# Patient Record
Sex: FEMALE | Race: WHITE | NOT HISPANIC OR LATINO | Employment: FULL TIME | URBAN - METROPOLITAN AREA
[De-identification: names, ages, dates, MRNs, and addresses within clinical notes are randomized per-mention and may not be internally consistent; named-entity substitution may affect disease eponyms.]

---

## 2017-05-03 PROBLEM — L97.909 STASIS ULCER (HCC): Status: ACTIVE | Noted: 2017-05-03

## 2017-05-03 PROBLEM — I83.009 STASIS ULCER (HCC): Status: ACTIVE | Noted: 2017-05-03

## 2017-05-03 PROBLEM — L03.90 CELLULITIS: Status: ACTIVE | Noted: 2017-05-03

## 2018-06-27 PROBLEM — I10 ESSENTIAL HYPERTENSION: Status: ACTIVE | Noted: 2018-06-27

## 2018-06-27 PROBLEM — I83.009 STASIS ULCER (HCC): Status: RESOLVED | Noted: 2017-05-03 | Resolved: 2018-06-27

## 2018-06-27 PROBLEM — L97.909 STASIS ULCER (HCC): Status: RESOLVED | Noted: 2017-05-03 | Resolved: 2018-06-27

## 2018-06-27 PROBLEM — L03.90 CELLULITIS: Status: RESOLVED | Noted: 2017-05-03 | Resolved: 2018-06-27

## 2018-08-07 ENCOUNTER — HOSPITAL ENCOUNTER (INPATIENT)
Facility: MEDICAL CENTER | Age: 58
LOS: 8 days | DRG: 853 | End: 2018-08-16
Attending: EMERGENCY MEDICINE | Admitting: INTERNAL MEDICINE

## 2018-08-07 ENCOUNTER — APPOINTMENT (OUTPATIENT)
Dept: RADIOLOGY | Facility: MEDICAL CENTER | Age: 58
DRG: 853 | End: 2018-08-07
Attending: EMERGENCY MEDICINE

## 2018-08-07 DIAGNOSIS — R41.82 ALTERED MENTAL STATUS, UNSPECIFIED ALTERED MENTAL STATUS TYPE: ICD-10-CM

## 2018-08-07 DIAGNOSIS — N39.0 ACUTE UTI: ICD-10-CM

## 2018-08-07 DIAGNOSIS — T17.908A ASPIRATION INTO AIRWAY, INITIAL ENCOUNTER: ICD-10-CM

## 2018-08-07 LAB
ACTION RANGE TRIGGERED IACRT: YES
ALBUMIN SERPL BCP-MCNC: 2.8 G/DL (ref 3.2–4.9)
ALBUMIN/GLOB SERPL: 1.2 G/DL
ALP SERPL-CCNC: 68 U/L (ref 30–99)
ALT SERPL-CCNC: 33 U/L (ref 2–50)
ANION GAP SERPL CALC-SCNC: 8 MMOL/L (ref 0–11.9)
APAP SERPL-MCNC: <10 UG/ML (ref 10–30)
APPEARANCE UR: ABNORMAL
AST SERPL-CCNC: 61 U/L (ref 12–45)
BACTERIA #/AREA URNS HPF: NEGATIVE /HPF
BASE EXCESS BLDA CALC-SCNC: -6 MMOL/L (ref -4–3)
BASOPHILS # BLD AUTO: 0.3 % (ref 0–1.8)
BASOPHILS # BLD: 0.05 K/UL (ref 0–0.12)
BILIRUB SERPL-MCNC: 0.9 MG/DL (ref 0.1–1.5)
BILIRUB UR QL STRIP.AUTO: ABNORMAL
BODY TEMPERATURE: ABNORMAL DEGREES
BUN SERPL-MCNC: 28 MG/DL (ref 8–22)
CALCIUM SERPL-MCNC: 5.5 MG/DL (ref 8.5–10.5)
CHLORIDE SERPL-SCNC: 117 MMOL/L (ref 96–112)
CK SERPL-CCNC: 2390 U/L (ref 0–154)
CO2 BLDA-SCNC: 24 MMOL/L (ref 20–33)
CO2 SERPL-SCNC: 19 MMOL/L (ref 20–33)
COLOR UR: ABNORMAL
CREAT SERPL-MCNC: 1.29 MG/DL (ref 0.5–1.4)
EKG IMPRESSION: NORMAL
EOSINOPHIL # BLD AUTO: 0 K/UL (ref 0–0.51)
EOSINOPHIL NFR BLD: 0 % (ref 0–6.9)
EPI CELLS #/AREA URNS HPF: ABNORMAL /HPF
ERYTHROCYTE [DISTWIDTH] IN BLOOD BY AUTOMATED COUNT: 52 FL (ref 35.9–50)
ETHANOL BLD-MCNC: 0 G/DL
GLOBULIN SER CALC-MCNC: 2.3 G/DL (ref 1.9–3.5)
GLUCOSE SERPL-MCNC: 121 MG/DL (ref 65–99)
GLUCOSE UR STRIP.AUTO-MCNC: NEGATIVE MG/DL
HCG SERPL QL: NEGATIVE
HCO3 BLDA-SCNC: 22.1 MMOL/L (ref 17–25)
HCT VFR BLD AUTO: 53.3 % (ref 37–47)
HGB BLD-MCNC: 17.2 G/DL (ref 12–16)
IMM GRANULOCYTES # BLD AUTO: 0.1 K/UL (ref 0–0.11)
IMM GRANULOCYTES NFR BLD AUTO: 0.6 % (ref 0–0.9)
INST. QUALIFIED PATIENT IIQPT: YES
KETONES UR STRIP.AUTO-MCNC: ABNORMAL MG/DL
LACTATE BLD-SCNC: 1.8 MMOL/L (ref 0.5–2)
LACTATE BLD-SCNC: 2 MMOL/L (ref 0.5–2)
LEUKOCYTE ESTERASE UR QL STRIP.AUTO: ABNORMAL
LYMPHOCYTES # BLD AUTO: 1.97 K/UL (ref 1–4.8)
LYMPHOCYTES NFR BLD: 12.1 % (ref 22–41)
MAGNESIUM SERPL-MCNC: 1.5 MG/DL (ref 1.5–2.5)
MCH RBC QN AUTO: 29.6 PG (ref 27–33)
MCHC RBC AUTO-ENTMCNC: 32.3 G/DL (ref 33.6–35)
MCV RBC AUTO: 91.6 FL (ref 81.4–97.8)
MICRO URNS: ABNORMAL
MONOCYTES # BLD AUTO: 2.18 K/UL (ref 0–0.85)
MONOCYTES NFR BLD AUTO: 13.4 % (ref 0–13.4)
NEUTROPHILS # BLD AUTO: 12.02 K/UL (ref 2–7.15)
NEUTROPHILS NFR BLD: 73.6 % (ref 44–72)
NITRITE UR QL STRIP.AUTO: POSITIVE
NRBC # BLD AUTO: 0 K/UL
NRBC BLD-RTO: 0 /100 WBC
O2/TOTAL GAS SETTING VFR VENT: 60 %
PCO2 BLDA: 50.4 MMHG (ref 26–37)
PCO2 TEMP ADJ BLDA: 55.7 MMHG (ref 26–37)
PH BLDA: 7.25 [PH] (ref 7.4–7.5)
PH TEMP ADJ BLDA: 7.22 [PH] (ref 7.4–7.5)
PH UR STRIP.AUTO: 5 [PH]
PHOSPHATE SERPL-MCNC: 3.4 MG/DL (ref 2.5–4.5)
PLATELET # BLD AUTO: 254 K/UL (ref 164–446)
PMV BLD AUTO: 10.2 FL (ref 9–12.9)
PO2 BLDA: 82 MMHG (ref 64–87)
PO2 TEMP ADJ BLDA: 95 MMHG (ref 64–87)
POTASSIUM SERPL-SCNC: 3.1 MMOL/L (ref 3.6–5.5)
PROT SERPL-MCNC: 5.1 G/DL (ref 6–8.2)
PROT UR QL STRIP: 300 MG/DL
RBC # BLD AUTO: 5.82 M/UL (ref 4.2–5.4)
RBC # URNS HPF: ABNORMAL /HPF
RBC UR QL AUTO: ABNORMAL
SALICYLATES SERPL-MCNC: 0 MG/DL (ref 15–25)
SAO2 % BLDA: 94 % (ref 93–99)
SODIUM SERPL-SCNC: 144 MMOL/L (ref 135–145)
SP GR UR STRIP.AUTO: 1.03
SPECIMEN DRAWN FROM PATIENT: ABNORMAL
TRIGL SERPL-MCNC: 90 MG/DL (ref 0–149)
TROPONIN I SERPL-MCNC: 0.12 NG/ML (ref 0–0.04)
UROBILINOGEN UR STRIP.AUTO-MCNC: 1 MG/DL
WBC # BLD AUTO: 16.3 K/UL (ref 4.8–10.8)
WBC #/AREA URNS HPF: ABNORMAL /HPF

## 2018-08-07 PROCEDURE — A9270 NON-COVERED ITEM OR SERVICE: HCPCS | Performed by: EMERGENCY MEDICINE

## 2018-08-07 PROCEDURE — 303105 HCHG CATHETER EXTRA

## 2018-08-07 PROCEDURE — 700111 HCHG RX REV CODE 636 W/ 250 OVERRIDE (IP): Performed by: EMERGENCY MEDICINE

## 2018-08-07 PROCEDURE — 94002 VENT MGMT INPAT INIT DAY: CPT

## 2018-08-07 PROCEDURE — 96365 THER/PROPH/DIAG IV INF INIT: CPT

## 2018-08-07 PROCEDURE — 71045 X-RAY EXAM CHEST 1 VIEW: CPT

## 2018-08-07 PROCEDURE — 96368 THER/DIAG CONCURRENT INF: CPT

## 2018-08-07 PROCEDURE — 84100 ASSAY OF PHOSPHORUS: CPT

## 2018-08-07 PROCEDURE — 80053 COMPREHEN METABOLIC PANEL: CPT

## 2018-08-07 PROCEDURE — 84478 ASSAY OF TRIGLYCERIDES: CPT

## 2018-08-07 PROCEDURE — 700111 HCHG RX REV CODE 636 W/ 250 OVERRIDE (IP)

## 2018-08-07 PROCEDURE — 82803 BLOOD GASES ANY COMBINATION: CPT

## 2018-08-07 PROCEDURE — 80307 DRUG TEST PRSMV CHEM ANLYZR: CPT

## 2018-08-07 PROCEDURE — 99291 CRITICAL CARE FIRST HOUR: CPT | Mod: 25 | Performed by: INTERNAL MEDICINE

## 2018-08-07 PROCEDURE — 99291 CRITICAL CARE FIRST HOUR: CPT

## 2018-08-07 PROCEDURE — 94760 N-INVAS EAR/PLS OXIMETRY 1: CPT

## 2018-08-07 PROCEDURE — 700105 HCHG RX REV CODE 258: Performed by: EMERGENCY MEDICINE

## 2018-08-07 PROCEDURE — 84484 ASSAY OF TROPONIN QUANT: CPT

## 2018-08-07 PROCEDURE — 93005 ELECTROCARDIOGRAM TRACING: CPT | Performed by: EMERGENCY MEDICINE

## 2018-08-07 PROCEDURE — 93005 ELECTROCARDIOGRAM TRACING: CPT

## 2018-08-07 PROCEDURE — 700101 HCHG RX REV CODE 250: Performed by: EMERGENCY MEDICINE

## 2018-08-07 PROCEDURE — 700102 HCHG RX REV CODE 250 W/ 637 OVERRIDE(OP): Performed by: EMERGENCY MEDICINE

## 2018-08-07 PROCEDURE — 83735 ASSAY OF MAGNESIUM: CPT

## 2018-08-07 PROCEDURE — 51702 INSERT TEMP BLADDER CATH: CPT

## 2018-08-07 PROCEDURE — 304538 HCHG NG TUBE

## 2018-08-07 PROCEDURE — 5A1955Z RESPIRATORY VENTILATION, GREATER THAN 96 CONSECUTIVE HOURS: ICD-10-PCS | Performed by: EMERGENCY MEDICINE

## 2018-08-07 PROCEDURE — 96367 TX/PROPH/DG ADDL SEQ IV INF: CPT

## 2018-08-07 PROCEDURE — 84703 CHORIONIC GONADOTROPIN ASSAY: CPT

## 2018-08-07 PROCEDURE — 84145 PROCALCITONIN (PCT): CPT

## 2018-08-07 PROCEDURE — 96366 THER/PROPH/DIAG IV INF ADDON: CPT

## 2018-08-07 PROCEDURE — 99292 CRITICAL CARE ADDL 30 MIN: CPT | Mod: 25 | Performed by: INTERNAL MEDICINE

## 2018-08-07 PROCEDURE — 82550 ASSAY OF CK (CPK): CPT

## 2018-08-07 PROCEDURE — 36600 WITHDRAWAL OF ARTERIAL BLOOD: CPT

## 2018-08-07 PROCEDURE — 85025 COMPLETE CBC W/AUTO DIFF WBC: CPT

## 2018-08-07 PROCEDURE — 87040 BLOOD CULTURE FOR BACTERIA: CPT

## 2018-08-07 PROCEDURE — 81001 URINALYSIS AUTO W/SCOPE: CPT

## 2018-08-07 PROCEDURE — 96375 TX/PRO/DX INJ NEW DRUG ADDON: CPT

## 2018-08-07 PROCEDURE — 83605 ASSAY OF LACTIC ACID: CPT

## 2018-08-07 RX ORDER — ONDANSETRON 2 MG/ML
4 INJECTION INTRAMUSCULAR; INTRAVENOUS ONCE
Status: COMPLETED | OUTPATIENT
Start: 2018-08-07 | End: 2018-08-07

## 2018-08-07 RX ORDER — BISACODYL 10 MG
10 SUPPOSITORY, RECTAL RECTAL
Status: DISCONTINUED | OUTPATIENT
Start: 2018-08-07 | End: 2018-08-10

## 2018-08-07 RX ORDER — AMOXICILLIN 250 MG
2 CAPSULE ORAL 2 TIMES DAILY
Status: DISCONTINUED | OUTPATIENT
Start: 2018-08-08 | End: 2018-08-10

## 2018-08-07 RX ORDER — CEFTRIAXONE SODIUM 2 G/50ML
2 INJECTION, SOLUTION INTRAVENOUS ONCE
Status: COMPLETED | OUTPATIENT
Start: 2018-08-07 | End: 2018-08-07

## 2018-08-07 RX ORDER — ONDANSETRON 2 MG/ML
INJECTION INTRAMUSCULAR; INTRAVENOUS
Status: COMPLETED
Start: 2018-08-07 | End: 2018-08-07

## 2018-08-07 RX ORDER — IPRATROPIUM BROMIDE AND ALBUTEROL SULFATE 2.5; .5 MG/3ML; MG/3ML
3 SOLUTION RESPIRATORY (INHALATION)
Status: DISCONTINUED | OUTPATIENT
Start: 2018-08-08 | End: 2018-08-13 | Stop reason: ALTCHOICE

## 2018-08-07 RX ORDER — DIPHENHYDRAMINE HYDROCHLORIDE 50 MG/ML
50 INJECTION INTRAMUSCULAR; INTRAVENOUS ONCE
Status: COMPLETED | OUTPATIENT
Start: 2018-08-07 | End: 2018-08-07

## 2018-08-07 RX ORDER — FAMOTIDINE 20 MG/1
20 TABLET, FILM COATED ORAL EVERY 12 HOURS
Status: DISCONTINUED | OUTPATIENT
Start: 2018-08-08 | End: 2018-08-09

## 2018-08-07 RX ORDER — ACETAMINOPHEN 650 MG/1
650 SUPPOSITORY RECTAL ONCE
Status: COMPLETED | OUTPATIENT
Start: 2018-08-07 | End: 2018-08-07

## 2018-08-07 RX ORDER — CALCIUM GLUCONATE 94 MG/ML
1 INJECTION, SOLUTION INTRAVENOUS ONCE
Status: DISCONTINUED | OUTPATIENT
Start: 2018-08-07 | End: 2018-08-07

## 2018-08-07 RX ORDER — SODIUM CHLORIDE 9 MG/ML
INJECTION, SOLUTION INTRAVENOUS CONTINUOUS
Status: DISCONTINUED | OUTPATIENT
Start: 2018-08-08 | End: 2018-08-08

## 2018-08-07 RX ORDER — POLYETHYLENE GLYCOL 3350 17 G/17G
1 POWDER, FOR SOLUTION ORAL
Status: DISCONTINUED | OUTPATIENT
Start: 2018-08-07 | End: 2018-08-10

## 2018-08-07 RX ORDER — SODIUM CHLORIDE 9 MG/ML
1500 INJECTION, SOLUTION INTRAVENOUS
Status: COMPLETED | OUTPATIENT
Start: 2018-08-07 | End: 2018-08-07

## 2018-08-07 RX ORDER — DEXTROSE MONOHYDRATE 25 G/50ML
25 INJECTION, SOLUTION INTRAVENOUS
Status: DISCONTINUED | OUTPATIENT
Start: 2018-08-07 | End: 2018-08-13

## 2018-08-07 RX ORDER — IPRATROPIUM BROMIDE AND ALBUTEROL SULFATE 2.5; .5 MG/3ML; MG/3ML
3 SOLUTION RESPIRATORY (INHALATION)
Status: DISCONTINUED | OUTPATIENT
Start: 2018-08-07 | End: 2018-08-16 | Stop reason: HOSPADM

## 2018-08-07 RX ADMIN — ACETAMINOPHEN 650 MG: 650 SUPPOSITORY RECTAL at 21:04

## 2018-08-07 RX ADMIN — METRONIDAZOLE 500 MG: 500 INJECTION, SOLUTION INTRAVENOUS at 22:06

## 2018-08-07 RX ADMIN — CALCIUM GLUCONATE 1 G: 94 INJECTION, SOLUTION INTRAVENOUS at 23:11

## 2018-08-07 RX ADMIN — SODIUM CHLORIDE 1500 ML: 9 INJECTION, SOLUTION INTRAVENOUS at 21:04

## 2018-08-07 RX ADMIN — DIPHENHYDRAMINE HYDROCHLORIDE 50 MG: 50 INJECTION, SOLUTION INTRAMUSCULAR; INTRAVENOUS at 21:21

## 2018-08-07 RX ADMIN — CEFTRIAXONE SODIUM 2 G: 2 INJECTION, SOLUTION INTRAVENOUS at 21:49

## 2018-08-07 RX ADMIN — FENTANYL CITRATE 100 MCG: 50 INJECTION, SOLUTION INTRAMUSCULAR; INTRAVENOUS at 21:45

## 2018-08-07 RX ADMIN — ONDANSETRON 4 MG: 2 INJECTION, SOLUTION INTRAMUSCULAR; INTRAVENOUS at 21:45

## 2018-08-07 RX ADMIN — PROPOFOL 5 MCG/KG/MIN: 10 INJECTION, EMULSION INTRAVENOUS at 21:01

## 2018-08-07 RX ADMIN — ONDANSETRON 4 MG: 2 INJECTION INTRAMUSCULAR; INTRAVENOUS at 21:45

## 2018-08-08 ENCOUNTER — APPOINTMENT (OUTPATIENT)
Dept: RADIOLOGY | Facility: MEDICAL CENTER | Age: 58
DRG: 853 | End: 2018-08-08
Attending: INTERNAL MEDICINE

## 2018-08-08 PROBLEM — R73.9 HYPERGLYCEMIA: Status: ACTIVE | Noted: 2018-08-08

## 2018-08-08 PROBLEM — J18.9 PNEUMONIA DUE TO INFECTIOUS ORGANISM: Status: ACTIVE | Noted: 2018-08-08

## 2018-08-08 PROBLEM — I48.91 ATRIAL FIBRILLATION WITH RVR (HCC): Status: ACTIVE | Noted: 2018-08-08

## 2018-08-08 PROBLEM — J69.0 ASPIRATION PNEUMONIA (HCC): Status: ACTIVE | Noted: 2018-08-08

## 2018-08-08 PROBLEM — R82.90 ABNORMAL URINALYSIS: Status: ACTIVE | Noted: 2018-08-08

## 2018-08-08 PROBLEM — J96.01 ACUTE HYPOXEMIC RESPIRATORY FAILURE (HCC): Status: ACTIVE | Noted: 2018-08-08

## 2018-08-08 PROBLEM — G93.40 ENCEPHALOPATHY ACUTE: Status: ACTIVE | Noted: 2018-08-08

## 2018-08-08 PROBLEM — E87.6 HYPOKALEMIA: Status: ACTIVE | Noted: 2018-08-08

## 2018-08-08 PROBLEM — R65.20 SEPSIS WITH MULTIPLE ORGAN DYSFUNCTION (MOD) (HCC): Status: ACTIVE | Noted: 2018-08-08

## 2018-08-08 PROBLEM — G03.9 MENINGITIS: Status: ACTIVE | Noted: 2018-08-08

## 2018-08-08 PROBLEM — I27.20 PULMONARY HYPERTENSION (HCC): Status: ACTIVE | Noted: 2018-08-08

## 2018-08-08 PROBLEM — M62.82 RHABDOMYOLYSIS: Status: ACTIVE | Noted: 2018-08-08

## 2018-08-08 PROBLEM — R79.89 ELEVATED TROPONIN: Status: ACTIVE | Noted: 2018-08-08

## 2018-08-08 PROBLEM — E83.42 HYPOMAGNESEMIA: Status: ACTIVE | Noted: 2018-08-08

## 2018-08-08 PROBLEM — J98.11 ATELECTASIS: Status: ACTIVE | Noted: 2018-08-08

## 2018-08-08 PROBLEM — E83.51 HYPOCALCEMIA: Status: ACTIVE | Noted: 2018-08-08

## 2018-08-08 PROBLEM — A41.9 SEPSIS WITH MULTIPLE ORGAN DYSFUNCTION (MOD) (HCC): Status: ACTIVE | Noted: 2018-08-08

## 2018-08-08 LAB
ACTION RANGE TRIGGERED IACRT: NO
ANION GAP SERPL CALC-SCNC: 6 MMOL/L (ref 0–11.9)
BASE EXCESS BLDA CALC-SCNC: -3 MMOL/L (ref -4–3)
BASOPHILS # BLD AUTO: 0.5 % (ref 0–1.8)
BASOPHILS # BLD: 0.07 K/UL (ref 0–0.12)
BODY TEMPERATURE: ABNORMAL DEGREES
BUN SERPL-MCNC: 34 MG/DL (ref 8–22)
BURR CELLS/RBC NFR CSF MANUAL: 0 %
CA-I SERPL-SCNC: 1.1 MMOL/L (ref 1.1–1.3)
CALCIUM SERPL-MCNC: 8.2 MG/DL (ref 8.5–10.5)
CHLORIDE SERPL-SCNC: 109 MMOL/L (ref 96–112)
CLARITY CSF: CLEAR
CO2 BLDA-SCNC: 22 MMOL/L (ref 20–33)
CO2 SERPL-SCNC: 24 MMOL/L (ref 20–33)
COLOR CSF: COLORLESS
CREAT SERPL-MCNC: 1.47 MG/DL (ref 0.5–1.4)
EKG IMPRESSION: NORMAL
EOSINOPHIL # BLD AUTO: 0 K/UL (ref 0–0.51)
EOSINOPHIL NFR BLD: 0 % (ref 0–6.9)
ERYTHROCYTE [DISTWIDTH] IN BLOOD BY AUTOMATED COUNT: 52.9 FL (ref 35.9–50)
GLUCOSE BLD-MCNC: 134 MG/DL (ref 65–99)
GLUCOSE BLD-MCNC: 89 MG/DL (ref 65–99)
GLUCOSE CSF-MCNC: 79 MG/DL (ref 40–80)
GLUCOSE SERPL-MCNC: 116 MG/DL (ref 65–99)
GRAM STN SPEC: ABNORMAL
GRAM STN SPEC: NORMAL
HCO3 BLDA-SCNC: 21.1 MMOL/L (ref 17–25)
HCT VFR BLD AUTO: 43.6 % (ref 37–47)
HGB BLD-MCNC: 13.8 G/DL (ref 12–16)
IMM GRANULOCYTES # BLD AUTO: 0.06 K/UL (ref 0–0.11)
IMM GRANULOCYTES NFR BLD AUTO: 0.4 % (ref 0–0.9)
INST. QUALIFIED PATIENT IIQPT: YES
LV EJECT FRACT  99904: 60
LV EJECT FRACT MOD 2C 99903: 57.32
LV EJECT FRACT MOD 4C 99902: 63.29
LV EJECT FRACT MOD BP 99901: 61.59
LYMPHOCYTES # BLD AUTO: 2.88 K/UL (ref 1–4.8)
LYMPHOCYTES NFR BLD: 19 % (ref 22–41)
LYMPHOCYTES NFR CSF: 33 %
MAGNESIUM SERPL-MCNC: 2.5 MG/DL (ref 1.5–2.5)
MCH RBC QN AUTO: 29.1 PG (ref 27–33)
MCHC RBC AUTO-ENTMCNC: 31.7 G/DL (ref 33.6–35)
MCV RBC AUTO: 91.8 FL (ref 81.4–97.8)
MONOCYTES # BLD AUTO: 2.46 K/UL (ref 0–0.85)
MONOCYTES NFR BLD AUTO: 16.2 % (ref 0–13.4)
MONONUC CELLS NFR CSF: 4 %
NEUTROPHILS # BLD AUTO: 9.71 K/UL (ref 2–7.15)
NEUTROPHILS NFR BLD: 63.9 % (ref 44–72)
NEUTROPHILS NFR CSF: 60 %
NRBC # BLD AUTO: 0 K/UL
NRBC # CSF: 1 10*3/UL
NRBC BLD-RTO: 0 /100 WBC
O2/TOTAL GAS SETTING VFR VENT: 80 %
OTHER CELLS CSF: 3 %
PCO2 BLDA: 36.1 MMHG (ref 26–37)
PCO2 TEMP ADJ BLDA: 39.6 MMHG (ref 26–37)
PH BLDA: 7.38 [PH] (ref 7.4–7.5)
PH TEMP ADJ BLDA: 7.34 [PH] (ref 7.4–7.5)
PHOSPHATE SERPL-MCNC: 3.7 MG/DL (ref 2.5–4.5)
PLATELET # BLD AUTO: 183 K/UL (ref 164–446)
PMV BLD AUTO: 10.4 FL (ref 9–12.9)
PO2 BLDA: 60 MMHG (ref 64–87)
PO2 TEMP ADJ BLDA: 70 MMHG (ref 64–87)
POTASSIUM SERPL-SCNC: 3.8 MMOL/L (ref 3.6–5.5)
PROCALCITONIN SERPL-MCNC: 0.36 NG/ML
PROT CSF-MCNC: 88 MG/DL (ref 15–45)
RBC # BLD AUTO: 4.75 M/UL (ref 4.2–5.4)
RBC # CSF: 187 CELLS/UL
RHODAMINE-AURAMINE STN SPEC: NORMAL
SAO2 % BLDA: 90 % (ref 93–99)
SIGNIFICANT IND 70042: ABNORMAL
SIGNIFICANT IND 70042: NORMAL
SIGNIFICANT IND 70042: NORMAL
SITE SITE: ABNORMAL
SITE SITE: NORMAL
SITE SITE: NORMAL
SODIUM SERPL-SCNC: 139 MMOL/L (ref 135–145)
SOURCE SOURCE: ABNORMAL
SOURCE SOURCE: NORMAL
SOURCE SOURCE: NORMAL
SPECIMEN DRAWN FROM PATIENT: ABNORMAL
SPECIMEN VOL CSF: 3 ML
TSH SERPL DL<=0.005 MIU/L-ACNC: 0.81 UIU/ML (ref 0.38–5.33)
TUBE # CSF: 3
TUBE # CSF: 4
WBC # BLD AUTO: 15.2 K/UL (ref 4.8–10.8)
WBC # CSF: 36 CELLS/UL (ref 0–10)

## 2018-08-08 PROCEDURE — 302977 HCHG BRONCHOSCOPY PROC-THERAPEUTIC

## 2018-08-08 PROCEDURE — 700101 HCHG RX REV CODE 250: Performed by: INTERNAL MEDICINE

## 2018-08-08 PROCEDURE — 87015 SPECIMEN INFECT AGNT CONCNTJ: CPT

## 2018-08-08 PROCEDURE — 36556 INSERT NON-TUNNEL CV CATH: CPT | Mod: RT | Performed by: INTERNAL MEDICINE

## 2018-08-08 PROCEDURE — 93005 ELECTROCARDIOGRAM TRACING: CPT | Performed by: INTERNAL MEDICINE

## 2018-08-08 PROCEDURE — 72125 CT NECK SPINE W/O DYE: CPT

## 2018-08-08 PROCEDURE — 700111 HCHG RX REV CODE 636 W/ 250 OVERRIDE (IP)

## 2018-08-08 PROCEDURE — 80048 BASIC METABOLIC PNL TOTAL CA: CPT

## 2018-08-08 PROCEDURE — 700105 HCHG RX REV CODE 258

## 2018-08-08 PROCEDURE — 36556 INSERT NON-TUNNEL CV CATH: CPT

## 2018-08-08 PROCEDURE — 93010 ELECTROCARDIOGRAM REPORT: CPT | Performed by: INTERNAL MEDICINE

## 2018-08-08 PROCEDURE — 700105 HCHG RX REV CODE 258: Performed by: INTERNAL MEDICINE

## 2018-08-08 PROCEDURE — 87077 CULTURE AEROBIC IDENTIFY: CPT

## 2018-08-08 PROCEDURE — 96366 THER/PROPH/DIAG IV INF ADDON: CPT

## 2018-08-08 PROCEDURE — 87116 MYCOBACTERIA CULTURE: CPT

## 2018-08-08 PROCEDURE — 700111 HCHG RX REV CODE 636 W/ 250 OVERRIDE (IP): Performed by: STUDENT IN AN ORGANIZED HEALTH CARE EDUCATION/TRAINING PROGRAM

## 2018-08-08 PROCEDURE — 700117 HCHG RX CONTRAST REV CODE 255: Performed by: EMERGENCY MEDICINE

## 2018-08-08 PROCEDURE — 84100 ASSAY OF PHOSPHORUS: CPT

## 2018-08-08 PROCEDURE — 88305 TISSUE EXAM BY PATHOLOGIST: CPT

## 2018-08-08 PROCEDURE — 02HV33Z INSERTION OF INFUSION DEVICE INTO SUPERIOR VENA CAVA, PERCUTANEOUS APPROACH: ICD-10-PCS | Performed by: INTERNAL MEDICINE

## 2018-08-08 PROCEDURE — C1751 CATH, INF, PER/CENT/MIDLINE: HCPCS

## 2018-08-08 PROCEDURE — 94640 AIRWAY INHALATION TREATMENT: CPT

## 2018-08-08 PROCEDURE — 009U3ZX DRAINAGE OF SPINAL CANAL, PERCUTANEOUS APPROACH, DIAGNOSTIC: ICD-10-PCS | Performed by: EMERGENCY MEDICINE

## 2018-08-08 PROCEDURE — 84157 ASSAY OF PROTEIN OTHER: CPT

## 2018-08-08 PROCEDURE — 93306 TTE W/DOPPLER COMPLETE: CPT

## 2018-08-08 PROCEDURE — 87102 FUNGUS ISOLATION CULTURE: CPT

## 2018-08-08 PROCEDURE — 88112 CYTOPATH CELL ENHANCE TECH: CPT

## 2018-08-08 PROCEDURE — 87206 SMEAR FLUORESCENT/ACID STAI: CPT

## 2018-08-08 PROCEDURE — 87070 CULTURE OTHR SPECIMN AEROBIC: CPT | Mod: 91

## 2018-08-08 PROCEDURE — 82962 GLUCOSE BLOOD TEST: CPT

## 2018-08-08 PROCEDURE — 93306 TTE W/DOPPLER COMPLETE: CPT | Mod: 26 | Performed by: INTERNAL MEDICINE

## 2018-08-08 PROCEDURE — 85025 COMPLETE CBC W/AUTO DIFF WBC: CPT

## 2018-08-08 PROCEDURE — 700111 HCHG RX REV CODE 636 W/ 250 OVERRIDE (IP): Performed by: INTERNAL MEDICINE

## 2018-08-08 PROCEDURE — 87205 SMEAR GRAM STAIN: CPT | Mod: 91

## 2018-08-08 PROCEDURE — 70450 CT HEAD/BRAIN W/O DYE: CPT

## 2018-08-08 PROCEDURE — 99291 CRITICAL CARE FIRST HOUR: CPT | Performed by: INTERNAL MEDICINE

## 2018-08-08 PROCEDURE — 71045 X-RAY EXAM CHEST 1 VIEW: CPT

## 2018-08-08 PROCEDURE — 71275 CT ANGIOGRAPHY CHEST: CPT

## 2018-08-08 PROCEDURE — 87529 HSV DNA AMP PROBE: CPT

## 2018-08-08 PROCEDURE — 86790 VIRUS ANTIBODY NOS: CPT

## 2018-08-08 PROCEDURE — 31645 BRNCHSC W/THER ASPIR 1ST: CPT | Mod: 59 | Performed by: INTERNAL MEDICINE

## 2018-08-08 PROCEDURE — 31624 DX BRONCHOSCOPE/LAVAGE: CPT | Performed by: INTERNAL MEDICINE

## 2018-08-08 PROCEDURE — 82945 GLUCOSE OTHER FLUID: CPT

## 2018-08-08 PROCEDURE — 306802 CATHETER,INSTAFLOW BOWEL: Performed by: INTERNAL MEDICINE

## 2018-08-08 PROCEDURE — 0B9J8ZX DRAINAGE OF LEFT LOWER LUNG LOBE, VIA NATURAL OR ARTIFICIAL OPENING ENDOSCOPIC, DIAGNOSTIC: ICD-10-PCS | Performed by: INTERNAL MEDICINE

## 2018-08-08 PROCEDURE — 82803 BLOOD GASES ANY COMBINATION: CPT

## 2018-08-08 PROCEDURE — 62270 DX LMBR SPI PNXR: CPT

## 2018-08-08 PROCEDURE — 94003 VENT MGMT INPAT SUBQ DAY: CPT

## 2018-08-08 PROCEDURE — 84443 ASSAY THYROID STIM HORMONE: CPT

## 2018-08-08 PROCEDURE — B548ZZA ULTRASONOGRAPHY OF SUPERIOR VENA CAVA, GUIDANCE: ICD-10-PCS | Performed by: INTERNAL MEDICINE

## 2018-08-08 PROCEDURE — 770022 HCHG ROOM/CARE - ICU (200)

## 2018-08-08 PROCEDURE — 87184 SC STD DISK METHOD PER PLATE: CPT

## 2018-08-08 PROCEDURE — 87186 SC STD MICRODIL/AGAR DIL: CPT

## 2018-08-08 PROCEDURE — 82330 ASSAY OF CALCIUM: CPT

## 2018-08-08 PROCEDURE — 89051 BODY FLUID CELL COUNT: CPT

## 2018-08-08 PROCEDURE — 83735 ASSAY OF MAGNESIUM: CPT

## 2018-08-08 RX ORDER — MAGNESIUM SULFATE HEPTAHYDRATE 40 MG/ML
4 INJECTION, SOLUTION INTRAVENOUS ONCE
Status: COMPLETED | OUTPATIENT
Start: 2018-08-08 | End: 2018-08-08

## 2018-08-08 RX ORDER — SODIUM CHLORIDE 9 MG/ML
30 INJECTION, SOLUTION INTRAVENOUS
Status: DISCONTINUED | OUTPATIENT
Start: 2018-08-08 | End: 2018-08-08

## 2018-08-08 RX ORDER — SODIUM CHLORIDE, SODIUM LACTATE, POTASSIUM CHLORIDE, CALCIUM CHLORIDE 600; 310; 30; 20 MG/100ML; MG/100ML; MG/100ML; MG/100ML
30 INJECTION, SOLUTION INTRAVENOUS ONCE
Status: COMPLETED | OUTPATIENT
Start: 2018-08-08 | End: 2018-08-08

## 2018-08-08 RX ORDER — POTASSIUM CHLORIDE 29.8 MG/ML
40 INJECTION INTRAVENOUS ONCE
Status: DISCONTINUED | OUTPATIENT
Start: 2018-08-08 | End: 2018-08-08

## 2018-08-08 RX ORDER — POTASSIUM CHLORIDE 29.8 MG/ML
40 INJECTION INTRAVENOUS EVERY 4 HOURS
Status: DISCONTINUED | OUTPATIENT
Start: 2018-08-08 | End: 2018-08-08

## 2018-08-08 RX ORDER — SODIUM CHLORIDE 9 MG/ML
500 INJECTION, SOLUTION INTRAVENOUS
Status: DISCONTINUED | OUTPATIENT
Start: 2018-08-08 | End: 2018-08-08

## 2018-08-08 RX ORDER — DEXAMETHASONE SODIUM PHOSPHATE 4 MG/ML
4 INJECTION, SOLUTION INTRA-ARTICULAR; INTRALESIONAL; INTRAMUSCULAR; INTRAVENOUS; SOFT TISSUE ONCE
Status: DISCONTINUED | OUTPATIENT
Start: 2018-08-08 | End: 2018-08-08

## 2018-08-08 RX ORDER — LIDOCAINE HYDROCHLORIDE 10 MG/ML
INJECTION, SOLUTION EPIDURAL; INFILTRATION; INTRACAUDAL; PERINEURAL
Status: COMPLETED
Start: 2018-08-08 | End: 2018-08-08

## 2018-08-08 RX ORDER — POTASSIUM CHLORIDE 14.9 MG/ML
20 INJECTION INTRAVENOUS ONCE
Status: COMPLETED | OUTPATIENT
Start: 2018-08-08 | End: 2018-08-08

## 2018-08-08 RX ORDER — SODIUM CHLORIDE 9 MG/ML
INJECTION, SOLUTION INTRAVENOUS
Status: COMPLETED
Start: 2018-08-08 | End: 2018-08-08

## 2018-08-08 RX ORDER — AMPICILLIN 2 G/1
2 INJECTION, POWDER, FOR SOLUTION INTRAVENOUS EVERY 4 HOURS
Status: DISCONTINUED | OUTPATIENT
Start: 2018-08-08 | End: 2018-08-08

## 2018-08-08 RX ORDER — CALCIUM CHLORIDE 100 MG/ML
1 INJECTION INTRAVENOUS; INTRAVENTRICULAR ONCE
Status: COMPLETED | OUTPATIENT
Start: 2018-08-08 | End: 2018-08-08

## 2018-08-08 RX ORDER — SODIUM CHLORIDE, SODIUM LACTATE, POTASSIUM CHLORIDE, CALCIUM CHLORIDE 600; 310; 30; 20 MG/100ML; MG/100ML; MG/100ML; MG/100ML
INJECTION, SOLUTION INTRAVENOUS CONTINUOUS
Status: DISCONTINUED | OUTPATIENT
Start: 2018-08-08 | End: 2018-08-15

## 2018-08-08 RX ORDER — DEXAMETHASONE SODIUM PHOSPHATE 4 MG/ML
10 INJECTION, SOLUTION INTRA-ARTICULAR; INTRALESIONAL; INTRAMUSCULAR; INTRAVENOUS; SOFT TISSUE ONCE
Status: COMPLETED | OUTPATIENT
Start: 2018-08-08 | End: 2018-08-08

## 2018-08-08 RX ORDER — SODIUM CHLORIDE, SODIUM LACTATE, POTASSIUM CHLORIDE, CALCIUM CHLORIDE 600; 310; 30; 20 MG/100ML; MG/100ML; MG/100ML; MG/100ML
INJECTION, SOLUTION INTRAVENOUS
Status: COMPLETED
Start: 2018-08-08 | End: 2018-08-08

## 2018-08-08 RX ORDER — LIDOCAINE HYDROCHLORIDE 20 MG/ML
INJECTION, SOLUTION INFILTRATION; PERINEURAL
Status: COMPLETED
Start: 2018-08-08 | End: 2018-08-08

## 2018-08-08 RX ADMIN — IOHEXOL 65 ML: 350 INJECTION, SOLUTION INTRAVENOUS at 00:43

## 2018-08-08 RX ADMIN — ENOXAPARIN SODIUM 40 MG: 100 INJECTION SUBCUTANEOUS at 05:32

## 2018-08-08 RX ADMIN — ACYCLOVIR SODIUM 700 MG: 500 INJECTION, SOLUTION INTRAVENOUS at 17:45

## 2018-08-08 RX ADMIN — AMIODARONE HYDROCHLORIDE 1 MG/MIN: 50 INJECTION, SOLUTION INTRAVENOUS at 15:46

## 2018-08-08 RX ADMIN — PROPOFOL 30 MCG/KG/MIN: 10 INJECTION, EMULSION INTRAVENOUS at 05:32

## 2018-08-08 RX ADMIN — IPRATROPIUM BROMIDE AND ALBUTEROL SULFATE 3 ML: .5; 3 SOLUTION RESPIRATORY (INHALATION) at 06:27

## 2018-08-08 RX ADMIN — DEXAMETHASONE SODIUM PHOSPHATE 10 MG: 4 INJECTION, SOLUTION INTRAMUSCULAR; INTRAVENOUS at 12:59

## 2018-08-08 RX ADMIN — PROPOFOL 20 MCG/KG/MIN: 10 INJECTION, EMULSION INTRAVENOUS at 20:34

## 2018-08-08 RX ADMIN — CEFTRIAXONE 2 G: 2 INJECTION, POWDER, FOR SOLUTION INTRAMUSCULAR; INTRAVENOUS at 05:37

## 2018-08-08 RX ADMIN — SODIUM CHLORIDE: 9 INJECTION, SOLUTION INTRAVENOUS at 01:41

## 2018-08-08 RX ADMIN — POTASSIUM CHLORIDE 40 MEQ: 400 INJECTION, SOLUTION INTRAVENOUS at 05:53

## 2018-08-08 RX ADMIN — PROPOFOL 20 MCG/KG/MIN: 10 INJECTION, EMULSION INTRAVENOUS at 02:16

## 2018-08-08 RX ADMIN — ACYCLOVIR SODIUM 700 MG: 500 INJECTION, SOLUTION INTRAVENOUS at 21:40

## 2018-08-08 RX ADMIN — IPRATROPIUM BROMIDE AND ALBUTEROL SULFATE 3 ML: .5; 3 SOLUTION RESPIRATORY (INHALATION) at 20:01

## 2018-08-08 RX ADMIN — AMIODARONE HYDROCHLORIDE 150 MG: 50 INJECTION, SOLUTION INTRAVENOUS at 05:46

## 2018-08-08 RX ADMIN — PROPOFOL 10 MCG/KG/MIN: 10 INJECTION, EMULSION INTRAVENOUS at 10:47

## 2018-08-08 RX ADMIN — VANCOMYCIN HYDROCHLORIDE 3000 MG: 100 INJECTION, POWDER, LYOPHILIZED, FOR SOLUTION INTRAVENOUS at 05:33

## 2018-08-08 RX ADMIN — SODIUM CHLORIDE, POTASSIUM CHLORIDE, SODIUM LACTATE AND CALCIUM CHLORIDE: 600; 310; 30; 20 INJECTION, SOLUTION INTRAVENOUS at 10:45

## 2018-08-08 RX ADMIN — IPRATROPIUM BROMIDE AND ALBUTEROL SULFATE 3 ML: .5; 3 SOLUTION RESPIRATORY (INHALATION) at 04:35

## 2018-08-08 RX ADMIN — FENTANYL CITRATE 100 MCG: 50 INJECTION, SOLUTION INTRAMUSCULAR; INTRAVENOUS at 04:26

## 2018-08-08 RX ADMIN — LIDOCAINE HYDROCHLORIDE: 10 INJECTION, SOLUTION EPIDURAL; INFILTRATION; INTRACAUDAL; PERINEURAL at 00:45

## 2018-08-08 RX ADMIN — AMIODARONE HYDROCHLORIDE 1 MG/MIN: 50 INJECTION, SOLUTION INTRAVENOUS at 05:57

## 2018-08-08 RX ADMIN — FAMOTIDINE 20 MG: 10 INJECTION, SOLUTION INTRAVENOUS at 05:32

## 2018-08-08 RX ADMIN — CALCIUM CHLORIDE 1 G: 100 INJECTION INTRAVENOUS; INTRAVENTRICULAR at 05:32

## 2018-08-08 RX ADMIN — FAMOTIDINE 20 MG: 10 INJECTION, SOLUTION INTRAVENOUS at 17:44

## 2018-08-08 RX ADMIN — MAGNESIUM SULFATE IN WATER 4 G: 40 INJECTION, SOLUTION INTRAVENOUS at 05:00

## 2018-08-08 RX ADMIN — SODIUM CHLORIDE 500 ML: 9 INJECTION, SOLUTION INTRAVENOUS at 15:47

## 2018-08-08 RX ADMIN — ACYCLOVIR SODIUM 700 MG: 500 INJECTION, SOLUTION INTRAVENOUS at 09:09

## 2018-08-08 RX ADMIN — IPRATROPIUM BROMIDE AND ALBUTEROL SULFATE 3 ML: .5; 3 SOLUTION RESPIRATORY (INHALATION) at 09:52

## 2018-08-08 RX ADMIN — SODIUM CHLORIDE, POTASSIUM CHLORIDE, SODIUM LACTATE AND CALCIUM CHLORIDE 2500 ML: 600; 310; 30; 20 INJECTION, SOLUTION INTRAVENOUS at 05:43

## 2018-08-08 RX ADMIN — IPRATROPIUM BROMIDE AND ALBUTEROL SULFATE 3 ML: .5; 3 SOLUTION RESPIRATORY (INHALATION) at 14:02

## 2018-08-08 RX ADMIN — AMPICILLIN SODIUM 2000 MG: 2 INJECTION, POWDER, FOR SOLUTION INTRAMUSCULAR; INTRAVENOUS at 05:58

## 2018-08-08 RX ADMIN — POTASSIUM CHLORIDE 20 MEQ: 14.9 INJECTION, SOLUTION INTRAVENOUS at 11:42

## 2018-08-08 RX ADMIN — SODIUM CHLORIDE, POTASSIUM CHLORIDE, SODIUM LACTATE AND CALCIUM CHLORIDE: 600; 310; 30; 20 INJECTION, SOLUTION INTRAVENOUS at 20:13

## 2018-08-08 RX ADMIN — SODIUM CHLORIDE, POTASSIUM CHLORIDE, SODIUM LACTATE AND CALCIUM CHLORIDE: 600; 310; 30; 20 INJECTION, SOLUTION INTRAVENOUS at 10:49

## 2018-08-08 NOTE — PROGRESS NOTES
2RN Skin assessment completed with SHAVON Friedman. Open abrasion noted on pt upper back/ shoulder, mepilex placed over wound. Rash noted on pt right mid side, left leg and heel and right leg/heel is dry calloused with small skin tears present. Small skin tear noted on left elbow. Sacrum and coccyx intact with mepilex placed. Skin beneath breasts, stomach and folds near groin pink and blanching and cleansed and interdry placed. Heel float boats and pillows in use for support and positioning. Waffle cushion placed under patient. Pictures taken of all wounds and uploaded in EPIC with wound consult and LDA's placed.

## 2018-08-08 NOTE — DISCHARGE PLANNING
Medical Social Work    MSW received transferred call from pt's son, Andrea ([phone]) who states that they are having an issue trying to get to [facility] from [place].  MSW provided pt's son with a brief update.  Pt's son was very tearful and was provided with emotional support.  Pt's son states that he has a list of pt's medications and will bring it to the hospital.  Pt's son was provided with directions to [facility].  Bedside RN updated.    Plan: SW will remain available.

## 2018-08-08 NOTE — CONSULTS
PULMONARY AND CRITICAL CARE MEDICINE CONSULTATION    Date of Consultation:  8/7/2018    Requesting Physician:  Ej Joyce MD    Consulting Physician:  Henrry Ying MD    Reason for Consultation:  Critical care management of lady with respiratory failure, encephalopathy and sepsis    Chief Complaint:  Respiratory failure    History of Present Illness:    I was kindly asked to see and evaluate Salome Quinones, a 58 y.o. female for evaluation and management of the above problem.    The entire history is obtained from healthcare providers and the medical record as this lady cannot give me any history.  This lady has a history of hypertension and sleep apnea.  Apparently she was sick on Sunday.  Monday she was hot and diaphoretic and did not feel good.  She declined to go to the hospital at that time.  Monday night she slept on the couch and apparently fell onto the floor during the night.  This morning she was on the floor, but her family felt that she was sleeping.  This evening, EMS was activated when the family realized that she had not moved from the floor all day.  There was evidence of emesis at the scene.  EMS found the patient have a temperature of 104°F and she was breathing 40 times per minute.  She was intubated and transported to [facility].    Medications Prior to Admission:    No current facility-administered medications on file prior to encounter.      Current Outpatient Prescriptions on File Prior to Encounter   Medication Sig Dispense Refill   • hydrALAZINE (APRESOLINE) 25 MG Tab Take 1 tablet 3 times daily 90 Tab 11   • carvedilol (COREG) 6.25 MG Tab Take 1 Tab by mouth 2 times a day, with meals. 60 Tab 11   • losartan (COZAAR) 50 MG Tab Take 1 Tab by mouth 2 Times a Day. 60 Tab 11   • gabapentin (NEURONTIN) 300 MG Cap Take 1 capsule twice daily 60 Cap 3   • Naproxen Sodium (ALEVE PO) Take  by mouth.         Current Medications:      Current Facility-Administered Medications:   •  propofol  (DIPRIVAN) injection, 0-80 mcg/kg/min, Intravenous, Continuous, Last Rate: 16.1 mL/hr at 08/08/18 0216, 20 mcg/kg/min at 08/08/18 0216 **AND** [START ON 8/9/2018] Triglycerides Starting now and then Every 3 Days, , , Every 3 Days (0300), Henrry Ying M.D.  •  enoxaparin (LOVENOX) inj 40 mg, 40 mg, Subcutaneous, DAILY, Liborio Johnson M.D.  •  Respiratory Care per Protocol, , Nebulization, Continuous RT, Henrry Ying M.D.  •  senna-docusate (PERICOLACE or SENOKOT S) 8.6-50 MG per tablet 2 Tab, 2 Tab, Oral, BID **AND** polyethylene glycol/lytes (MIRALAX) PACKET 1 Packet, 1 Packet, Oral, QDAY PRN **AND** magnesium hydroxide (MILK OF MAGNESIA) suspension 30 mL, 30 mL, Oral, QDAY PRN **AND** bisacodyl (DULCOLAX) suppository 10 mg, 10 mg, Rectal, QDAY PRN, Henrry Ying M.D.  •  MD ALERT...Adult ICU Electrolyte Replacement per Pharmacy Protocol, , Other, pharmacy to dose, Henrry Ying M.D.  •  NS infusion, , Intravenous, Continuous, Henrry Ying M.D., Last Rate: 125 mL/hr at 08/08/18 0141  •  fentaNYL (SUBLIMAZE) injection 25 mcg, 25 mcg, Intravenous, Q HOUR PRN **OR** fentaNYL (SUBLIMAZE) injection 50 mcg, 50 mcg, Intravenous, Q HOUR PRN **OR** fentaNYL (SUBLIMAZE) injection 100 mcg, 100 mcg, Intravenous, Q HOUR PRN, Henrry Ying M.D.  •  ipratropium-albuterol (DUONEB) nebulizer solution, 3 mL, Nebulization, Q2HRS PRN (RT), Henrry Ying M.D.  •  ipratropium-albuterol (DUONEB) nebulizer solution, 3 mL, Nebulization, Q4HRS (RT), Henrry Ying M.D.  •  famotidine (PEPCID) tablet 20 mg, 20 mg, Oral, Q12HRS **OR** famotidine (PEPCID) injection 20 mg, 20 mg, Intravenous, Q12HRS, Henrry Ying M.D.  •  insulin regular (HUMULIN R) injection 2-9 Units, 2-9 Units, Subcutaneous, Q6HRS **AND** Accu-Chek Q6 if NPO, , , Q6H **AND** NOTIFY MD and PharmD, , , Once **AND** glucose 4 g chewable tablet 16 g, 16 g, Oral, Q15 MIN PRN **AND** dextrose 50%  "(D50W) injection 25 mL, 25 mL, Intravenous, Q15 MIN PRN, Henrry Ying M.D.    Current Outpatient Prescriptions:   •  hydrALAZINE (APRESOLINE) 25 MG Tab, Take 1 tablet 3 times daily, Disp: 90 Tab, Rfl: 11  •  carvedilol (COREG) 6.25 MG Tab, Take 1 Tab by mouth 2 times a day, with meals., Disp: 60 Tab, Rfl: 11  •  losartan (COZAAR) 50 MG Tab, Take 1 Tab by mouth 2 Times a Day., Disp: 60 Tab, Rfl: 11  •  gabapentin (NEURONTIN) 300 MG Cap, Take 1 capsule twice daily, Disp: 60 Cap, Rfl: 3  •  Naproxen Sodium (ALEVE PO), Take  by mouth., Disp: , Rfl:     Allergies:    Patient has no known allergies.    Past Surgical History:    Past Surgical History:   Procedure Laterality Date   • HYSTERECTOMY RADICAL  4/2000   • HERNIA REPAIR  1963       Past Medical History:    Past Medical History:   Diagnosis Date   • Hernia    • History of chicken pox    • Hypertension    • Sleep apnea     non-compliant with CPAP   • Tobacco use disorder     28 py smoking history       Social History:    Social History     Social History   • Marital status:      Spouse name: N/A   • Number of children: N/A   • Years of education: N/A     Occupational History   • Not on file.     Social History Main Topics   • Smoking status: Current Some Day Smoker     Packs/day: 1.00     Years: 28.00     Types: Cigarettes     Last attempt to quit: 4/29/2017   • Smokeless tobacco: Never Used   • Alcohol use Yes      Comment: \"occasionally\"   • Drug use: No   • Sexual activity: Not on file     Other Topics Concern   • Not on file     Social History Narrative   • No narrative on file       Family History:    Family History   Problem Relation Age of Onset   • Other Mother         adopted       Review of System:    Review of Systems   Unable to perform ROS: Medical condition       Physical Examination:    Pulse 97   Temp (!) 38.8 °C (101.8 °F)   Resp 18   Ht 1.803 m (5' 11\")   Wt (!) 134 kg (295 lb 6.7 oz)   SpO2 95%   BMI 41.20 kg/m²   Physical " Exam   Constitutional:   Sedated on ventilator   HENT:   Head: Normocephalic and atraumatic.   Right Ear: External ear normal.   Left Ear: External ear normal.   Nose: Nose normal.   Dry mucous membranes   Eyes: Pupils are equal, round, and reactive to light. Conjunctivae and EOM are normal. Right eye exhibits no discharge. Left eye exhibits no discharge. No scleral icterus.   Neck: Normal range of motion. Neck supple. No JVD present. No tracheal deviation present.   Cardiovascular: Intact distal pulses.  Exam reveals no gallop and no friction rub.    No murmur heard.  Sinus rhythm   Pulmonary/Chest: No stridor. She has no wheezes. She has rales (Coarse crackles bilaterally).   Abdominal: Soft. Bowel sounds are normal. She exhibits no distension. There is no tenderness. There is no rebound.   Musculoskeletal: She exhibits no tenderness or deformity.   No clubbing or cyanosis   Neurological:   Sedated on propofol   Skin: She is not diaphoretic.   She has wounds on the back of both of her lower legs as well as on the back of the right shoulder.       Laboratory Data:    Recent Labs      08/07/18   2207   ISTATAPH  7.250*   ISTATAPCO2  50.4*   ISTATAPO2  82   ISTATATCO2  24   RTSIINB2EGQ  94   ISTATARTHCO3  22.1   ISTATARTBE  -6*   ISTATTEMP  39.3 C   ISTATFIO2  60   ISTATSPEC  Arterial   ISTATAPHTC  7.219*   PYXSUUPG2EA  95*     Recent Labs      08/07/18 2034   WBC  16.3*   RBC  5.82*   HEMOGLOBIN  17.2*   HEMATOCRIT  53.3*   MCV  91.6   MCH  29.6   MCHC  32.3*   RDW  52.0*   PLATELETCT  254   MPV  10.2     Recent Labs      08/07/18   2130   SODIUM  144   POTASSIUM  3.1*   CHLORIDE  117*   CO2  19*   GLUCOSE  121*   BUN  28*   CREATININE  1.29   CALCIUM  5.5*         Recent Labs      08/07/18 2034 08/07/18   2130   CPKTOTAL   --   2390*   TROPONINI  0.12*   --            Imaging:    I personally viewed the CXR and CT scan images as well as reviewed the radiology interpretation reports.    CT-CTA CHEST PULMONARY  ARTERY W/ RECONS   Final Result      1.  No filling defects within the pulmonary arteries to indicate emboli.   2.  Prominent central pulmonary vessels suggesting pulmonary hypertension.   3.  RIGHT mainstem intubation with LEFT lower lobe atelectasis.      These findings were discussed with EUSEBIO SILVERIO on 8/8/2018 12:52 AM.         CT-CSPINE WITHOUT PLUS RECONS   Final Result      1.  Moderate to severe multilevel degenerative change of cervical spine.   2.  No acute fracture or subluxation.      CT-HEAD W/O   Final Result      1.  No acute intracranial abnormality.   2.  LEFT parietal scalp swelling.   3.  Chronic paranasal sinus disease.      DX-CHEST-PORTABLE (1 VIEW)   Final Result      1.  Supportive tubing as described above.   2.  Hypoinflation with elevated RIGHT hemidiaphragm.   3.  LEFT lung base atelectasis.      DX-CHEST-PORTABLE (1 VIEW)    (Results Pending)   ECHOCARDIOGRAM COMP W/O CONT    (Results Pending)       Assessment and Plan:    Acute hypoxemic respiratory failure   -Intubated on 8/7   -Continue full vent support   -All appropriate ventilator bundles initiated    Acute unspecified encephalopathy   -CT scan of the head without acute pathology   -CSF with 36 white blood cells and 60% polys with elevated protein    Severe sepsis with associated acute respiratory failure and encephalopathy   -Pulmonary and possible CNS sources   -I will give the appropriate amount of IV fluids, 30 mL/kg   -Lactic acid is normal   -Culture blood, sputum, urine and CSF    Query meningitis   -I will start empiric vancomycin, ampicillin and Rocephin at the appropriate doses    Aspiration pneumonia   -I will start empiric Rocephin and ampicillin    Hypokalemia   -Replete potassium    Hypocalcemia   -Replete calcium    Hypomagnesemia   -Replete magnesium    Rhabdomyolysis   -Follow CPK   -IV fluid resuscitation    History of hypertension    History of JENNIFER    Obesity    Wounds on right shoulder and posterior lower  legs   -Wound care      I have assessed and reassessed her respiratory status and made ventilator adjustments based upon arterial blood gas analysis as well as analysis of ventilator waveforms and airway mechanics.  I have assessed and reassessed her blood pressure, hemodynamics, cardiovascular status and neurologic status with titration of propofol.  She is at increased risk for worsening respiratory, cardiovascular and CNS system dysfunction.    High risk of deterioration and worsening vital organ dysfunction and death without the above critical care interventions.    Thank you for allowing me to participate in the care of this lady.  I will continue to follow her with great interest.    Critical Care Time:  91 minutes  [id number]  No time overlap  Time excludes procedures  Discussed with RN, RT, Clinical pharmacist, ER physician    Henrry Ying MD  Pulmonary and Critical Care Medicine     no discrete location documentation necessary

## 2018-08-08 NOTE — PROGRESS NOTES
Internal Medicine Interval Note  Note Author: Yris Mabry M.D.     Name Salome Quinones 1960   Age/Sex 58 y.o. female   MRN [id number]   Code Status FULL      After 5PM or if no immediate response to page, please call for cross-coverage  Attending/Team: Dr. Martinez / HUMPHREY Sheehan See Patient List for primary contact information  Call [phone] to page    1st Call - Day Intern (R1):   N/A 2nd Call - Day Sr. Resident (R2/R3):   Dr. Mabry         Reason for interval visit  (Principal Problem)   Severe sepsis (HCC)    Interval Problem Daily Status Update  (24 hours)     ID : This 58 year-old lady with a PMHx of HTN was found unresponsive on the floor by her  on 2018 evening. EMS were contacted. She was in respiratory distress with RR of 40 when EMS arrived, with /110, Temp of 104 F and blood glucose of 110. She was intubated at the scene. In [facility], she was febrile with temp of 38.8 F, tachycardic with -120. She had leucocytosis of 16.3 on admission with lactate of 2, and scored 4/4 on SIRS and 2 on qSOFA. CT Head did not reveal any acute intracranial abnormality. CT C-spine showed some degenerative changes. CXR revealed bibasilar atelectasis. CTPA was negative for pulmonary embolus but did show some prominent pulmonary vessels in keeping with pulmonary hypertension. A lumbar puncture was performed. CSF showed clear fluid, WBC 36, , with mildly elevated protein of 88. Gram stain and culture negative so far. Blood cultures negative so far. Bronchoscopy revealed yellow secretions bilaterally, BAL was sent for culture. Urine analysis revealed nitrites, leucocyte esterase, WBC 5-10. She was started on ampicillin, ceftriaxone, vancomycin and acyclovir for severe sepsis with possible sources being meningitis vs aspiration pneumonia vs UTI. Procalcitonin elevated at 0.36. Electrolyte abnormalities repleted on admission. CPK elevated at 2390. She went into atrial fibrillation  with RVR and was given amiodarone.     Interval events 8/8/2018 :  - Temp 38.6 C  - was 140s-160s early am, AFib RVR  - SBP 90s - 120  - on vent support  - propofol  - not on vasopressors  - mild renal impairment today  - on  ml/hr  - home anti-HTN sives being held    Plan  - continue amoxicillin, vanc, ceftraixone, acyclovir  - follow-up on blood, urine, CSF and BAL cultures  - for one dose of steroid dex 10 mg   - continue amiodarone in view of AFib on & off  - continue IVF  - ID consult --> Dr. Kiran informed, he will kindly review patient today and provide guidance.    Review of Systems   Unable to perform ROS: Intubated       Consultants/Specialty  PMA - Dr. Martinez  ID - Dr. Kiran  PCP: @PCP    Disposition  ICU    Quality Measures  Quality-Core Measures   Reviewed items::  EKG reviewed, Medications reviewed, Labs reviewed and Radiology images reviewed  Raygoza catheter::  Critically Ill - Requiring Accurate Measurement of Urinary Output  Central line in place:  Sepsis  DVT prophylaxis pharmacological::  Enoxaparin (Lovenox)  DVT prophylaxis - mechanical:  SCDs  Ulcer Prophylaxis::  Yes  Antibiotics:  Treating active infection/contamination beyond 24 hours perioperative coverage          Physical Exam       Vitals:    08/08/18 0800 08/08/18 0900 08/08/18 0952 08/08/18 1000   Pulse: 80 79  80   Resp: (!) 28 (!) 28  (!) 28   Temp: (!) 38.6 °C (101.5 °F)      SpO2: 99%  94%    Weight:       Height:         Body mass index is 41.2 kg/m². Weight: (!) 134 kg (295 lb 6.7 oz)  Oxygen Therapy:  Pulse Oximetry: 94 %, FiO2%: 30 %, O2 Delivery: Ventilator    Physical Exam   Constitutional: She is well-developed, well-nourished, and in no distress. No distress.   Intubated and ventilated   HENT:   Head: Normocephalic and atraumatic.   Eyes: Conjunctivae are normal. No scleral icterus.   Neck: No JVD present.   Cardiovascular:   Tachycardic, irregularly irregular   Pulmonary/Chest: She has no wheezes.   Intubated and  ventilated   Abdominal: Soft. Bowel sounds are normal. She exhibits no distension. There is no tenderness. There is no rebound and no guarding.   Musculoskeletal:   Hyperkeratinization B/L LE   Lymphadenopathy:     She has no cervical adenopathy.   Neurological:   Intubated   Skin: She is not diaphoretic.         Lab Data Review:         8/8/2018  11:03 AM    Recent Labs      08/07/18 2130 08/08/18   0800   SODIUM  144  139   POTASSIUM  3.1*  3.8   CHLORIDE  117*  109   CO2  19*  24   BUN  28*  34*   CREATININE  1.29  1.47*   MAGNESIUM  1.5  2.5   PHOSPHORUS  3.4  3.7   CALCIUM  5.5*  8.2*       Recent Labs      08/07/18 2130 08/08/18   0800   ALTSGPT  33   --    ASTSGOT  61*   --    ALKPHOSPHAT  68   --    TBILIRUBIN  0.9   --    GLUCOSE  121*  116*       Recent Labs      08/07/18 2034 08/08/18   0800   RBC  5.82*  4.75   HEMOGLOBIN  17.2*  13.8   HEMATOCRIT  53.3*  43.6   PLATELETCT  254  183       Recent Labs      08/07/18 2034 08/07/18 2130 08/08/18   0800   WBC  16.3*   --   15.2*   NEUTSPOLYS  73.60*   --   63.90   LYMPHOCYTES  12.10*   --   19.00*   MONOCYTES  13.40   --   16.20*   EOSINOPHILS  0.00   --   0.00   BASOPHILS  0.30   --   0.50   ASTSGOT   --   61*   --    ALTSGPT   --   33   --    ALKPHOSPHAT   --   68   --    TBILIRUBIN   --   0.9   --            Assessment/Plan     * Severe sepsis (HCC)   Assessment & Plan    - Admitted following found unresponsive on the floor on 8/7/18 night with temp 104 F, /100 and blood glucose 110, RR 40 and trismus, intubated by EMS on the scene after fentanyl, rocuronium, versed, ketamine, 2 lts IVF  - In ER, , /117, temp 38.8 C with WBC 16.3, lactate was 2 on admission. WBC down to 15.3 on 8/8/18, lactate down to 1.8  - SIRS 4/4, qSOFA 2.   - CXR initial 8/7 : LLL atelectasis, intubated into right bronchus, ETT repositioned. CXR 8/8/18 - Bibasilar atelectasis, ETT in trachea  - UA : nitrites +ve, small LE, WBC 5-10, negative for  bacteria. HSV and gram stain awaited.   - CT Head : nil acute  - CTPA : no PE, prominent pulm vessels in keeping with pHTN  - CT spine : DJD, no fracture  - LP overnight 8/7 : clear CSF, WBC 36, , protein 88, glucose 40  - Bronchoscopy : bilateral yellow secretions  - procal 0.36  - not on pressors  Imp : Severe sepsis of unclear source - could be meningeal vs pulmonary vs UTI    Plan  - continue ampicillin, rocephine, vancomycin and Acyclovir  - await LP, blood and BAL cultures  -  ml/hr  - one dose of dexamethasone 10 mg  - For ID consult --> Dr. Kiran informed          Encephalopathy acute   Assessment & Plan    - found unresponsive on the floor on 8/7/18 night  - tylenol level < 10, BAL 0, salicylate 0  - CT Head - nil acute  - CT spine - no fracture  - could be due to severe sepsis, potential source - meningeal vs pulmonary vs UTI  - currently intubated and ventilated  - on antibiotics and acyclovir  - for a dose of dexamethasone and ID consult        Aspiration pneumonia (HCC)   Assessment & Plan    - could be possible source of sepsis given yellow secretions on bronchoscopy.  - on rocephine, IVF  - intubated, continue vent support per PMA and RT  - DUONEBS        Acute hypoxemic respiratory failure (HCC)   Assessment & Plan    - Intubated by EMS due to tachypnea, respiratory and metabolic acidosis on initial ABG, improved this am  - continue vent settings per PMA, RT  - Duonebs  - on rocephine for possible aspiration pneumonia        Atrial fibrillation with RVR (HCC)   Assessment & Plan    - new onset overnight in the context of sepsis  - had amiodarone IV  - was in NSR this early am., but now back in AFib RVR  - continue amiodarone        Hyperglycemia   Assessment & Plan    - mild 121 8/7, expected in the current septic phase  - No known history of diabetes.    - A1c April 2017 was 5.5  - BG at scene by EMS was 110  - insulin sliding scale        Atelectasis   Assessment & Plan    -  Documented on CXR  - CTM        Abnormal urinalysis   Assessment & Plan    - U/A on 8/7/2018 : nitrite positive, small amount leucocyte esterase, WBC 5-10, no bacteria  - await culture  - on rocephine        Elevated troponin   Assessment & Plan    - On admission trop 0.12.  EKG no st/t changes  - ctm        Rhabdomyolysis   Assessment & Plan    - Patient found down unresponsive.   - CPK on admission 2390   - BUN/Creat 28/1.29, eGFR 43, low K at 3.1  - LR @ 125  - CTM        Hypocalcemia   Assessment & Plan    - adjusted calcium was 6.46 on admission 8/7  - had 1 gm calcium gluconate and 1 gm calcium chloride since admission  - 8/8/18, ionized calcium was 1.1  - ctm and replete as needed          Hypomagnesemia   Assessment & Plan    - Mag 1.5  - had 4 gms of IV Mag  - Mag this am is 2.5        Hypokalemia   Assessment & Plan    - K was 3.1 on admission  - had K repletion  - K this am 8/8 is 3.8  - for 20 of IV K        Pulmonary hypertension (HCC)   Assessment & Plan    - CTPA showed prominent pulmonary vessels suggesting pulmonary hypertension   - a/w TTE        Essential hypertension- (present on admission)   Assessment & Plan    - Home meds :  hydralazine, carvedilol, losartan  - not on pressors at present  - Holding home antihypertensives at present

## 2018-08-08 NOTE — ED NOTES
Pt  Krish called    Reports Sunday he knew pt was sick when she didn't make dinner. Monday she was hot and sweaty and didn't feel good; he offered her to go to the hospital, she declined. Last night she seemed the same. Slept on the couch and fell on the floor during the night. He saw her there this am and thought she might be breathing shallow and heavy but unsure if any different from regular sleep apnea. He had been in and out of the house all day and this evening when she hadn'tm elva from the floor he called EMS. Reports her to be incoherent all day. Pt has CPAP but does not use it.

## 2018-08-08 NOTE — ASSESSMENT & PLAN NOTE
Patient was extubated 8/12/18. Tolerating well. Saturating at 96% on 2L, RR 17, not in visible respiratory distress.

## 2018-08-08 NOTE — ED TRIAGE NOTES
"Salome Quinones    Chief Complaint   Patient presents with   • ALOC     found down by   this evening; care flight junction around 1945; emesis with likely aspiration; intubated; last seen well on sunday;  thought she was sleeping today     Pt had 100 Asad and 300 ketamine around 1945 during intubation; later 100 fentanyl and 5 versed.     No response to pain, flaccid, pupils reactive; pt has only slightly been biting on ET propofol started; BP normalizing; see MAR.     Family on their way.     Pt takes losartan, hydralazine and carvedilol at home.     NIBP: (!) 206/102, NIBP MAP (Calculated): 138, Heart Rate (Monitored): (!) 110, Pulse: (!) 106, Respiration: (!) 11, Temperature: (!) 38.8 °C (101.8 °F), Height: 180.3 cm (5' 11\"), Weight: (!) 134 kg (295 lb 6.7 oz), Pulse Oximetry: 96 %      "

## 2018-08-08 NOTE — ASSESSMENT & PLAN NOTE
Assessment: Patient stable on home medications    Plan:  Discharge on home coreg, losartan, hydralazine

## 2018-08-08 NOTE — DISCHARGE PLANNING
Medical Social Work    Referral: Critical Patient    Intervention: MSW responded to BL20 for a critical pt.  Pt is a 58 year old female brought in by [facility] from home ([address] [place] [id number]).  Pt is Salome Valadezan (: 1960).  Per [facility] pt was found by her family unresponsive.  Pt was intubated in field.  Per report pt was last seen normal by family on  night.  Pt's family is on their way to the hospital.  ER Carlos notified to contact this worker when pt's family arrives.      Plan: SW will remain available.

## 2018-08-08 NOTE — PROGRESS NOTES
0415- MD Ying to bedside for bronchoscopy. Pt tolerated procedure well. VSS, close monitoring in progress.

## 2018-08-08 NOTE — ASSESSMENT & PLAN NOTE
CTPA showed prominent pulmonary vessels suggesting pulmonary hypertension. TTE on 8/8/2018 : normal LV systolic function, EF 60%

## 2018-08-08 NOTE — PROGRESS NOTES
0327- Bedside report received from SHAVON George. Pt transferred to [facility] via gurney with RN and RT and transferred to ICU bed. VSS, pt grimacing to pain, not responding to commands at this time. Assessment completed, CHG and full bed bath completed as well. Close monitoring in progress.

## 2018-08-08 NOTE — ED NOTES
Pt transferred to ICU. Intubated, no response to pain, sedated on propofol drip. On monitor and vent; 2 RNs and RT transporting.

## 2018-08-08 NOTE — ED NOTES
CT called; awaiting trauma on table and another one on its way then patient is next; explained pt status.

## 2018-08-08 NOTE — WOUND TEAM
Pt seen in T630. Pt intubated and restrained. Niece at bedside. BLE BARBIE. Legs cleansed with moist warm washcloth and no rinse soap. Legs are cool. Pt has bounding DP palpable pulses. Unable to palpate PT Bilaterally. Bilateral DP and PT pulses were dopplered and appeared to sound multiphasic. BLE have hemosiderin staining and old scars from previous wounds. BLE achilles areas have what appear to be calloused firm areas. Pts niece states pt is in a motorized wheelchair and walks minimally. Pt does not have any open wounds to care for. BLE placed in heel float boots. Pt is on a waffle overlay. Pt was turned to the left side to assess sacrum/coccyx. Sacral mepilex removed. Pt has an incontinent BM. Pt was cleansed with wipes and new dannie was applied. Pt was returned to the supine position. No advanced wound care needs identified. Wound team signing off. No further follow up unless consulted.

## 2018-08-08 NOTE — PROCEDURES
Date of Procedure:  8/8/2018    Title of Procedure:  Diagnostic and therapeutic flexible fiberoptic bronchoscopy with bronchoalveolar lavage    Indication for Procedure:   Atelectasis    Post-procedure Diagnoses:    1.  Normal endobronchial anatomy  2.  No endobronchial tumor identified  3.  Moderate yellow secretions seen bilaterally.  All secretions suctioned until clear.    Narrative:    The patient was sedated, intubated and ventilated at the time of this procedure.  The flexible fiberoptic bronchoscope was inserted through the lumen of the endotracheal tube and advanced into the distal trachea without difficulty.  The airways were examined to the subsegmental bronchus level bilaterally.    The endobronchial anatomy was normal.  No tumor was identified.    There was a moderate amount of yellow secretions seen bilaterally.  All the secretions were suctioned until clear.    Bronchoalveolar lavage was carried out in the basilar segments of the left lower lobe using the standard technique with good fluid return.    Bronchoalveolar lavage fluid from the left lower lobe is submitted to the laboratory for cytology, gram stain, culture and sensitivity, acid fast bacilli smear and culture and fungal culture.    The patient tolerated the procedure quite nicely.  No complications were apparent.  The heart rate and rhythm, blood pressure and oxygenation saturation were continuously monitored.      Henrry Ying MD  Pulmonary and Critical Care Medicine

## 2018-08-08 NOTE — CONSULTS
Full consult dictated  Assess: encephalopathy from resp failure due to pneumococcal pna versus viral encephalitis     REC: stop vanco/ampicillin             Continue ceftriaxone daily to cover pna              Cont acyclovir pending HSV pcr result from CSF fluid. If negative stop the acyclovir              Order serum WNV serology     Thanks for consult

## 2018-08-08 NOTE — ASSESSMENT & PLAN NOTE
Resolved. Patient has not received a dose of Insulin while in hospital.   Plan  - D/C ISS and accuchecks

## 2018-08-08 NOTE — ED NOTES
Emily from Lab called with critical result of Calcium 5.5 and CPK 2390 at 2241. Critical lab result read back to Emily.   Dr. Joyce notified of critical lab result at 2241.  Critical lab result read back by Dr. Joyce.

## 2018-08-08 NOTE — PROGRESS NOTES
0500-   MD Ying to bedside for Central Line insertion for lack of periphal access. Pt converted into AFIB -170 with subsequent hypotension. STAT EKG and Chest Xray ordered with verbal order from MD

## 2018-08-08 NOTE — ASSESSMENT & PLAN NOTE
U/A on 8/7/2018 : nitrite positive, small amount leucocyte esterase, WBC 5-10, no bacteria. Patient received 7 days of rocephin.

## 2018-08-08 NOTE — ASSESSMENT & PLAN NOTE
On admission trop 0.12.  EKG no st/t changes. No further trending, likely secondary to demand due to sepsis.

## 2018-08-08 NOTE — ED PROVIDER NOTES
"ED Provider Note    Scribed for No att. providers found by Waldo Nazario. 8/7/2018, 8:22 PM.    Primary care provider: Pcp Pt States None  Means of arrival: Ambulance  History obtained from: EMS  History limited by: Patient's critical condition.    CHIEF COMPLAINT  Unresponsive    HPI  Salome Quinones is a 58 y.o. female who presents to the Emergency Department for evaluation of unresponsiveness onset just prior to arrival. Per EMS, the patient was last seen acting normally 2 nights ago. Her  checked on her today and found her down on the ground. He tried to arouse the patient but she was unresponsive. When EMS arrived to the patient, she was breathing about 40 times per minute, resulting in patient intubation. They also noted that she had trismus with initial contact. EMS reported that the patient had a temperature of 104 °F, blood pressures around 150/100, BGL of 110, and pupils at about 3 mm. EMS gave the patient 2 liters of fluid, Ketamine and Rocuronium for intubation, Fentanyl, and Versed en route. The patient has a history of hypertension.     HPI is limited secondary to patient's critical condition. The patient is intubated.       REVIEW OF SYSTEMS  ROS is limited secondary to patient's critical condition. See HPI for details.  C.     PAST MEDICAL HISTORY   has a past medical history of Hernia; History of chicken pox; Hypertension; and Tobacco use disorder.    SURGICAL HISTORY   has a past surgical history that includes hysterectomy radical (4/2000) and hernia repair (1963).    SOCIAL HISTORY  Social History   Substance Use Topics   • Smoking status: Current Some Day Smoker     Packs/day: 1.00     Years: 28.00     Types: Cigarettes     Last attempt to quit: 4/29/2017   • Smokeless tobacco: Never Used   • Alcohol use Yes      Comment: \"occasionally\"      History   Drug Use No       FAMILY HISTORY  Family History   Problem Relation Age of Onset   • Other Mother         adopted       CURRENT " MEDICATIONS  Home Medications     Reviewed by Keren Patricio R.N. (Registered Nurse) on 08/07/18 at 2159  Med List Status: Partial   Medication Last Dose Status   carvedilol (COREG) 6.25 MG Tab  Active   gabapentin (NEURONTIN) 300 MG Cap not taking Active   hydrALAZINE (APRESOLINE) 25 MG Tab  Active   losartan (COZAAR) 50 MG Tab  Active   Naproxen Sodium (ALEVE PO) 4/29/2017 Active                ALLERGIES  No Known Allergies    PHYSICAL EXAM  VITAL SIGNS: Wt (!) 134 kg (295 lb 6.7 oz)   SpO2 95%   BMI 41.20 kg/m²     Constitutional: Well developed, Well nourished, No acute distress, Non-toxic appearance.   HENT: Normocephalic, Atraumatic, TMs normal, mucous membranes moist, no erythema, exudates, swelling, or masses, nares patent  Eyes: nonicteric. PERRL 3 mm.   Neck: Supple, no meningismus  Lymphatic: No lymphadenopathy noted.   Cardiovascular: Tachycardic and regular rhythm, no gallops rubs or murmurs  Lungs: Intubated. Lungs have occasional crackles and wheezes on the left.   Abdomen: Bowel sounds normal, Soft, nondistended.  Skin: Warm, Dry, no rash. No visualized trauma.  Back: No step off or crepitus  Genitalia: Deferred  Rectal: Deferred  Extremities: Wraps to both legs with a superficial ulcer to the right achilles area with no erythema.   Neurologic: Paralyzed, no response to painful stimuli.   Psychiatric: Affect normal    DIAGNOSTIC STUDIES / PROCEDURES    LABS  Results for orders placed or performed during the hospital encounter of 08/07/18   HCG QUAL SERUM   Result Value Ref Range    Beta-Hcg Qualitative Serum Negative Negative   CBC WITH DIFFERENTIAL   Result Value Ref Range    WBC 16.3 (H) 4.8 - 10.8 K/uL    RBC 5.82 (H) 4.20 - 5.40 M/uL    Hemoglobin 17.2 (H) 12.0 - 16.0 g/dL    Hematocrit 53.3 (H) 37.0 - 47.0 %    MCV 91.6 81.4 - 97.8 fL    MCH 29.6 27.0 - 33.0 pg    MCHC 32.3 (L) 33.6 - 35.0 g/dL    RDW 52.0 (H) 35.9 - 50.0 fL    Platelet Count 254 164 - 446 K/uL    MPV 10.2 9.0 - 12.9 fL     Neutrophils-Polys 73.60 (H) 44.00 - 72.00 %    Lymphocytes 12.10 (L) 22.00 - 41.00 %    Monocytes 13.40 0.00 - 13.40 %    Eosinophils 0.00 0.00 - 6.90 %    Basophils 0.30 0.00 - 1.80 %    Immature Granulocytes 0.60 0.00 - 0.90 %    Nucleated RBC 0.00 /100 WBC    Neutrophils (Absolute) 12.02 (H) 2.00 - 7.15 K/uL    Lymphs (Absolute) 1.97 1.00 - 4.80 K/uL    Monos (Absolute) 2.18 (H) 0.00 - 0.85 K/uL    Eos (Absolute) 0.00 0.00 - 0.51 K/uL    Baso (Absolute) 0.05 0.00 - 0.12 K/uL    Immature Granulocytes (abs) 0.10 0.00 - 0.11 K/uL    NRBC (Absolute) 0.00 K/uL   LACTIC ACID   Result Value Ref Range    Lactic Acid 2.0 0.5 - 2.0 mmol/L   LACTIC ACID   Result Value Ref Range    Lactic Acid 1.8 0.5 - 2.0 mmol/L   URINALYSIS   Result Value Ref Range    Color DK Yellow     Character Turbid (A)     Specific Gravity 1.027 <1.035    Ph 5.0 5.0 - 8.0    Glucose Negative Negative mg/dL    Ketones Trace (A) Negative mg/dL    Protein 300 (A) Negative mg/dL    Bilirubin Moderate (A) Negative    Urobilinogen, Urine 1.0 Negative    Nitrite Positive (A) Negative    Leukocyte Esterase Small (A) Negative    Occult Blood Large (A) Negative    Micro Urine Req Microscopic    TROPONIN   Result Value Ref Range    Troponin I 0.12 (H) 0.00 - 0.04 ng/mL   COMP METABOLIC PANEL   Result Value Ref Range    Sodium 144 135 - 145 mmol/L    Potassium 3.1 (L) 3.6 - 5.5 mmol/L    Chloride 117 (H) 96 - 112 mmol/L    Co2 19 (L) 20 - 33 mmol/L    Anion Gap 8.0 0.0 - 11.9    Glucose 121 (H) 65 - 99 mg/dL    Bun 28 (H) 8 - 22 mg/dL    Creatinine 1.29 0.50 - 1.40 mg/dL    Calcium 5.5 (LL) 8.5 - 10.5 mg/dL    AST(SGOT) 61 (H) 12 - 45 U/L    ALT(SGPT) 33 2 - 50 U/L    Alkaline Phosphatase 68 30 - 99 U/L    Total Bilirubin 0.9 0.1 - 1.5 mg/dL    Albumin 2.8 (L) 3.2 - 4.9 g/dL    Total Protein 5.1 (L) 6.0 - 8.2 g/dL    Globulin 2.3 1.9 - 3.5 g/dL    A-G Ratio 1.2 g/dL   MAGNESIUM   Result Value Ref Range    Magnesium 1.5 1.5 - 2.5 mg/dL   PHOSPHORUS   Result  Value Ref Range    Phosphorus 3.4 2.5 - 4.5 mg/dL   CREATINE KINASE   Result Value Ref Range    CPK Total 2390 (HH) 0 - 154 U/L   TRIGLYCERIDE   Result Value Ref Range    Triglycerides 90 0 - 149 mg/dL   SALICYLATE   Result Value Ref Range    Salicylates, Quant. 0 (L) 15 - 25 mg/dL   DIAGNOSTIC ALCOHOL   Result Value Ref Range    Diagnostic Alcohol 0.00 0.00 g/dL   ACETAMINOPHEN   Result Value Ref Range    Acetaminophen -Tylenol <10 10 - 30 ug/mL   URINE MICROSCOPIC (W/UA)   Result Value Ref Range    WBC 5-10 (A) /hpf    RBC 5-10 (A) /hpf    Bacteria Negative None /hpf    Epithelial Cells Many (A) /hpf   ESTIMATED GFR   Result Value Ref Range    GFR If  52 (A) >60 mL/min/1.73 m 2    GFR If Non  43 (A) >60 mL/min/1.73 m 2   EKG (NOW)   Result Value Ref Range    Report       [facility] Emergency Dept.    Test Date:  2018  Pt Name:    LIBBY RIGGINS              Department: ER  MRN:        [id number]                      Room:       [facility]  Gender:     Female                       Technician: [id number]  :        1960                   Requested By:ER TRIAGE PROTOCOL  Order #:    [id number]                    Reading MD:    Measurements  Intervals                                Axis  Rate:       111                          P:          69  KY:         156                          QRS:        101  QRSD:       102                          T:          65  QT:         372  QTc:        506    Interpretive Statements  SINUS TACHYCARDIA  RIGHT AXIS DEVIATION  LATE PRECORDIAL R/S TRANSITION  CONSIDER LEFT VENTRICULAR HYPERTROPHY  BORDERLINE PROLONGED QT INTERVAL  No previous ECG available for comparison     ISTAT ARTERIAL BLOOD GAS   Result Value Ref Range    Ph 7.250 (LL) 7.400 - 7.500    Pco2 50.4 (H) 26.0 - 37.0 mmHg    Po2 82 64 - 87 mmHg    Tco2 24 20 - 33 mmol/L    S02 94 93 - 99 %    Hco3 22.1 17.0 - 25.0 mmol/L    BE -6 (L) -4 - 3 mmol/L    Body Temp 39.3 C  degrees    O2 Therapy 60 %    Ph Temp Sarthak 7.219 (LL) 7.400 - 7.500    Pco2 Temp Co 55.7 (HH) 26.0 - 37.0 mmHg    Po2 Temp Cor 95 (H) 64 - 87 mmHg    Specimen Arterial     Action Range Triggered YES     Inst. Qualified Patient YES        All labs reviewed by me.    EKG  EKG:   Obtained at 2028  Sinus Tachycardia  Rate 111  Rightward [place]  LVH   Intervals normal   No ST segment elevation or depression.   No T wave inversions    RADIOLOGY  DX-CHEST-PORTABLE (1 VIEW)   Final Result      1.  Supportive tubing as described above.   2.  Hypoinflation with elevated RIGHT hemidiaphragm.   3.  LEFT lung base atelectasis.      CT-HEAD W/O    (Results Pending)   CT-CTA CHEST PULMONARY ARTERY W/ RECONS    (Results Pending)   CT-CSPINE WITHOUT PLUS RECONS    (Results Pending)   DX-CHEST-PORTABLE (1 VIEW)    (Results Pending)     The radiologist's interpretation of all radiological studies have been reviewed by me.    Lumbar Puncture Procedure Note    Indication: to obtain spinal fluid for diagnostic testing    Consent: Unable to be obtained due to patient's condition.    Procedure: The patient was placed in the left lateral decubitus position and the appropriate landmarks were identified. The area was prepped and draped in the usual sterile fashion. Anesthesia was obtained using 3 cc of 1% Lidocaine without epinephrine. A spinal needle was inserted at the L4- L5 level with the stylet in place until spinal fluid was returned. Opening pressure was not measured. At this point 3.0 cc of clear cerebral spinal fluid was obtained and sent for appropriate testing. The stylet was then replaced and the needle was withdrawn. A sterile dressing was placed over the site and the patient was placed in the supine position.    The patient tolerated the procedure well.    Complications: None        COURSE & MEDICAL DECISION MAKING  Nursing notes, VS, PMSFHx reviewed in chart.     8:22 PM Patient seen and examined at bedside. The patient presents  with altered mental status and the differential diagnosis includes but is not limited to sepsis, drug overdose, intracranial bleed, meningitis. Ordered for Dx-chest, CT-Cspine, CT-head, CT-CTA Chest pulmonary artery,  Lactic Acid, Triglycerides, Creatine Kinase, Troponin, Magnesium, Phosphorus, HCG Qual Serum, CBC, CMP, Lactic Acid, blood culture, UA, and EKG  to evaluate. Patient was treated with Tylenol 650 mg, Rocephin 2 g, Flagyl 500 mg, NS infusion 1500 mL for CT evaluation.    9:21 PM Patient reevaluated at bedside. She has not experienced significant change secondary to IV fluid administration.     11:11 PM Patient reevaluated at bedside.     11:40 PM Paged [facility].     12:13 AM Consult with [facility] who agrees to admit the patient.     12:32 AM Patient reevaluated at bedside after returning from CT.    12:36 AM I obtained and reviewed the patient's CT diagnostic results.    12:39 AM I performed a lumbar puncture procedure at this time. See above note for details.          CRITICAL CARE  The very real possibilty of a deterioration of this patient's condition required the highest level of my preparedness for sudden, emergent intervention.  I provided critical care services, which included medication orders, frequent reevaluations of the patient's condition and response to treatment, ordering and reviewing test results, and discussing the case with various consultants.  The critical care time associated with the care of the patient was 35 minutes. Review chart for interventions. This time is exclusive of any other billable procedures.     Decision Making:  This is a 58 y.o. year old female who presents essentially found down and intubated in the field. The patient apparently has been mostly sleeping over the last couple of days but this was not felt to be significant by her . Yesterday evening she rolled off the couch onto the floor and that did not alert him that something may have been wrong. On  arrival the patient is febrile. She was tachypneic prior to and after intubation. Workup here includes no evidence of PE. Clinically she appears to have aspirated as there were food contents noted on intubation in the larynx. The patient has evidence of UTI. However this evidence is somewhat mild. It does not completely explain her altered mental status. Imaging of her head demonstrates no bleed. CT chest also demonstrates that her tube was somewhat low although was seen to be normal on x-ray. This will be pulled back a couple of centimeters. Otherwise patient underwent lumbar puncture here-the fluid is clear but will be sent for laboratory analysis. Patient has been dosed with antibiotics. She will be admitted to [facility] team. She has been seen by pulmonology-Dr. Robbins. CPK is mildly elevated.    DISPOSITION:  Patient will be admitted to [facility] in critical condition.      FINAL IMPRESSION  1. Altered mental status, unspecified altered mental status type    2. Acute UTI    3. Aspiration into airway, initial encounter         The critical care time associated with the care of the patient was 35 minutes. Review chart for interventions. This time is exclusive of any other billable procedures.      IWaldo (Danielibkwaku), am scribing for, and in the presence of, No att. providers found.    Electronically signed by: Waldo Nazario (Gabriel), 8/7/2018    I, No att. providers found personally performed the services described in this documentation, as scribed by Waldo Nazario in my presence, and it is both accurate and complete.    The note accurately reflects work and decisions made by me.  Ej Joyce  8/8/2018  1:17 AM

## 2018-08-08 NOTE — ED NOTES
Tech from Lab called with critical result of csf wbc 36 and csf rbc 187 at 0220. Critical lab result read back to tech.   Dr. torres notified of critical lab result at 0220.  Critical lab result read back by Dr. torres.

## 2018-08-08 NOTE — PROGRESS NOTES
"Pharmacy Kinetics 58 y.o. female on vancomycin day # 1 2018    Currently on Vancomycin New Start  Other antibiotics/antiviral: ampicillin 2 g IV q4h, ceftriaxone 2 g IV q12h, acyclovir 700 mg IV q8h    Indication for Treatment: Empiric - rule out bacterial meningitis     Pertinent history per medical record: Admitted on 2018 after being found down by .  Patient upon arrival to ED required intubation and found to have leukocytosis and febrile (104 degrees Farenheit).  Lumbar puncture obtained while in ED and given findings, intensivist started empiric antibiotics and antiviral for meningitis.      Allergies: Patient has no known allergies.     List concerns for renal function: obese (BMI 41), BUN/SCr > 20:1    Pertinent cultures to date:   18 - BAL: collected  18 - Peripheral BC x 2: in process  18 - CSF fluid: in process     Recent Labs      18   2034   WBC  16.3*   NEUTSPOLYS  73.60*     Recent Labs      18   2130   BUN  28*   CREATININE  1.29   ALBUMIN  2.8*     No results for input(s): VANCOTROUGH, VANCOPEAK, VANCORANDOM in the last 72 hours.  Intake/Output Summary (Last 24 hours) at 18 0503  Last data filed at 18 0132   Gross per 24 hour   Intake            66.41 ml   Output                0 ml   Net            66.41 ml      Pulse 96, temperature (!) 38.8 °C (101.8 °F), resp. rate (!) 24, height 1.803 m (5' 11\"), weight (!) 134 kg (295 lb 6.7 oz), SpO2 96 %. Temp (24hrs), Av.8 °C (101.8 °F), Min:38.8 °C (101.8 °F), Max:38.8 °C (101.8 °F)      A/P   1. Vancomycin dose change: 3000 mg IV x 1  2. Next vancomycin level:  at 1600, ~12 hour level (ordered)  3. Goal trough: 18-22 mcg/mL   4. Comments: Multiple concerns for renal function at this time as noted above.  Due to patient's large volume of distribution, might tolerate more frequent dosing despite HAROON in short term.  Give patient 3000 mg IV x 1 and follow up with random level as above.  Clinical " pharmacist to follow-up with level and re-dose/initiate scheduled dosing as indicated.      Nadya Choi, PharmD, BCPS, BCCCP

## 2018-08-08 NOTE — PROGRESS NOTES
Pulmonary Critical Care Progress Note        Chief Complaint: Respiratory failure    History of Present Illness: I was kindly asked to see and evaluate Salome Quinones, a 58 y.o. female for evaluation and management of the above problem.     The entire history is obtained from healthcare providers and the medical record as this lady cannot give me any history.  This lady has a history of hypertension and sleep apnea.  Apparently she was sick on Sunday.  Monday she was hot and diaphoretic and did not feel good.  She declined to go to the hospital at that time.  Monday night she slept on the couch and apparently fell onto the floor during the night.  This morning she was on the floor, but her family felt that she was sleeping.  This evening, EMS was activated when the family realized that she had not moved from the floor all day.  There was evidence of emesis at the scene.  EMS found the patient have a temperature of 104°F and she was breathing 40 times per minute.  She was intubated and transported to [facility].    Please note above history was obtained by my ICU colleague Dr. Ying.     ROS:  Respiratory: unable to perform due to the patient's inability to effectively communicate, Cardiac: unable to perform due to the patient's inability to effectively communicate, GI: unable to perform due to the patient's inability to effectively communicate.  All other systems negative.    Interval Events:  24 hour interval history reviewed    -Intubated overnight for MS change and pneumonia  -Afib and RVR and on amiodarone gtt  -No vasopressors  -Loose stools and nasal trumpet  -BMS system to be placed instead and nasal trumpet to be removed  -Raygoza with good UO  -Ice packs for fever  -Right IJ  -Head CT and CT thorax - for PE  -WBC on LP  -Vent day 2, with RR 28, 430, 30% PEEP 8  -On prophylaxis  -?Viral meningitis  -Rocephin, vanco, acyclovir, decadron  -CK 2000 +  -ID consult recommended for possible CNS  infection    PFSH:  No change.    Respiratory:  [place] Vent Mode: APVCMV, Rate (breaths/min): 28, Vt Target (mL): 430, PEEP/CPAP: 8, FiO2: 80, Static Compliance (ml / cm H2O): 183, Control VTE (exp VT): 429  Pulse Oximetry: 98 %  Chest Tube Drains:          Exam: rhonchi bibasilar  ImagingCXR  I have personally reviewed the chest x-ray my impression is   right IJ central catheter in good position, ET tube in good position, and elevation of the right hemidiaphragm.  There is hazy alveolar infiltrates.   Recent Labs      08/07/18   2207  08/08/18   0545   ISTATAPH  7.250*  7.376*   ISTATAPCO2  50.4*  36.1   ISTATAPO2  82  60*   ISTATATCO2  24  22   BEOXRYS4OHM  94  90*   ISTATARTHCO3  22.1  21.1   ISTATARTBE  -6*  -3   ISTATTEMP  39.3 C  39.1 C   ISTATFIO2  60  80   ISTATSPEC  Arterial  Arterial   ISTATAPHTC  7.219*  7.345*   [id number]  95*  70       HemoDynamics:  Pulse: 68, Heart Rate (Monitored): (!) 162  NIBP: (!) 92/66       Exam: Irregularly irregular rhythm on amiodarone infusion. Patient at LR at 1 25 mL per hour.  Imaging: echo Reviewed   8/8/2018  Transthoracic  Echo Report      Echocardiography Laboratory    CONCLUSIONS  No prior study is available for comparison.   Normal left ventricular systolic function.  Left ventricular ejection fraction is visually estimated to be 60%.  Grade I diastolic dysfunction.  The right ventricle was normal in size and function.  No significant valve disease or flow abnormalities.   Recent Labs      08/07/18 2034  08/07/18   2130   CPKTOTAL   --   2390*   TROPONINI  0.12*   --        Neuro:  GCS Total [place] Coma Score: 7       Exam: Heavily sedated. Patient remains on propofol infusion. She withdraws to painful stimulus. Reflexes are equal upper and lower extremities. She has downgoing plantar reflexes. I do not detect obvious nuchal rigidity on exam.     Lumbar puncture data from 8/8/2018 at 120 in the a.m.:    Results for LIBBY RIGGINS (MRN [id number]) as of  8/8/2018 18:53   Ref. Range 8/8/2018 01:20   Number Of Tubes Unknown 4   Volume Latest Units: mL 3.0   Color-Body Fluid Unknown Colorless   Character-Body Fluid Unknown Clear   Total WBC Count Latest Ref Range: 0 - 10 cells/uL 36 (H)   Total RBC Count Latest Units: cells/uL 187   Crenated RBC Latest Units: % 0   Polys Latest Units: % 60   Lymphs Latest Units: % 33   Mononuclear Cells - CSF Latest Units: % 4   Unidentified Cells - CSF Latest Units: % 3   CSF Nucleated Red Blood Cell Unknown 1   CSF Tube Number Unknown 3   Glucose CSF Latest Ref Range: 40 - 80 mg/dL 79   Total Protein, CSF Latest Ref Range: 15 - 45 mg/dL 88 (H)     Imaging: Available data reviewed.  Impression       1.  No acute intracranial abnormality.  2.  LEFT parietal scalp swelling.  3.  Chronic paranasal sinus disease.     Fluids:  Intake/Output       08/06/18 0700 - 08/07/18 0659 (Not Admitted) 08/07/18 0700 - 08/08/18 0659 08/08/18 0700 - 08/09/18 0659      0700-1859 8710-4808 Total 2008-8938 9530-9273 Total 9448-7923 8073-3280 Total       Intake    I.V.  --  -- --  --  66.4 66.4  --  -- --    Propofol Volume -- -- -- -- 66.4 66.4 -- -- --    Total Intake -- -- -- -- 66.4 66.4 -- -- --       Output    Urine  --  -- --  --  500 500  --  -- --    Output (mL) (Urinary Catheter Indwelling Catheter) -- -- -- -- 500 500 -- -- --    Total Output -- -- -- -- 500 500 -- -- --       Net I/O     -- -- -- -- -433.6 -433.6 -- -- --        Weight: (!) 134 kg (295 lb 6.7 oz)  Recent Labs      08/07/18   2130   SODIUM  144   POTASSIUM  3.1*   CHLORIDE  117*   CO2  19*   BUN  28*   CREATININE  1.29   MAGNESIUM  1.5   PHOSPHORUS  3.4   CALCIUM  5.5*       GI/Nutrition:  Exam: abdomen is soft and non-tender. Patient is morbidly obese. Scars are present on surgery noted.  Imaging: None - Reviewed  NPO  Liver Function  Recent Labs      08/07/18   2130   ALTSGPT  33   ASTSGOT  61*   ALKPHOSPHAT  68   TBILIRUBIN  0.9   GLUCOSE  121*       Heme:  Recent Labs       18   RBC  5.82*   HEMOGLOBIN  17.2*   HEMATOCRIT  53.3*   PLATELETCT  254       Infectious Disease:  Monitored Temp 2  Av.3 °C (102.8 °F)  Min: 37.6 °C (99.7 °F)  Max: 40 °C (104 °F)  Temp  Av.8 °C (101.8 °F)  Min: 38.8 °C (101.8 °F)  Max: 38.8 °C (101.8 °F)  Micro: antibiotics reviewed. Patient on meningeal doses of vancomycin, ampicillin, Rocephin, acyclovir. Steroids were also given since steroids were not given initially.   Possible aseptic meningitis is possible.   Cultures from BAL data are showing many gram-positive cocci at this time.   Peripheral blood cultures remain negative at this time.   CSF is not revealing organisms. Fever is noted as well as leukocytosis.    Recent Labs      18   2130   WBC  16.3*   --    NEUTSPOLYS  73.60*   --    LYMPHOCYTES  12.10*   --    MONOCYTES  13.40   --    EOSINOPHILS  0.00   --    BASOPHILS  0.30   --    ASTSGOT   --   61*   ALTSGPT   --   33   ALKPHOSPHAT   --   68   TBILIRUBIN   --   0.9     Current Facility-Administered Medications   Medication Dose Frequency Provider Last Rate Last Dose   • propofol (DIPRIVAN) injection  0-80 mcg/kg/min Continuous eHnrry Ying M.D. 32.2 mL/hr at 18 0600 40 mcg/kg/min at 18 0600   • enoxaparin (LOVENOX) inj 40 mg  40 mg DAILY Liborio Johnson M.D.   40 mg at 18 0532   • cefTRIAXone (ROCEPHIN) 2 g in  mL IVPB  2 g Q12HRS Henrry Ying M.D.   Stopped at 18 0607   • MD ALERT... vancomycin per pharmacy protocol   pharmacy to dose Henrry Ying M.D.       • ampicillin (OMNIPEN) 2,000 mg in  mL IVPB  2,000 mg Q4HRS Henrry Ying M.D.   Stopped at 18 0628   • lactated ringers infusion   Continuous Henrry Ying M.D.   Stopped at 18 0500   • vancomycin 3,000 mg in  mL IVPB  3,000 mg Once Henrry MARISA Ying M.D. 166.7 mL/hr at 18 0533 3,000 mg at 18 0533   • magnesium sulfate IVPB  premix 4 g  4 g Once Henrry Ying M.D. 25 mL/hr at 08/08/18 0500 4 g at 08/08/18 0500   • acyclovir (ZOVIRAX) 700 mg in  mL IVPB  700 mg Q8HRS Henrry Ying M.D.       • amiodarone (CORDARONE) 450 mg in D5W 250 mL Infusion  0.5-1 mg/min Continuous Henrry Ying M.D. 33 mL/hr at 08/08/18 0557 1 mg/min at 08/08/18 0557   • potassium chloride in water (KCL) ivpb **Administer in ICU only** 40 mEq  40 mEq Q4HR Henrry Ying M.D. 25 mL/hr at 08/08/18 0553 40 mEq at 08/08/18 0553   • Respiratory Care per Protocol   Continuous RT Henrry Ying M.D.       • senna-docusate (PERICOLACE or SENOKOT S) 8.6-50 MG per tablet 2 Tab  2 Tab BID Henrry Ying M.D.   Stopped at 08/08/18 0600    And   • polyethylene glycol/lytes (MIRALAX) PACKET 1 Packet  1 Packet QDAY PRN Henrry Ying M.D.        And   • magnesium hydroxide (MILK OF MAGNESIA) suspension 30 mL  30 mL QDAY PRN Henrry Ying M.D.        And   • bisacodyl (DULCOLAX) suppository 10 mg  10 mg QDAY PRN Henrry Ying M.D.       • MD ALERT...Adult ICU Electrolyte Replacement per Pharmacy Protocol   pharmacy to dose Henrry Ying M.D.       • fentaNYL (SUBLIMAZE) injection 25 mcg  25 mcg Q HOUR PRN Henrry Ying M.D.        Or   • fentaNYL (SUBLIMAZE) injection 50 mcg  50 mcg Q HOUR PRN Henrry Ying M.D.        Or   • fentaNYL (SUBLIMAZE) injection 100 mcg  100 mcg Q HOUR PRN Henrry Ying M.D.   100 mcg at 08/08/18 0426   • ipratropium-albuterol (DUONEB) nebulizer solution  3 mL Q2HRS PRN (RT) Henrry Ying M.D.       • ipratropium-albuterol (DUONEB) nebulizer solution  3 mL Q4HRS (RT) Henrry Ying M.D.   3 mL at 08/08/18 0627   • famotidine (PEPCID) tablet 20 mg  20 mg Q12HRS Henrry Ying M.D.        Or   • famotidine (PEPCID) injection 20 mg  20 mg Q12HRS Henrry Ying M.D.   20 mg at 08/08/18 0532   • insulin  regular (HUMULIN R) injection 2-9 Units  2-9 Units Q6HRS Henrry Ying M.D.        And   • glucose 4 g chewable tablet 16 g  16 g Q15 MIN PRN Henrry Ying M.D.        And   • dextrose 50% (D50W) injection 25 mL  25 mL Q15 MIN PRN Henrry Ying M.D.         Last reviewed on 8/7/2018  9:59 PM by Keren Patricio R.N.    Quality  Measures:  Labs reviewed, Medications reviewed and Radiology images reviewed  Raygoza catheter: No Raygoza  Central line in place: Sepsis    DVT Prophylaxis: Enoxaparin (Lovenox)  DVT prophylaxis - mechanical: SCDs  Ulcer prophylaxis: Yes  Antibiotics: Treating active infection/contamination beyond 24 hours perioperative coverage      Impression and plan:    1. Acute severe hypoxemic respiratory failure    -Patient has evidence of infiltrates and gram-positive cocci on BAL data so far  -Patient has recent history of pneumonia  -Mental status change possibly consistent with other infection including aseptic meningitis  -Continue her ventilator bundle  -Adjust the PEEP slightly higher to better aerate right lung though there may be a component of chronic right hemidiaphragm elevation  -Obesity with obstructive sleep apnea as noted. Patient noncompliant.    2. Encephalopathy.    -Patient's LP data is notable for WBC with predominance of polys, however patient did have normal glucose and slightly elevated protein.  -There is also element of increased lymphocytes  -No organisms to date  -Continue meningeal dose treatment and add steroids  -Assess for infectious disease to comment    3. A. fib with rapid ventricular rate    -Continue with amiodarone infusion for now  -Would not convert to oral therapy until patient receives appropriate loading dose  -She is at high risk for developing repeated atrial fibrillation with rapid rate    4. Probable aspiration event    -The patient's depressed mental status, she has been started on aspiration coverage    5. Hypertensive heart  disease    -Follow-up echocardiogram reveals grade 2 diastolic dysfunction  -Watch for significant fluid overload    6. Mild rhabdomyolysis    -Follow CPK and continue with fluid resuscitation    7. Wounds to the right shoulder and posterior legs possibly related to down time.    -Follow-up with wound care as needed.    Discussed patient condition and risk of morbidity and/or mortality with MD rounds  The patient remains critically ill.  Critical care time = 50 minutes in directly providing and coordinating critical care and extensive data review.  No time overlap and excludes procedures.    Remi Martinez D.O.  Critical Care Medicine

## 2018-08-08 NOTE — CARE PLAN
Problem: Ventilation Defect:  Goal: Ability to achieve and maintain unassisted ventilation or tolerate decreased levels of ventilator support    Intervention: Support and monitor invasive and noninvasive mechanical ventilation  Adult Ventilation Update    Total Vent Days: ***    Patient Lines/Drains/Airways Status    Active Airway     Name: Placement date: Placement time: Site: Days:    Airway Group ET Tube Oral 7.5 08/07/18      Oral   1                           Plateau Pressure (Q Shift): 15 (08/08/18 0400)  Static Compliance (ml / cm H2O): 56 (08/08/18 1406)    Patient failed trials because of    Barriers to SBT    Length of Weaning Trial             ASV Inspiratory Pressures  % Spontaneous ***    Sputum/Suction ***  Cough: Moist;Productive (08/08/18 1406)  Sputum Amount: Small (08/08/18 1406)  Sputum Color: Clear;White (08/08/18 1406)  Sputum Consistency: Thick;Thin (08/08/18 1406)    Mobility  Level of Mobility: Level I (08/08/18 1200)  Activity Performed: Unable to mobilize (08/08/18 1200)  Reason Not Mobilized: Unstable condition (08/08/18 1200)  Mobilization Comments: Recently intubated (08/08/18 0400)    Events/Summary/Plan: pt stable (08/08/18 1406)

## 2018-08-08 NOTE — H&P
Internal Medicine Admitting History and Physical    Note Author: Liborio Johnson M.D.       Name Salome Quinones       1960   Age/Sex 58 y.o. female   MRN [id number]   Code Status Full     After 5PM or if no immediate response to page, please call for cross-coverage  Attending/Team: Aguila Li See Patient List for primary contact information  Call [phone] to page    1st Call - Day Intern (R1):   RUBIA 2nd Call - Day Sr. Resident (R2/R3):   JOEL Cardoso       Chief Complaint:  Unresponsiveness    HPI:  Mrs. Quinones is a 57 yo female with a history of hypertension and lower extremity ulcers and cellulitis that presented for the above.  Per EMS, she was last seen acting normally 2 nights prior.  Her  checked on her the day of presentation and she was found down on the ground.  He tried to arouse her, but she was unresponsive.  When the EMS arrived on scene, T 104 deg F, /100, BGl 110.  The patient had trismus and a respiration rate of 40 bbm.  She was subsequently intubated and given 2 L of IVF, ketamine, rocuronium, fentanyl, versed.    On admission, T 38.8 C, , RR 24, /117, 134 kg, sat 95% on ventilator.  WBC 16.3 (73.6% neuts), Hgb 17.2.  K 3.1, bicarb 19, AG 8.0, BUN 28, Cr 1.29, Ca 5.5 (correcte to 6.46), CPK 2390, Trop 0.12.  ABG, ph 7.25, PCO2 50.4, Po2, 82, BC 22.1.  U/A nitrite positive, LE small, WBC 5-10.  LP with 36 WBC and 88 protein.  CXR showed lenora lung atelectasis.  CT head showed no intracranial abnormality.  CT spine showed no fratures or subluxations.  CT chest showed no indication of pulmonary embolism, prominent pulmonary vessels suggesting pulmonary hypertension.  EKG showed , , sinus tachycardia, left ventricular hypertrophy.  ED gave ceftriaxone, metronidazole, normal saline bolus, fentanyl, zofran, calcium gluconate.    Patient admitted to ICU for management.    Review of Systems   Unable to perform ROS: Patient unresponsive     Past  "Medical History:   Past Medical History:   Diagnosis Date   • Hernia    • History of chicken pox    • Hypertension    • Sleep apnea     non-compliant with CPAP   • Tobacco use disorder     28 py smoking history       Past Surgical History:  Past Surgical History:   Procedure Laterality Date   • HYSTERECTOMY RADICAL  4/2000   • HERNIA REPAIR  1963       Current Outpatient Medications:  Home Medications     Reviewed by Keren Patricio R.N. (Registered Nurse) on 08/07/18 at 2159  Med List Status: Partial   Medication Last Dose Status   carvedilol (COREG) 6.25 MG Tab  Active   gabapentin (NEURONTIN) 300 MG Cap not taking Active   hydrALAZINE (APRESOLINE) 25 MG Tab  Active   losartan (COZAAR) 50 MG Tab  Active   Naproxen Sodium (ALEVE PO) 4/29/2017 Active                Medication Allergy/Sensitivities:  No Known Allergies      Family History:  Family History   Problem Relation Age of Onset   • Other Mother         adopted       Social History:  Social History     Social History   • Marital status:      Spouse name: N/A   • Number of children: N/A   • Years of education: N/A     Occupational History   • Not on file.     Social History Main Topics   • Smoking status: Current Some Day Smoker     Packs/day: 1.00     Years: 28.00     Types: Cigarettes     Last attempt to quit: 4/29/2017   • Smokeless tobacco: Never Used   • Alcohol use Yes      Comment: \"occasionally\"   • Drug use: No   • Sexual activity: Not on file     Other Topics Concern   • Not on file     Social History Narrative   • No narrative on file     Living situation: [facility] with   PCP : KARYNA Shea      Physical Exam     Vitals:    08/08/18 0120 08/08/18 0135 08/08/18 0145 08/08/18 0155   Pulse:  100     Resp:  18     Temp:       SpO2: 98% 94% 96% 96%   Weight:       Height:         Body mass index is 41.2 kg/m².  Pulse 100   Temp (!) 38.8 °C (101.8 °F)   Resp 18   Ht 1.803 m (5' 11\")   Wt (!) 134 kg (295 lb 6.7 oz)   SpO2 96%   " BMI 41.20 kg/m²   O2 therapy: Pulse Oximetry: 96 %, O2 Delivery: Ventilator    Physical Exam   Constitutional:   Sedated   HENT:   Head: Normocephalic and atraumatic.   Eyes: Pupils are equal, round, and reactive to light. Right eye exhibits no discharge. Left eye exhibits no discharge.   Neck: No tracheal deviation present.   Cardiovascular: Normal rate, regular rhythm and normal heart sounds.    Pulmonary/Chest: Effort normal and breath sounds normal. She has no wheezes. She has no rales.   Abdominal: Soft. Bowel sounds are normal. She exhibits no distension.   Musculoskeletal:   Left shoulder:  Skin break down  Lower extremity:  hyper keratination     Lymphadenopathy:     She has no cervical adenopathy.   Neurological:   Sedated, unresponsive   Skin: Skin is warm and dry.         Data Review       Old Records Request:   Completed  Current Records review/summary: Completed    Lab Data Review:  Recent Results (from the past 24 hour(s))   EKG (NOW)    Collection Time: 18  8:28 PM   Result Value Ref Range    Report       [facility]    Test Date:  2018  Pt Name:    LIBBY RIGGINS              Department: ER  MRN:        [id number]                      Room:       [facility]  Gender:     Female                       Technician: [id number]  :        1960                   Requested By:ER TRIAGE PROTOCOL  Order #:    [id number]                    Reading MD:    Measurements  Intervals                                Axis  Rate:       111                          P:          69  DE:         156                          QRS:        101  QRSD:       102                          T:          65  QT:         372  QTc:        506    Interpretive Statements  SINUS TACHYCARDIA  RIGHT AXIS DEVIATION  LATE PRECORDIAL R/S TRANSITION  CONSIDER LEFT VENTRICULAR HYPERTROPHY  BORDERLINE PROLONGED QT INTERVAL  No previous ECG available for comparison     HCG QUAL SERUM    Collection Time:  08/07/18  8:34 PM   Result Value Ref Range    Beta-Hcg Qualitative Serum Negative Negative   CBC WITH DIFFERENTIAL    Collection Time: 08/07/18  8:34 PM   Result Value Ref Range    WBC 16.3 (H) 4.8 - 10.8 K/uL    RBC 5.82 (H) 4.20 - 5.40 M/uL    Hemoglobin 17.2 (H) 12.0 - 16.0 g/dL    Hematocrit 53.3 (H) 37.0 - 47.0 %    MCV 91.6 81.4 - 97.8 fL    MCH 29.6 27.0 - 33.0 pg    MCHC 32.3 (L) 33.6 - 35.0 g/dL    RDW 52.0 (H) 35.9 - 50.0 fL    Platelet Count 254 164 - 446 K/uL    MPV 10.2 9.0 - 12.9 fL    Neutrophils-Polys 73.60 (H) 44.00 - 72.00 %    Lymphocytes 12.10 (L) 22.00 - 41.00 %    Monocytes 13.40 0.00 - 13.40 %    Eosinophils 0.00 0.00 - 6.90 %    Basophils 0.30 0.00 - 1.80 %    Immature Granulocytes 0.60 0.00 - 0.90 %    Nucleated RBC 0.00 /100 WBC    Neutrophils (Absolute) 12.02 (H) 2.00 - 7.15 K/uL    Lymphs (Absolute) 1.97 1.00 - 4.80 K/uL    Monos (Absolute) 2.18 (H) 0.00 - 0.85 K/uL    Eos (Absolute) 0.00 0.00 - 0.51 K/uL    Baso (Absolute) 0.05 0.00 - 0.12 K/uL    Immature Granulocytes (abs) 0.10 0.00 - 0.11 K/uL    NRBC (Absolute) 0.00 K/uL   LACTIC ACID    Collection Time: 08/07/18  8:34 PM   Result Value Ref Range    Lactic Acid 2.0 0.5 - 2.0 mmol/L   TROPONIN    Collection Time: 08/07/18  8:34 PM   Result Value Ref Range    Troponin I 0.12 (H) 0.00 - 0.04 ng/mL   URINALYSIS    Collection Time: 08/07/18  9:17 PM   Result Value Ref Range    Color DK Yellow     Character Turbid (A)     Specific Gravity 1.027 <1.035    Ph 5.0 5.0 - 8.0    Glucose Negative Negative mg/dL    Ketones Trace (A) Negative mg/dL    Protein 300 (A) Negative mg/dL    Bilirubin Moderate (A) Negative    Urobilinogen, Urine 1.0 Negative    Nitrite Positive (A) Negative    Leukocyte Esterase Small (A) Negative    Occult Blood Large (A) Negative    Micro Urine Req Microscopic    URINE MICROSCOPIC (W/UA)    Collection Time: 08/07/18  9:17 PM   Result Value Ref Range    WBC 5-10 (A) /hpf    RBC 5-10 (A) /hpf    Bacteria Negative None  /hpf    Epithelial Cells Many (A) /hpf   COMP METABOLIC PANEL    Collection Time: 08/07/18  9:30 PM   Result Value Ref Range    Sodium 144 135 - 145 mmol/L    Potassium 3.1 (L) 3.6 - 5.5 mmol/L    Chloride 117 (H) 96 - 112 mmol/L    Co2 19 (L) 20 - 33 mmol/L    Anion Gap 8.0 0.0 - 11.9    Glucose 121 (H) 65 - 99 mg/dL    Bun 28 (H) 8 - 22 mg/dL    Creatinine 1.29 0.50 - 1.40 mg/dL    Calcium 5.5 (LL) 8.5 - 10.5 mg/dL    AST(SGOT) 61 (H) 12 - 45 U/L    ALT(SGPT) 33 2 - 50 U/L    Alkaline Phosphatase 68 30 - 99 U/L    Total Bilirubin 0.9 0.1 - 1.5 mg/dL    Albumin 2.8 (L) 3.2 - 4.9 g/dL    Total Protein 5.1 (L) 6.0 - 8.2 g/dL    Globulin 2.3 1.9 - 3.5 g/dL    A-G Ratio 1.2 g/dL   MAGNESIUM    Collection Time: 08/07/18  9:30 PM   Result Value Ref Range    Magnesium 1.5 1.5 - 2.5 mg/dL   PHOSPHORUS    Collection Time: 08/07/18  9:30 PM   Result Value Ref Range    Phosphorus 3.4 2.5 - 4.5 mg/dL   CREATINE KINASE    Collection Time: 08/07/18  9:30 PM   Result Value Ref Range    CPK Total 2390 (HH) 0 - 154 U/L   TRIGLYCERIDE    Collection Time: 08/07/18  9:30 PM   Result Value Ref Range    Triglycerides 90 0 - 149 mg/dL   SALICYLATE    Collection Time: 08/07/18  9:30 PM   Result Value Ref Range    Salicylates, Quant. 0 (L) 15 - 25 mg/dL   DIAGNOSTIC ALCOHOL    Collection Time: 08/07/18  9:30 PM   Result Value Ref Range    Diagnostic Alcohol 0.00 0.00 g/dL   ACETAMINOPHEN    Collection Time: 08/07/18  9:30 PM   Result Value Ref Range    Acetaminophen -Tylenol <10 10 - 30 ug/mL   ESTIMATED GFR    Collection Time: 08/07/18  9:30 PM   Result Value Ref Range    GFR If  52 (A) >60 mL/min/1.73 m 2    GFR If Non  43 (A) >60 mL/min/1.73 m 2   ISTAT ARTERIAL BLOOD GAS    Collection Time: 08/07/18 10:07 PM   Result Value Ref Range    Ph 7.250 (LL) 7.400 - 7.500    Pco2 50.4 (H) 26.0 - 37.0 mmHg    Po2 82 64 - 87 mmHg    Tco2 24 20 - 33 mmol/L    S02 94 93 - 99 %    Hco3 22.1 17.0 - 25.0 mmol/L    BE -6  (L) -4 - 3 mmol/L    Body Temp 39.3 C degrees    O2 Therapy 60 %    Ph Temp Sarthak 7.219 (LL) 7.400 - 7.500    Pco2 Temp Co 55.7 (HH) 26.0 - 37.0 mmHg    Po2 Temp Cor 95 (H) 64 - 87 mmHg    Specimen Arterial     Action Range Triggered YES     Inst. Qualified Patient YES    LACTIC ACID    Collection Time: 08/07/18 11:10 PM   Result Value Ref Range    Lactic Acid 1.8 0.5 - 2.0 mmol/L       Imaging/Procedures Review:    Independant Imaging Review: Completed  CT-CTA CHEST PULMONARY ARTERY W/ RECONS   Final Result      1.  No filling defects within the pulmonary arteries to indicate emboli.   2.  Prominent central pulmonary vessels suggesting pulmonary hypertension.   3.  RIGHT mainstem intubation with LEFT lower lobe atelectasis.      These findings were discussed with EUSEBIO SILVERIO on 8/8/2018 12:52 AM.         CT-CSPINE WITHOUT PLUS RECONS   Final Result      1.  Moderate to severe multilevel degenerative change of cervical spine.   2.  No acute fracture or subluxation.      CT-HEAD W/O   Final Result      1.  No acute intracranial abnormality.   2.  LEFT parietal scalp swelling.   3.  Chronic paranasal sinus disease.      DX-CHEST-PORTABLE (1 VIEW)   Final Result      1.  Supportive tubing as described above.   2.  Hypoinflation with elevated RIGHT hemidiaphragm.   3.  LEFT lung base atelectasis.      DX-CHEST-PORTABLE (1 VIEW)    (Results Pending)   ECHOCARDIOGRAM COMP W/O CONT    (Results Pending)            EKG:   EKG Independant Review: Completed  QTc:506, HR: 111, Normal Sinus Rhythm, no ST/T changes    Records reviewed and summarized in current documentation :  Yes  [employer] teaching service handout given to patient:  No             Assessment/Plan     * Severe sepsis (HCC)   Assessment & Plan    SIRS 4/4.  QSOFA 2.    Patient found down with AMS and tachypnia.  Unclear source. CSF unsupportive of bacterial menengitis.  U/A supportive of UTI.  CXR showed left lung base atelectasis.  Possible aspiration pneumonia.  -Admit  to ICU  -Pending CSF gram stain and culture  -Acyclovir, ampicillin, ceftriaxone, vancomycin  -Iv fluids  -Pending BAL cultures, bronchoscopy performed morning after admission showing bilateral yellow secretions.  -Pending blood cultures        Encephalopathy acute   Assessment & Plan    Due to sever sepsis  -(see treatment for sepsis)        Aspiration pneumonia (HCC)   Assessment & Plan    Possible source of sepsis given yellow secretions on bronchoscopy.  -(see treatment for sepsis)        Acute hypoxemic respiratory failure (HCC)   Assessment & Plan    Intubated by EMS due to tachypnea.    -Vent management  -Duonebs        Hyperglycemia   Assessment & Plan    No known history of diabetes.  A1c 4/30/2017 5.5  -insulin sliding scale        Atelectasis   Assessment & Plan    Documented on CXR  -CTM        Abnormal urinalysis   Assessment & Plan    U/A nitrite positive, LE small, WBC 5-10.  -ABX for sepsis will provide overlaping coverage        Elevated troponin   Assessment & Plan    On admission trop 0.12.  EKG no st/t changes  -CTM        Rhabdomyolysis   Assessment & Plan    Patient found down. Admiting CPK 2309.    - NS @ 125  -CTM        Hypocalcemia   Assessment & Plan    -CTM and replete          Pulmonary hypertension (HCC)   Assessment & Plan    CT showed prominent pulmonary vessels suggesting pulmonary hypertension.   -TTE in the morning        Essential hypertension- (present on admission)   Assessment & Plan    At home on hydralazine, carvedilol, losartain  -Holding home antihypertensives for the time being.            Anticipated Hospital stay:  >2 midnights        Quality Measures  Quality-Core Measures   Reviewed items::  EKG reviewed, Labs reviewed, Medications reviewed and Radiology images reviewed  DVT prophylaxis pharmacological::  Enoxaparin (Lovenox)  DVT prophylaxis - mechanical:  SCDs    PCP: Pcp Pt States None

## 2018-08-08 NOTE — ASSESSMENT & PLAN NOTE
New onset this admission in the context of sepsis, [id number]  2. Patient converted to NSR. Patient was trreated with Amiodarone, this was D/C  Given her [id number] score, Xarelto anticoagulation was discussed with patient however she has no insurance and was unable to afford this. Coumadin with anticoagulation clinic follow up was also discussed, but this was too expensive.  Plan  Discharged patient with instructions to follow up with PCP regarding 3 months of anticoagulation for her atrial fibrillation

## 2018-08-08 NOTE — WOUND TEAM
Wound team note:   Pt seen for placement of BMS. MD order verified. Pt assessed, noted to be incontinent of loose brown stool. Sacrum/buttocks pink and intact. Sphincter tone determined to be adequate. BMS placed without difficulty, 35 mL of tap water placed in retention balloon, irrigated with 60 mL warm tap water. Nursing to irrigate q shift with 60 mL-120 mL warm tap water or until patent.

## 2018-08-08 NOTE — PROCEDURES
Date of service:  8/8/2018    Title:  Central venous catheter placement - internal jugular vein    Indication:  Pneumonia.  Poor peripheral venous access.  Needs central venous access for medication administration.    Narrative:    The right neck was prepped with chlorhexidine and draped in the usual sterile fashion.  1% Xylocaine solution was used for topical anesthesia.  A triple lumen central venous catheter was placed into the right internal jugular vein under ultrasound guidance using the technique described by Farhad without difficulty or apparent complication.  The line was sutured into place and a sterile dressing was placed over the line.  All 3 ports flush and return venous blood easily.  The patient tolerated the procedure quite nicely.  No complications are apparent.  A STAT CXR is ordered to confirm placement.      Henrry Ying MD  Pulmonary and Critical Care Medicine

## 2018-08-08 NOTE — ASSESSMENT & PLAN NOTE
Severe sepsis secondary to pneumococcal pneumonia, which lead to AMS. Patient treated with 7days of rocephin. Oxygen weaned off patient successfully and sepsis resolved.

## 2018-08-08 NOTE — ASSESSMENT & PLAN NOTE
Pneumococcal Pneumonia, completed 7 day course of Ceftriaxone. ID following. Patients mentation improving, more alert compared to yesterady

## 2018-08-08 NOTE — PROCEDURES
INDICATION: Sepsis with unresponsiveness     PROCEDURE : HENRY Tsai     ATTENDING PHYSICIAN: JOEL Garcia  In Attendance (Y/N): Yes     CONSENT:   Unable to obtain due to patient condition     PROCEDURE SUMMARY:   A time-out was performed. My hands were washed immediately prior to the procedure. I wore a surgical cap, mask with protective eyewear, sterile gown and sterile gloves throughout the procedure. The patient was placed in the left lateral deucbitus position with help from the nursing staff. The area was cleansed and draped in usual sterile fashion using betadine scrub. Anesthesia was achieved with 1% lidocaine. A 20-gauge 3.5-inch spinal needle was placed in the L4-5 lumbar interspace. On the second attempt, clear cerebral spinal fluid was obtained. The opening pressure was not measured. 3.0 cc of CSF was collected into 4 tubes . These were sent for the usual tests. A sterile bandaid was placed over the puncture site. The patient had no immediate complications and tolerated the procedure well.  Estimated blood loss was < 1cc.

## 2018-08-08 NOTE — ED NOTES
This RN at BS with MONICA hawthorne and Dr. Joyce for lumbar puncture. Pt repositioned. Clear CSF obtained. Pt had BM, this is the third episode of liquid stool. Full linen and gown change provided. Pt now resting comfortably. NAD.

## 2018-08-08 NOTE — PROGRESS NOTES
12 hour cc    Monitor Summary: Pt initally SR-ST 18/08/40    Converted to Afib RVR @ 0510 Rate : 150-190

## 2018-08-09 ENCOUNTER — APPOINTMENT (OUTPATIENT)
Dept: RADIOLOGY | Facility: MEDICAL CENTER | Age: 58
DRG: 853 | End: 2018-08-09
Attending: INTERNAL MEDICINE

## 2018-08-09 ENCOUNTER — APPOINTMENT (OUTPATIENT)
Dept: RADIOLOGY | Facility: MEDICAL CENTER | Age: 58
DRG: 853 | End: 2018-08-09
Attending: STUDENT IN AN ORGANIZED HEALTH CARE EDUCATION/TRAINING PROGRAM

## 2018-08-09 LAB
ACTION RANGE TRIGGERED IACRT: NO
ANION GAP SERPL CALC-SCNC: 7 MMOL/L (ref 0–11.9)
BASE EXCESS BLDA CALC-SCNC: -1 MMOL/L (ref -4–3)
BASOPHILS # BLD AUTO: 0.1 % (ref 0–1.8)
BASOPHILS # BLD: 0.01 K/UL (ref 0–0.12)
BODY TEMPERATURE: NORMAL DEGREES
BUN SERPL-MCNC: 27 MG/DL (ref 8–22)
CA-I SERPL-SCNC: 1.1 MMOL/L (ref 1.1–1.3)
CALCIUM SERPL-MCNC: 8 MG/DL (ref 8.5–10.5)
CHLORIDE SERPL-SCNC: 109 MMOL/L (ref 96–112)
CK SERPL-CCNC: 2390 U/L (ref 0–154)
CO2 BLDA-SCNC: 24 MMOL/L (ref 20–33)
CO2 SERPL-SCNC: 22 MMOL/L (ref 20–33)
CREAT SERPL-MCNC: 0.88 MG/DL (ref 0.5–1.4)
EOSINOPHIL # BLD AUTO: 0 K/UL (ref 0–0.51)
EOSINOPHIL NFR BLD: 0 % (ref 0–6.9)
ERYTHROCYTE [DISTWIDTH] IN BLOOD BY AUTOMATED COUNT: 50.6 FL (ref 35.9–50)
GLUCOSE BLD-MCNC: 107 MG/DL (ref 65–99)
GLUCOSE BLD-MCNC: 109 MG/DL (ref 65–99)
GLUCOSE BLD-MCNC: 117 MG/DL (ref 65–99)
GLUCOSE BLD-MCNC: 129 MG/DL (ref 65–99)
GLUCOSE BLD-MCNC: 85 MG/DL (ref 65–99)
GLUCOSE SERPL-MCNC: 136 MG/DL (ref 65–99)
HCO3 BLDA-SCNC: 22.5 MMOL/L (ref 17–25)
HCT VFR BLD AUTO: 41.3 % (ref 37–47)
HGB BLD-MCNC: 13.5 G/DL (ref 12–16)
IMM GRANULOCYTES # BLD AUTO: 0.03 K/UL (ref 0–0.11)
IMM GRANULOCYTES NFR BLD AUTO: 0.3 % (ref 0–0.9)
INST. QUALIFIED PATIENT IIQPT: YES
LYMPHOCYTES # BLD AUTO: 1.02 K/UL (ref 1–4.8)
LYMPHOCYTES NFR BLD: 10.3 % (ref 22–41)
MAGNESIUM SERPL-MCNC: 2.2 MG/DL (ref 1.5–2.5)
MCH RBC QN AUTO: 29.7 PG (ref 27–33)
MCHC RBC AUTO-ENTMCNC: 32.7 G/DL (ref 33.6–35)
MCV RBC AUTO: 90.8 FL (ref 81.4–97.8)
MONOCYTES # BLD AUTO: 0.69 K/UL (ref 0–0.85)
MONOCYTES NFR BLD AUTO: 7 % (ref 0–13.4)
NEUTROPHILS # BLD AUTO: 8.16 K/UL (ref 2–7.15)
NEUTROPHILS NFR BLD: 82.3 % (ref 44–72)
NRBC # BLD AUTO: 0 K/UL
NRBC BLD-RTO: 0 /100 WBC
O2/TOTAL GAS SETTING VFR VENT: 40 %
PCO2 BLDA: 34.3 MMHG (ref 26–37)
PCO2 TEMP ADJ BLDA: 33.1 MMHG (ref 26–37)
PH BLDA: 7.43 [PH] (ref 7.4–7.5)
PH TEMP ADJ BLDA: 7.44 [PH] (ref 7.4–7.5)
PHOSPHATE SERPL-MCNC: 2.9 MG/DL (ref 2.5–4.5)
PLATELET # BLD AUTO: 158 K/UL (ref 164–446)
PMV BLD AUTO: 10.6 FL (ref 9–12.9)
PO2 BLDA: 85 MMHG (ref 64–87)
PO2 TEMP ADJ BLDA: 81 MMHG (ref 64–87)
POTASSIUM SERPL-SCNC: 4.4 MMOL/L (ref 3.6–5.5)
RBC # BLD AUTO: 4.55 M/UL (ref 4.2–5.4)
SAO2 % BLDA: 97 % (ref 93–99)
SODIUM SERPL-SCNC: 138 MMOL/L (ref 135–145)
SPECIMEN DRAWN FROM PATIENT: NORMAL
TRIGL SERPL-MCNC: 148 MG/DL (ref 0–149)
TROPONIN I SERPL-MCNC: 0.15 NG/ML (ref 0–0.04)
WBC # BLD AUTO: 9.9 K/UL (ref 4.8–10.8)

## 2018-08-09 PROCEDURE — 84100 ASSAY OF PHOSPHORUS: CPT

## 2018-08-09 PROCEDURE — 71045 X-RAY EXAM CHEST 1 VIEW: CPT

## 2018-08-09 PROCEDURE — 700102 HCHG RX REV CODE 250 W/ 637 OVERRIDE(OP): Performed by: INTERNAL MEDICINE

## 2018-08-09 PROCEDURE — 82803 BLOOD GASES ANY COMBINATION: CPT

## 2018-08-09 PROCEDURE — 82550 ASSAY OF CK (CPK): CPT

## 2018-08-09 PROCEDURE — 700111 HCHG RX REV CODE 636 W/ 250 OVERRIDE (IP): Performed by: INTERNAL MEDICINE

## 2018-08-09 PROCEDURE — 82962 GLUCOSE BLOOD TEST: CPT | Mod: 91

## 2018-08-09 PROCEDURE — 83735 ASSAY OF MAGNESIUM: CPT

## 2018-08-09 PROCEDURE — 74018 RADEX ABDOMEN 1 VIEW: CPT

## 2018-08-09 PROCEDURE — 94150 VITAL CAPACITY TEST: CPT

## 2018-08-09 PROCEDURE — 700105 HCHG RX REV CODE 258

## 2018-08-09 PROCEDURE — 700101 HCHG RX REV CODE 250: Performed by: INTERNAL MEDICINE

## 2018-08-09 PROCEDURE — A9270 NON-COVERED ITEM OR SERVICE: HCPCS | Performed by: INTERNAL MEDICINE

## 2018-08-09 PROCEDURE — 700105 HCHG RX REV CODE 258: Performed by: INTERNAL MEDICINE

## 2018-08-09 PROCEDURE — 84478 ASSAY OF TRIGLYCERIDES: CPT

## 2018-08-09 PROCEDURE — 85025 COMPLETE CBC W/AUTO DIFF WBC: CPT

## 2018-08-09 PROCEDURE — 84484 ASSAY OF TROPONIN QUANT: CPT

## 2018-08-09 PROCEDURE — 94003 VENT MGMT INPAT SUBQ DAY: CPT

## 2018-08-09 PROCEDURE — 770022 HCHG ROOM/CARE - ICU (200)

## 2018-08-09 PROCEDURE — 36600 WITHDRAWAL OF ARTERIAL BLOOD: CPT

## 2018-08-09 PROCEDURE — 82330 ASSAY OF CALCIUM: CPT

## 2018-08-09 PROCEDURE — B4081 ENTERAL NG TUBING W/ STYLET: HCPCS | Performed by: STUDENT IN AN ORGANIZED HEALTH CARE EDUCATION/TRAINING PROGRAM

## 2018-08-09 PROCEDURE — 700111 HCHG RX REV CODE 636 W/ 250 OVERRIDE (IP): Performed by: STUDENT IN AN ORGANIZED HEALTH CARE EDUCATION/TRAINING PROGRAM

## 2018-08-09 PROCEDURE — 94640 AIRWAY INHALATION TREATMENT: CPT

## 2018-08-09 PROCEDURE — 99291 CRITICAL CARE FIRST HOUR: CPT | Performed by: INTERNAL MEDICINE

## 2018-08-09 PROCEDURE — 80048 BASIC METABOLIC PNL TOTAL CA: CPT

## 2018-08-09 RX ORDER — AMIODARONE HYDROCHLORIDE 200 MG/1
400 TABLET ORAL TWICE DAILY
Status: DISCONTINUED | OUTPATIENT
Start: 2018-08-10 | End: 2018-08-11

## 2018-08-09 RX ORDER — AMIODARONE HYDROCHLORIDE 200 MG/1
400 TABLET ORAL TWICE DAILY
Status: DISCONTINUED | OUTPATIENT
Start: 2018-08-09 | End: 2018-08-09

## 2018-08-09 RX ORDER — SODIUM CHLORIDE, SODIUM LACTATE, POTASSIUM CHLORIDE, CALCIUM CHLORIDE 600; 310; 30; 20 MG/100ML; MG/100ML; MG/100ML; MG/100ML
INJECTION, SOLUTION INTRAVENOUS
Status: COMPLETED
Start: 2018-08-09 | End: 2018-08-09

## 2018-08-09 RX ORDER — FAMOTIDINE 20 MG/1
20 TABLET, FILM COATED ORAL EVERY 12 HOURS
Status: DISCONTINUED | OUTPATIENT
Start: 2018-08-10 | End: 2018-08-12

## 2018-08-09 RX ADMIN — FENTANYL CITRATE 100 MCG: 50 INJECTION, SOLUTION INTRAMUSCULAR; INTRAVENOUS at 19:11

## 2018-08-09 RX ADMIN — ACYCLOVIR SODIUM 700 MG: 500 INJECTION, SOLUTION INTRAVENOUS at 05:56

## 2018-08-09 RX ADMIN — IPRATROPIUM BROMIDE AND ALBUTEROL SULFATE 3 ML: .5; 3 SOLUTION RESPIRATORY (INHALATION) at 09:16

## 2018-08-09 RX ADMIN — IPRATROPIUM BROMIDE AND ALBUTEROL SULFATE 3 ML: .5; 3 SOLUTION RESPIRATORY (INHALATION) at 14:49

## 2018-08-09 RX ADMIN — IPRATROPIUM BROMIDE AND ALBUTEROL SULFATE 3 ML: .5; 3 SOLUTION RESPIRATORY (INHALATION) at 18:28

## 2018-08-09 RX ADMIN — IPRATROPIUM BROMIDE AND ALBUTEROL SULFATE 3 ML: .5; 3 SOLUTION RESPIRATORY (INHALATION) at 22:10

## 2018-08-09 RX ADMIN — AMIODARONE HYDROCHLORIDE 0.5 MG/MIN: 50 INJECTION, SOLUTION INTRAVENOUS at 00:11

## 2018-08-09 RX ADMIN — FENTANYL CITRATE 100 MCG: 50 INJECTION, SOLUTION INTRAMUSCULAR; INTRAVENOUS at 01:40

## 2018-08-09 RX ADMIN — PROPOFOL 40 MCG/KG/MIN: 10 INJECTION, EMULSION INTRAVENOUS at 14:15

## 2018-08-09 RX ADMIN — PROPOFOL 30 MCG/KG/MIN: 10 INJECTION, EMULSION INTRAVENOUS at 05:20

## 2018-08-09 RX ADMIN — FAMOTIDINE 20 MG: 10 INJECTION, SOLUTION INTRAVENOUS at 05:03

## 2018-08-09 RX ADMIN — SODIUM CHLORIDE, POTASSIUM CHLORIDE, SODIUM LACTATE AND CALCIUM CHLORIDE: 600; 310; 30; 20 INJECTION, SOLUTION INTRAVENOUS at 16:40

## 2018-08-09 RX ADMIN — ENOXAPARIN SODIUM 40 MG: 100 INJECTION SUBCUTANEOUS at 16:42

## 2018-08-09 RX ADMIN — PROPOFOL 50 MCG/KG/MIN: 10 INJECTION, EMULSION INTRAVENOUS at 20:50

## 2018-08-09 RX ADMIN — IPRATROPIUM BROMIDE AND ALBUTEROL SULFATE 3 ML: .5; 3 SOLUTION RESPIRATORY (INHALATION) at 07:16

## 2018-08-09 RX ADMIN — PROPOFOL 30 MCG/KG/MIN: 10 INJECTION, EMULSION INTRAVENOUS at 01:54

## 2018-08-09 RX ADMIN — SODIUM CHLORIDE, POTASSIUM CHLORIDE, SODIUM LACTATE AND CALCIUM CHLORIDE: 600; 310; 30; 20 INJECTION, SOLUTION INTRAVENOUS at 16:45

## 2018-08-09 RX ADMIN — AMIODARONE HYDROCHLORIDE 400 MG: 200 TABLET ORAL at 14:15

## 2018-08-09 RX ADMIN — PROPOFOL 50 MCG/KG/MIN: 10 INJECTION, EMULSION INTRAVENOUS at 23:00

## 2018-08-09 RX ADMIN — PROPOFOL 35 MCG/KG/MIN: 10 INJECTION, EMULSION INTRAVENOUS at 11:19

## 2018-08-09 RX ADMIN — CEFTRIAXONE 2 G: 2 INJECTION, POWDER, FOR SOLUTION INTRAMUSCULAR; INTRAVENOUS at 16:43

## 2018-08-09 RX ADMIN — ENOXAPARIN SODIUM 40 MG: 100 INJECTION SUBCUTANEOUS at 05:03

## 2018-08-09 RX ADMIN — PROPOFOL 40 MCG/KG/MIN: 10 INJECTION, EMULSION INTRAVENOUS at 16:40

## 2018-08-09 RX ADMIN — PROPOFOL 30 MCG/KG/MIN: 10 INJECTION, EMULSION INTRAVENOUS at 18:41

## 2018-08-09 RX ADMIN — CEFTRIAXONE 2 G: 2 INJECTION, POWDER, FOR SOLUTION INTRAMUSCULAR; INTRAVENOUS at 05:01

## 2018-08-09 RX ADMIN — IPRATROPIUM BROMIDE AND ALBUTEROL SULFATE 3 ML: .5; 3 SOLUTION RESPIRATORY (INHALATION) at 03:04

## 2018-08-09 RX ADMIN — SODIUM CHLORIDE, POTASSIUM CHLORIDE, SODIUM LACTATE AND CALCIUM CHLORIDE: 600; 310; 30; 20 INJECTION, SOLUTION INTRAVENOUS at 04:17

## 2018-08-09 RX ADMIN — IPRATROPIUM BROMIDE AND ALBUTEROL SULFATE 3 ML: .5; 3 SOLUTION RESPIRATORY (INHALATION) at 00:13

## 2018-08-09 RX ADMIN — FAMOTIDINE 20 MG: 20 TABLET ORAL at 16:42

## 2018-08-09 RX ADMIN — AMIODARONE HYDROCHLORIDE 400 MG: 200 TABLET ORAL at 16:42

## 2018-08-09 ASSESSMENT — PULMONARY FUNCTION TESTS: FVC: 1.7

## 2018-08-09 NOTE — CARE PLAN
Problem: Respiratory:  Goal: Respiratory status will improve    Intervention: Assess and monitor pulmonary status  RN and RT performed oral care, pulmonary assessment, suction and mobilization during shift       Problem: Skin Integrity  Goal: Risk for impaired skin integrity will decrease    Intervention: Assess risk factors for impaired skin integrity and/or pressure ulcers  Patient turned q2, skin assessed by RN   Intervention: Implement precautions to protect skin integrity in collaboration with the interdisciplinary team  Precautions utilized to protect skin integrity

## 2018-08-09 NOTE — PROGRESS NOTES
Cortrak Placement    Tube Team verified patient name and medical record number prior to tube placement.  Cortrak tube (43 inches, 10 Samoan) placed at 69 cm in left nare.  Per Cortrak picture, tube appears to be in the stomach.  Nursing Instructions: Awaiting KUB to confirm placement before use for medications or feeding. Once placement confirmed, flush tube with 30 ml of water, and then remove and save stylet, in patient medication drawer.

## 2018-08-09 NOTE — CONSULTS
DATE OF SERVICE:  08/08/2018    INFECTIOUS DISEASE CONSULTATION    Seen at the request of Dr. Henrry Donahue.    IDENTIFICATION:  A 58-year-old intubated patient seen for evaluation of fever   and confusion.    HISTORY OF PRESENT ILLNESS:   The patient is presently intubated, but I was   able to get detailed history from talking to her  and her children and   sister.  Apparently, this patient is a 58-year-old female, really not that   remarkable past history, with mild hypertension and a history of chronic stasis   changes of her legs, who over the space about 48 hours, became increasingly   confused, lethargic and then was found to be unresponsive for about 12 hours.    When her  realized that she was not responsive on Monday night, the   paramedics were called.  At that time her fever was 104 degrees    when she was seen by the paramedics, hypertensive, her blood glucose was 110   and she was tachypneic.  She was intubated, found to be in respiratory failure   given fluids.  Since then, she has undergone procedures, but there has been   no clear etiology for this.  The family tells me that she was in [place] about   a month ago and did not go swimming there and has been back last month.  They   live near  [place].  She does do some outdoor activities and they have   noticed some mosquitoes in the area, but never remember the patient    complaining of this.  She has not had any rash.  They denied knowing that she   was coughing.  She has not had any diarrhea, no rash.  She is somewhat   confused and lethargic.  Denied headache as far they can remember.  No nausea,   no vomiting.  There is nobody else that has been ill.  Her  is doing   well.  There are really no other exposures.  The patient does have a history   of smoking and does not see a physician for that, and has had no recent   vaccinations.  She is, however, on CPAP, which she does not use for sleep   apnea and has a 20 years past  smoking history.    MEDICATIONS:  At present, she is on IV acyclovir, IV vancomycin, IV Rocephin,   and IV ampicillin as well as sedation medicine.  She is not on any pressors.Also on amiodorone    ALLERGIES:  No known antibiotic allergies.    SOCIAL HISTORY:  As noted above, she lives in [facility] about an hour   and a half from [place].  [place]  Denies any water   features around her house.  She smokes, absolutely no drug use.    MEDICATIONS:  As noted above.  She is only on losartan and gabapentin and   Coreg at home.  She is not taking the Neurontin.    REVIEW OF SYSTEMS:  Unable to obtain, but at least from the family, as I noted   above, she really has had just a neurological findings with some confusion,   and fatigue and progressive weakness, really nothing in terms of respiratory   complaints, no cough, no sputum, etc.  No cardiac complaints of chest pain.    No GI complaints such as diarrhea, nausea, vomiting.  Dermatologic, no skin   conditions.    PHYSICAL EXAMINATION:  GENERAL:  She is intubated and sedated, but does move her head when we move   her legs and may be responding to her family some response.  She is afebrile.    She is on amiodarone however for atrial fibrillation and is now in sinus   rhythm and she is on 40% oxygen.  T-max of 101.2 degrees now.  NECK:  Minimally rigid, but otherwise seems normal.  Her conjunctivae are   somewhat swollen and red.  LUNGS:  Clear.  HEART:  Reveals no murmur.  ABDOMEN:  Belly exam is unremarkable.  I cannot palpate any organs.  EXTREMITIES:  Again, no petechiae or ecchymoses, etc.  She has a negative   Kernig's and neurologically she is very sedated and unable to further   evaluate.    LABORATORY DATA:  White count on admission was 16,000 and today is at 15,000.    Her chemistry panel shows AST 61, PT at 33.  CPK of 2390.  Lactic acid is   2.0.  Creatinine 1.29, previously 0.9 last year.  Blood sugar 121.  There is   no  alcohol in her system.  Tylenol was not detectable.  Microbiology so far   are negative blood cultures.  She did have a quantitative bronch and I have   Reviewed the gram stain  in the micro lab.  She has greater than 5%, intracellular bacteria   and they mostly are gram-positive cocci.  I do not see any clusters.  There   are some changes and then possibly diplococci, cannot say for sure.  The CT   scan done of her chest shows atelectasis versus left lower lobe consolidation, otherwise is   clear.  She had a lumbar puncture done, tube #4, clear, colorless, white   cells, 36, 60% polys, 33 lymphs, 4 monocytes, red cells 187, glucose normal   79, total protein elevated at 88.  CAT scan of her head was completed and   unremarkable without contrast.    ASSESSMENT:  This is a 58-year-old smoker, history of sleep apnea who presents   with a few days of apparently fever, confusion, lethargy and basically became   unresponsive with no localizing symptoms at all, such as cough, diarrhea, or   anything to suggest possible sources of infection in her.  The possibilities   would seem to include viral encephalitis, we have a HSV PCR pending and she   is on acyclovir and if the PCR is negative, we will probably stop the   acyclovir.  In addition, she could have been exposed to the West Nile virus   given the middle of summer and hot weather and presence of the mosquitoes, this   could be a West Nile virus encephalitis. the treatment is supportive and   the team is going to obtain West Nile virus IgM from the health department   today.  In addition, the patient has a bronchoscopy showing a lot of   gram-positive cocci, possibly diplococcus this could be respiratory failure   secondary to  pneumococcal pneumonia and she may be encephalopathic just   reflex CO2 retention, etc.  I see no indication at all for continuing   vancomycin or ampicillin.  There is nothing suggests MRSA or listeria.    RECOMMENDATIONS:  1.  Discontinue  vancomycin.  2.  Discontinue ampicillin.  3.  Continue Rocephin, at CAP dosing, not meningitis dosing.  4.  Continue acyclovir, obtain HSV PCR that if negative, I would discontinue   that.  5.  Obtain West Nile virus serology, look for IgM, and wait for the   respiratory culture and blood culture results.  I have discussed this with the   house staff as well as the family and I will continue to follow with you on   this very challenging patient.       ____________________________________     MD FLORINA CANTU / NTS    DD:  08/08/2018 16:09:33  DT:  08/08/2018 17:55:42    D#:  [id number]  Job#:  [id number]

## 2018-08-09 NOTE — DIETARY
"Nutrition Support Assessment     Nutrition services:   Day 1 of admit.  Salome Quinones is a 58 y.o. female with admitting DX of Acute hypoxemic respiratory failure (HCC), Severe sepsis (HCC)     Current problem list:  1. Pt found down @ home after not feeling well for a couple of days.  2. Pt was intubated in the field, now vent day 3.  3. UTI, aspiration PNA; ID on.   4. Mild rhabdo; CPK 2390.  5. H/o HTN, obesity, sleep apnea (non-compliant with CPAP), smoker  6. Abrasions, rash, and skin tears noted; Wound Team on.  7. Loose stools with BMS in place.      Assessment:  Estimated Nutritional Needs based on:   Height: 180.3 cm (5' 11\")  Weight: (!) 138 kg (304 lb 3.8 oz)  Ideal Body Weight: 70.3 kg (155 lb)  Percent Ideal Body Weight: 196.3  Body mass index is 42.43 kg/m².    Calculation/Equation: REE per MSJ = 2069 kcal/day; RMR per PSU (vent L/min 12, T max/24 hours 38.1) = 2506 kcal/day (65-70% = 3782-8098 kcal/day)  Total Calories / day: 1518 - 1932 (Calories / k - 14)  Total Grams Protein / day: 166 - 207 (Grams Protein / k.2 - 1.5) (2.0-2.5 grams/kg IBW = 141-176 grams/day)     Evaluation:   1. Pt is intubated and unable to take PO diet.  2. Orders received for TF. Await Cortrak placement.  3. Estimated needs based on critical care obesity guidelines.   4. Labs reviewed.   5. Meds reviewed. LR @ 125 ml/hr, electrolyte replacement, propofol @ 30 mcg/kg/min (24.1 ml/hr = 636 kcal/day), bowel meds.    6. High-protein, low-calorie TF formula is indicated to meet pt's estimated needs.      Malnutrition Risk: No nutritional admit screen completed.      Recommendations/Plan:  1. Start Peptamen Intense VHP @ 25 ml/hr and advance per protocol to goal rate with propofol 65 ml/hr to provide 1560 kcal (+Kcal from propofol), 145 grams protein, and 1310 ml free water per day.  2. When propofol D/c'd, increase TF to final goal rate 80 ml/hr to provide 1920 kcal, 179 grams protein, and 1613 ml free water per " day.  3. Fluids per MD.    ROB following.

## 2018-08-09 NOTE — PROGRESS NOTES
Pulmonary Critical Care Progress Note        Chief Complaint: Respiratory failure    History of Present Illness: I was kindly asked to see and evaluate Salome Quinones, a 58 y.o. female for evaluation and management of the above problem.     The entire history is obtained from healthcare providers and the medical record as this lady cannot give me any history.  This lady has a history of hypertension and sleep apnea.  Apparently she was sick on Sunday.  Monday she was hot and diaphoretic and did not feel good.  She declined to go to the hospital at that time.  Monday night she slept on the couch and apparently fell onto the floor during the night.  This morning she was on the floor, but her family felt that she was sleeping.  This evening, EMS was activated when the family realized that she had not moved from the floor all day.  There was evidence of emesis at the scene.  EMS found the patient have a temperature of 104°F and she was breathing 40 times per minute.  She was intubated and transported to [facility].    Please note above history was obtained by my ICU colleague Dr. Ying.     ROS:  Respiratory: unable to perform due to the patient's inability to effectively communicate, Cardiac: unable to perform due to the patient's inability to effectively communicate, GI: unable to perform due to the patient's inability to effectively communicate.  All other systems negative.    Interval Events:  24 hour interval history reviewed    Found on 7th and intubated   Mild rhabdomyolysis  2390 for CK  RASS -1 to -2   Following commands  Amiodarone infusion and convert to 400 mg bid  TF orders via feeding tube beginning today  Loose stools 150 cc overnight  Tmax slightly high  RIJ CVP  LR 125cc/hour , but decreased to to 50 cc/hour  Day 3 with RR 28, PEEP 8  40 %  RSBI 35, NIF -25, but not ready for extubation yet  Lovenox to bid b/c of size  Rocephin and Acyclovir, AMP stopped  HSV PCR pending   CSF data still pending,  and concerning for possible bacterial meningitis per report from ID  Diplococci on Gram stain of the sputum.    PFSH:  No change.    Respiratory:  Kenney Vent Mode: APVCMV, Rate (breaths/min): 28, Vt Target (mL): 430, PEEP/CPAP: 10, FiO2: 40, Static Compliance (ml / cm H2O): 61, Control VTE (exp VT): 433  Pulse Oximetry: 96 %  Chest Tube Drains:          Exam: rhonchi bibasilar  ImagingCXR  I have personally reviewed the chest x-ray my impression is   right IJ central catheter in good position, ET tube in good position, and elevation of the right hemidiaphragm.  There is hazy alveolar infiltrates. Essentially unchanged from yesterday.  Recent Labs      08/07/18   2207  08/08/18   0545  08/09/18   0457   ISTATAPH  7.250*  7.376*  7.426   ISTATAPCO2  50.4*  36.1  34.3   ISTATAPO2  82  60*  85   ISTATATCO2  24  22  24   [id number]  94  90*  97   ISTATARTHCO3  22.1  21.1  22.5   ISTATARTBE  -6*  -3  -1   ISTATTEMP  39.3 C  39.1 C  36.2 C   ISTATFIO2  60  80  40   ISTATSPEC  Arterial  Arterial  Arterial   ISTATAPHTC  7.219*  7.345*  7.438   OZPIRTTH0KN  95*  70  81       HemoDynamics:  Pulse: 68, Heart Rate (Monitored): 73  NIBP: 130/77       Exam: Irregularly irregular rhythm on amiodarone infusion. Patient at LR at 1 25 mL per hour.  Imaging: echo Reviewed   8/8/2018  Transthoracic  Echo Report      Echocardiography Laboratory    CONCLUSIONS  No prior study is available for comparison.   Normal left ventricular systolic function.  Left ventricular ejection fraction is visually estimated to be 60%.  Grade I diastolic dysfunction.  The right ventricle was normal in size and function.  No significant valve disease or flow abnormalities.   Recent Labs      08/07/18   2034  08/07/18   2130  08/09/18   0344   CPKTOTAL   --   2390*  2390*   TROPONINI  0.12*   --   0.15*       Neuro:  GCS Total Ripon Coma Score: 7       Exam: Heavily sedated. Patient remains on propofol infusion. She withdraws to painful stimulus.  Reflexes are equal upper and lower extremities. She has downgoing plantar reflexes. I do not detect obvious nuchal rigidity on exam. She is slightly more alert today with decrease in sedation.    Lumbar puncture data from 8/8/2018 at 120 in the a.m.:    Results for LIBBY RIGGINS (MRN [id number]) as of 8/8/2018 18:53   Ref. Range 8/8/2018 01:20   Number Of Tubes Unknown 4   Volume Latest Units: mL 3.0   Color-Body Fluid Unknown Colorless   Character-Body Fluid Unknown Clear   Total WBC Count Latest Ref Range: 0 - 10 cells/uL 36 (H)   Total RBC Count Latest Units: cells/uL 187   Crenated RBC Latest Units: % 0   Polys Latest Units: % 60   Lymphs Latest Units: % 33   Mononuclear Cells - CSF Latest Units: % 4   Unidentified Cells - CSF Latest Units: % 3   CSF Nucleated Red Blood Cell Unknown 1   CSF Tube Number Unknown 3   Glucose CSF Latest Ref Range: 40 - 80 mg/dL 79   Total Protein, CSF Latest Ref Range: 15 - 45 mg/dL 88 (H)     Imaging: Available data reviewed.  Impression       1.  No acute intracranial abnormality.  2.  LEFT parietal scalp swelling.  3.  Chronic paranasal sinus disease.     Fluids:  Intake/Output       08/07/18 0700 - 08/08/18 0659 08/08/18 0700 - 08/09/18 0659 08/09/18 0700 - 08/10/18 0659      7137-3775 8797-5211 Total 2113-3803 2592-4124 Total 8141-7118 5077-9436 Total       Intake    I.V.  --  66.4 66.4  80  869.8 949.8  --  -- --    Amiodarone Volume -- -- -- -- 185 185 -- -- --    Propofol Volume -- 66.4 66.4 80 184.8 264.8 -- -- --    IV Volume (LR) -- -- -- -- 500 500 -- -- --    Total Intake -- 66.4 66.4 80 869.8 949.8 -- -- --       Output    Urine  --  500 500  1150  650 1800  --  -- --    Output (mL) (Urinary Catheter Indwelling Catheter) --  650 1800 -- -- --    Stool  --  -- --  --  125 125  --  -- --    Number of Times Stooled -- -- -- 1 x -- 1 x -- -- --    Output (mL) (Rectal Tube Group Bowel Management System) -- -- -- -- 125 125 -- -- --    Total Output -- 500 500  0383 537 9391 -- -- --       Net I/O     -- -433.6 -433.6 -1070 94.8 -975.2 -- -- --        Weight: (!) 138 kg (304 lb 3.8 oz)  Recent Labs      18   0344   SODIUM  144  139  138   POTASSIUM  3.1*  3.8  4.4   CHLORIDE  117*  109  109   CO2  19*  24  22   BUN  28*  34*  27*   CREATININE  1.29  1.47*  0.88   MAGNESIUM  1.5  2.5  2.2   PHOSPHORUS  3.4  3.7  2.9   CALCIUM  5.5*  8.2*  8.0*       GI/Nutrition:  Exam: abdomen is soft and non-tender. Patient is morbidly obese. Scars are present on surgery noted.  Imaging: None - Reviewed  NPO  Liver Function  Recent Labs      18   0344   ALTSGPT  33   --    --    ASTSGOT  61*   --    --    ALKPHOSPHAT  68   --    --    TBILIRUBIN  0.9   --    --    GLUCOSE  121*  116*  136*       Heme:  Recent Labs      18   0344   RBC  5.82*  4.75  4.55   HEMOGLOBIN  17.2*  13.8  13.5   HEMATOCRIT  53.3*  43.6  41.3   PLATELETCT  254  183  158*       Infectious Disease:  Monitored Temp 2  Av.8 °C (98.3 °F)  Min: 35.9 °C (96.6 °F)  Max: 38.3 °C (100.9 °F)  Temp  Av.6 °C (101.5 °F)  Min: 38.6 °C (101.5 °F)  Max: 38.6 °C (101.5 °F)  Micro: antibiotics reviewed. Patient on meningeal doses of vancomycin, Rocephin, acyclovir remaining in place. Steroids were also given yesterday since steroids were not given initially, but no need for further steroids at this time.    Ampicillin discontinued by infectious disease as is unlikely she has listeria.     Cultures from BAL data are showing many gram-positive cocci at this time possibly consistent with Streptococcus pneumonia.   Peripheral blood cultures remain negative at this time.   CSF now revealing possible few gram-positive cocci.   Fever is noted as well as leukocytosis, yesterday, but improving. Fever does persist however.    Recent Labs      18   2034  18   2130  18   0800  18   0344   WBC   16.3*   --   15.2*  9.9   NEUTSPOLYS  73.60*   --   63.90  82.30*   LYMPHOCYTES  12.10*   --   19.00*  10.30*   MONOCYTES  13.40   --   16.20*  7.00   EOSINOPHILS  0.00   --   0.00  0.00   BASOPHILS  0.30   --   0.50  0.10   ASTSGOT   --   61*   --    --    ALTSGPT   --   33   --    --    ALKPHOSPHAT   --   68   --    --    TBILIRUBIN   --   0.9   --    --      Current Facility-Administered Medications   Medication Dose Frequency Provider Last Rate Last Dose   • propofol (DIPRIVAN) injection  0-80 mcg/kg/min Continuous Henrry Ying M.D. 24.1 mL/hr at 08/09/18 0520 30 mcg/kg/min at 08/09/18 0520   • enoxaparin (LOVENOX) inj 40 mg  40 mg DAILY Liborio Johnson M.D.   40 mg at 08/09/18 0503   • lactated ringers infusion   Continuous Henrry Ying M.D. 125 mL/hr at 08/09/18 0417     • acyclovir (ZOVIRAX) 700 mg in  mL IVPB  700 mg Q8HRS Henrry Ying M.D.   Stopped at 08/09/18 0656   • amiodarone (CORDARONE) 450 mg in D5W 250 mL Infusion  0.5-1 mg/min Continuous Henrry Ying M.D. 17 mL/hr at 08/09/18 0011 0.5 mg/min at 08/09/18 0011   • cefTRIAXone (ROCEPHIN) 2 g in  mL IVPB  2 g Q24HRS Sean Kiran M.D.   Stopped at 08/09/18 0531   • Respiratory Care per Protocol   Continuous RT Henrry Ying M.D.       • senna-docusate (PERICOLACE or SENOKOT S) 8.6-50 MG per tablet 2 Tab  2 Tab BID Henrry Ying M.D.   Stopped at 08/08/18 0600    And   • polyethylene glycol/lytes (MIRALAX) PACKET 1 Packet  1 Packet QDAY PRN Henrry Ying M.D.        And   • magnesium hydroxide (MILK OF MAGNESIA) suspension 30 mL  30 mL QDAY PRN Henrry Ying M.D.        And   • bisacodyl (DULCOLAX) suppository 10 mg  10 mg QDAY PRN Henrry Ying M.D.       • MD ALERT...Adult ICU Electrolyte Replacement per Pharmacy Protocol   pharmacy to dose Henrry Ying M.D.       • fentaNYL (SUBLIMAZE) injection 25 mcg  25 mcg Q HOUR PRN Henrry Orourke  NATHEN Banerjee        Or   • fentaNYL (SUBLIMAZE) injection 50 mcg  50 mcg Q HOUR PRN Henrry Ying M.D.        Or   • fentaNYL (SUBLIMAZE) injection 100 mcg  100 mcg Q HOUR PRN Henrry Ying M.D.   100 mcg at 08/09/18 0140   • ipratropium-albuterol (DUONEB) nebulizer solution  3 mL Q2HRS PRN (RT) Henrry Ying M.D.       • ipratropium-albuterol (DUONEB) nebulizer solution  3 mL Q4HRS (RT) Henrry Ying M.D.   3 mL at 08/09/18 0716   • famotidine (PEPCID) tablet 20 mg  20 mg Q12HRS Henrry Ying M.D.        Or   • famotidine (PEPCID) injection 20 mg  20 mg Q12HRS Henrry Ying M.D.   20 mg at 08/09/18 0503   • insulin regular (HUMULIN R) injection 2-9 Units  2-9 Units Q6HRS Henrry Ying M.D.   Stopped at 08/08/18 1200    And   • glucose 4 g chewable tablet 16 g  16 g Q15 MIN PRN Henrry Ying M.D.        And   • dextrose 50% (D50W) injection 25 mL  25 mL Q15 MIN PRN Henrry Ying M.D.         Last reviewed on 8/8/2018  5:12 PM by Azul Arcos [facility]    Quality  Measures:   Labs reviewed, Medications reviewed and Radiology images reviewed   Raygoza catheter: No Raygoza   Central line in place: Sepsis     DVT Prophylaxis: Enoxaparin (Lovenox)   DVT prophylaxis - mechanical: SCDs   Ulcer prophylaxis: Yes   Antibiotics: Treating active infection/contamination beyond 24 hours perioperative coverage      Impression and plan:    1. Acute severe hypoxemic respiratory failure    -Patient has evidence of infiltrates and gram-positive cocci on BAL data so far, likely consistent with streptococcal pneumonia.  -Patient has recent history of pneumonia  -Mental status change possibly consistent with other infection including aseptic meningitis, and even possible bacterial meningitis.  -Continue her ventilator bundle  -Continue with elevated PEEP slightly to better aerate right lung though there may be a component of chronic right hemidiaphragm elevation,  essentially unchanged from yesterday.  -Obesity with obstructive sleep apnea as noted. Patient noncompliant.    2. Encephalopathy.    -Patient's LP data is notable for WBC with predominance of polys, however patient did have normal glucose and slightly elevated protein.  -There is also element of increased lymphocytes  -CSF is now showing some gram-positive cocci possibly consistent with streptococcal pneumonia in the CSF.  -Continue meningeal dose treatment, but can decrease steroids to off  -Appreciate infectious disease continued to follow closely.     3. A. fib with rapid ventricular rate    -With now converted to oral therapy today.  -She is at high risk for developing repeated atrial fibrillation with rapid rate, however    4. Probable aspiration event    -The patient's depressed mental status, she has been started on aspiration coverage, no most likely streptococcal pneumonia remains.    5. Hypertensive heart disease    -Follow-up echocardiogram reveals grade 2 diastolic dysfunction  -Watch for significant fluid overload    6. Mild rhabdomyolysis    -Follow CPK and continue with fluid resuscitation    7. Wounds to the right shoulder and posterior legs possibly related to down time.    -Follow-up with wound care as needed.    Discussed patient condition and risk of morbidity and/or mortality with MD rounds  The patient remains critically ill.  Critical care time = 45 minutes in directly providing and coordinating critical care and extensive data review.  No time overlap and excludes procedures.    Remi Martinez D.O.  Critical Care Medicine

## 2018-08-09 NOTE — PROGRESS NOTES
Med Rec complete per Pt's family at bedside and Isabelle @ [phone]  Allergies Reviewed with family  No ABX filled in the last 30 days    Pt's family was unsure of medications pt takes but stated that she takes her medications regularly.   Medication information received from Isabelle

## 2018-08-09 NOTE — PROGRESS NOTES
Internal Medicine Interval Note  Note Author: Yris Mabry M.D.     Name Salome Quinones 1960   Age/Sex 58 y.o. female   MRN [id number]   Code Status FULL      After 5PM or if no immediate response to page, please call for cross-coverage  Attending/Team: Dr. Martinez / HUMPHREY Sheehan See Patient List for primary contact information  Call [phone] to page    1st Call - Day Intern (R1):   N/A 2nd Call - Day Sr. Resident (R2/R3):   Dr. Mabry         Reason for interval visit  (Principal Problem)   Severe sepsis (HCC)    Interval Problem Daily Status Update  (24 hours)     ID : This 58 year-old lady with a PMHx of HTN was found unresponsive on the floor by her  on 2018 evening. She felt unwell on , was lethargic and possible headache on 18, denies any specific symptoms, was last found awake and alert on  pm by her . She was found laying on the floor on 8 am and family felt she was sleeping on the floor, still on floor on 8 pm and did not respond which is when family got concerned and called EMS. She was in respiratory distress with RR of 40 when EMS arrived, with /110, Temp of 104 F and blood glucose of 110. She was intubated at the scene. In [facility], she was febrile with temp of 38.8 F, tachycardic with -120. She had leucocytosis of 16.3 on admission with lactate of 2, and scored 4/4 on SIRS and 2 on qSOFA. CT Head did not reveal any acute intracranial abnormality. CT C-spine showed some degenerative changes. CXR revealed bibasilar atelectasis. CTPA was negative for pulmonary embolus but did show some prominent pulmonary vessels in keeping with pulmonary hypertension. A lumbar puncture was performed. CSF showed clear fluid, WBC 36, , with mildly elevated protein of 88. Gram stain and culture negative so far. Blood cultures negative so far. Bronchoscopy revealed yellow secretions bilaterally, BAL was sent for culture. Urine analysis revealed nitrites,  leucocyte esterase, WBC 5-10, nil on gram stain. She was started on ampicillin, ceftriaxone, vancomycin and acyclovir for severe sepsis with possible sources being meningitis vs aspiration pneumonia vs UTI. Procalcitonin elevated at 0.36. Electrolyte abnormalities repleted on admission. CPK elevated at 2390. She went into atrial fibrillation with RVR and was started on amiodarone.     Interval events 8/9/2018 :  - afebrile  - HR 70s, NSR  - -150s  - continues to remain on vent support, had an hour of SBT this morning  - remains on propofol  - not on vasopressors  - renal function improved  - on  ml/hr  - home anti-HTN sives being held  - s/b Dr. Kiran on 8/8, d/c'ed ampicillin and vancomycin, remains on rocephine and acyclovir  - Strept pneumo in BAL and in CSF  - moving extremities. responding to family  - Dr. Kiran, ID on board  - to treat as pneumonia and meningitis    Plan  - increase ceftriaxone to 2 gms BID  - d/c acyclovir  - appreciate ID recs  - a/w PCR and WNV serology pending  - change IV to PO amiodarone   - continue IVF  - CorTrak and TF  - prophylactic Lovenox increased to BID in view of weight/BMI      Review of Systems   Unable to perform ROS: Intubated       Consultants/Specialty  PMA - Dr. Martinez  ID - Dr. Kiran  PCP: @PCP    Disposition  ICU    Quality Measures  Quality-Core Measures   Reviewed items::  EKG reviewed, Medications reviewed, Labs reviewed and Radiology images reviewed  Raygoza catheter::  Critically Ill - Requiring Accurate Measurement of Urinary Output  Central line in place:  Sepsis  DVT prophylaxis pharmacological::  Enoxaparin (Lovenox)  DVT prophylaxis - mechanical:  SCDs  Ulcer Prophylaxis::  Yes  Antibiotics:  Treating active infection/contamination beyond 24 hours perioperative coverage          Physical Exam       Vitals:    08/09/18 0800 08/09/18 0900 08/09/18 0917 08/09/18 1000   Pulse: 85 73  75   Resp: (!) 23 (!) 28  (!) 28   Temp:       SpO2: 97% 96% 96%  96%   Weight:       Height:         Body mass index is 42.43 kg/m². Weight: (!) 138 kg (304 lb 3.8 oz)  Oxygen Therapy:  Pulse Oximetry: 96 %, FiO2%: 40 %, O2 Delivery: Ventilator    Physical Exam   Constitutional: She is well-developed, well-nourished, and in no distress. No distress.   Intubated and ventilated   HENT:   Head: Normocephalic and atraumatic.   Eyes: Conjunctivae are normal. No scleral icterus.   Neck: No JVD present.   Cardiovascular:   irregularly irregular   Pulmonary/Chest: She has no wheezes.   Intubated and ventilated   Abdominal: Soft. Bowel sounds are normal. She exhibits no distension. There is no tenderness. There is no rebound and no guarding.   Musculoskeletal:   Hyperkeratinization B/L LE   Lymphadenopathy:     She has no cervical adenopathy.   Neurological:   Intubated   Skin: She is not diaphoretic.         Lab Data Review:         8/8/2018  11:03 AM    Recent Labs      08/07/18 2130 08/08/18 0800 08/09/18   0344   SODIUM  144  139  138   POTASSIUM  3.1*  3.8  4.4   CHLORIDE  117*  109  109   CO2  19*  24  22   BUN  28*  34*  27*   CREATININE  1.29  1.47*  0.88   MAGNESIUM  1.5  2.5  2.2   PHOSPHORUS  3.4  3.7  2.9   CALCIUM  5.5*  8.2*  8.0*       Recent Labs      08/07/18 2130 08/08/18 0800 08/09/18   0344   ALTSGPT  33   --    --    ASTSGOT  61*   --    --    ALKPHOSPHAT  68   --    --    TBILIRUBIN  0.9   --    --    GLUCOSE  121*  116*  136*       Recent Labs      08/07/18 2034 08/08/18 0800 08/09/18   0344   RBC  5.82*  4.75  4.55   HEMOGLOBIN  17.2*  13.8  13.5   HEMATOCRIT  53.3*  43.6  41.3   PLATELETCT  254  183  158*       Recent Labs      08/07/18 2034 08/07/18 2130 08/08/18 0800 08/09/18   0344   WBC  16.3*   --   15.2*  9.9   NEUTSPOLYS  73.60*   --   63.90  82.30*   LYMPHOCYTES  12.10*   --   19.00*  10.30*   MONOCYTES  13.40   --   16.20*  7.00   EOSINOPHILS  0.00   --   0.00  0.00   BASOPHILS  0.30   --   0.50  0.10   ASTSGOT   --   61*   --     --    ALTSGPT   --   33   --    --    ALKPHOSPHAT   --   68   --    --    TBILIRUBIN   --   0.9   --    --            Assessment/Plan     * Severe sepsis (HCC)   Assessment & Plan    - Admitted following found unresponsive on the floor on 8/7/18 night with temp 104 F, /100 and blood glucose 110, RR 40 and trismus, intubated by EMS on the scene after fentanyl, rocuronium, versed, ketamine, 2 lts IVF  - per family ( and son), pt was unwell since Sunday 8/5 pm, was last seen awake and alert on 8/6 pm, c/o headache but no other symptoms,  saw her on floor on 8/6 night and 8/7 am and thought she was sleeping on floor, did not wake / respond 8/7 evening which is when family were concerned and called EMS.   - In ER, , /117, temp 38.8 C with WBC 16.3, lactate was 2 on admission. WBC down to 15.3 on 8/8/18, lactate down to 1.8  - SIRS 4/4, qSOFA 2 on admission  - CXR initial 8/7 : LLL atelectasis, intubated into right bronchus, ETT repositioned. CXR 8/8/18 - Bibasilar atelectasis, ETT in trachea  - UA : nitrites +ve, small LE, WBC 5-10, negative for bacteria.   - CT Head 8/7/18 : nil acute  - CTPA 8/7/18 : no PE, prominent pulm vessels in keeping with pHTN, possible PNA  LLL on review of images  - CT spine 8/7/18 : DJD, no fracture  - LP 8/7 : clear CSF, WBC 36, , protein 88, glucose 40, HSV in process  - Bronchoscopy : bilateral yellow secretions  - procal 0.36  - not on pressors  - reviewed by Dr. Kiran 8/8/2018  - BAL +ve for strept pneumo, CSF positive for strept pneumo  - ampicillin and vancomycin discontinued 8/8 per ID recs  Imp : Severe sepsis secondary to pneumonia and meningitis    Plan  - increase rocephine to 2 gms BID  - d/c Acyclovir  - appreciate ID recs  -  ml/hr  - follow-up WNV and HSV   - CorTrak with TF          Encephalopathy secondary to meningitis and hypoxia from pneumonia   Assessment & Plan    - found unresponsive on the floor on 8/7/18 night  -  tylenol level < 10, BAL 0, salicylate 0  - CT Head - nil acute  - CT spine - no fracture  - CSF 8/7 : WBC 36, Protein 88, else wnl, nil on gram stain  - could be due to severe sepsis, potential source - pulmonary vs viral meningeal vs urinary  - currently intubated and ventilated  - had a dose of dexamethasone on 8/8/18  - on ceftriaxone and acyclovir  - Dr. Kiran, ID on board  - Strep pneumo in BAL and CSF    Plan  - increase rocephine to 2 gms BID  - d/c acyclovir   - a/w HSV and WNV serology awaited.        Aspiration pneumonia (HCC)   Assessment & Plan    - could be possible source of sepsis given yellow secretions on bronchoscopy.  - on rocephine, IVF  - intubated, continue vent support per PMA and RT  - DUONEBS        Acute hypoxemic respiratory failure (HCC)   Assessment & Plan    - Intubated by EMS due to tachypnea, respiratory and metabolic acidosis on initial ABG, improved this am  - on PEEP 10, FiO2 40%  - Duonebs  - on rocephine for possible pneumonia    Plan  - continue vent settings per PMA, RT  - SBT        Atrial fibrillation with RVR (HCC)   Assessment & Plan    - new onset this admission in the context of sepsis  - on IV due to on and off AFib RVR  - d/c IV amiodarone, change to PO        Hyperglycemia   Assessment & Plan    - mild 121 8/7, expected in the current septic phase  - No known history of diabetes.    - A1c April 2017 was 5.5  - BG at scene by EMS was 110  - insulin sliding scale        Atelectasis   Assessment & Plan    - Documented on CXR  - CTM        Abnormal urinalysis   Assessment & Plan    - U/A on 8/7/2018 : nitrite positive, small amount leucocyte esterase, WBC 5-10, no bacteria  - await culture  - on rocephine        Elevated troponin   Assessment & Plan    - On admission trop 0.12.  EKG no st/t changes  - Trop 0.15 on 8/9, on amiodarone for new AFib RVR, in NSR this am  - ctm        Rhabdomyolysis   Assessment & Plan    - Patient found down unresponsive.   - CPK on admission  2390   - BUN/Creat 28/1.29, eGFR 43, low K at 3.1  - Rpt CPK on 8/9 is 2390  - IVF @ 125  - ctm        Hypocalcemia   Assessment & Plan    - adjusted calcium was 6.46 on admission 8/7  - had 1 gm calcium gluconate and 1 gm calcium chloride since admission  - 8/9/18, ionized calcium 1.1  - ctm and replete as needed          Hypomagnesemia   Assessment & Plan    - Mag 1.5 on admission  - had 4 gms of IV Mag  - ctm and aim Mag > 2        Hypokalemia   Assessment & Plan    - K was 3.1 on admission  - had K repletion  - K this am > 4  - ctm and replete to aim K>4        Pulmonary hypertension (HCC)   Assessment & Plan    - CTPA showed prominent pulmonary vessels suggesting pulmonary hypertension   - TTE on 8/8/2018 : normal LV systolic function, EF 60%        Essential hypertension- (present on admission)   Assessment & Plan    - Home meds :  hydralazine, carvedilol, losartan  - not on pressors at present  - Holding home antihypertensives at present

## 2018-08-10 ENCOUNTER — APPOINTMENT (OUTPATIENT)
Dept: RADIOLOGY | Facility: MEDICAL CENTER | Age: 58
DRG: 853 | End: 2018-08-10
Attending: INTERNAL MEDICINE

## 2018-08-10 PROBLEM — J15.4 STREPTOCOCCAL PNEUMONIA (HCC): Status: ACTIVE | Noted: 2018-08-08

## 2018-08-10 LAB
ACTION RANGE TRIGGERED IACRT: NO
ANION GAP SERPL CALC-SCNC: 10 MMOL/L (ref 0–11.9)
BASE EXCESS BLDA CALC-SCNC: -1 MMOL/L (ref -4–3)
BASOPHILS # BLD AUTO: 0.2 % (ref 0–1.8)
BASOPHILS # BLD: 0.02 K/UL (ref 0–0.12)
BODY TEMPERATURE: ABNORMAL DEGREES
BUN SERPL-MCNC: 24 MG/DL (ref 8–22)
CA-I SERPL-SCNC: 1.1 MMOL/L (ref 1.1–1.3)
CALCIUM SERPL-MCNC: 7.9 MG/DL (ref 8.5–10.5)
CHLORIDE SERPL-SCNC: 110 MMOL/L (ref 96–112)
CK SERPL-CCNC: 1061 U/L (ref 0–154)
CO2 BLDA-SCNC: 24 MMOL/L (ref 20–33)
CO2 SERPL-SCNC: 23 MMOL/L (ref 20–33)
CREAT SERPL-MCNC: 0.78 MG/DL (ref 0.5–1.4)
EOSINOPHIL # BLD AUTO: 0.03 K/UL (ref 0–0.51)
EOSINOPHIL NFR BLD: 0.3 % (ref 0–6.9)
ERYTHROCYTE [DISTWIDTH] IN BLOOD BY AUTOMATED COUNT: 50.7 FL (ref 35.9–50)
GLUCOSE BLD-MCNC: 76 MG/DL (ref 65–99)
GLUCOSE BLD-MCNC: 79 MG/DL (ref 65–99)
GLUCOSE BLD-MCNC: 80 MG/DL (ref 65–99)
GLUCOSE SERPL-MCNC: 89 MG/DL (ref 65–99)
HCO3 BLDA-SCNC: 23.1 MMOL/L (ref 17–25)
HCT VFR BLD AUTO: 37.2 % (ref 37–47)
HGB BLD-MCNC: 12.2 G/DL (ref 12–16)
IMM GRANULOCYTES # BLD AUTO: 0.03 K/UL (ref 0–0.11)
IMM GRANULOCYTES NFR BLD AUTO: 0.3 % (ref 0–0.9)
INST. QUALIFIED PATIENT IIQPT: YES
LYMPHOCYTES # BLD AUTO: 2.4 K/UL (ref 1–4.8)
LYMPHOCYTES NFR BLD: 25.3 % (ref 22–41)
MAGNESIUM SERPL-MCNC: 2 MG/DL (ref 1.5–2.5)
MCH RBC QN AUTO: 29.6 PG (ref 27–33)
MCHC RBC AUTO-ENTMCNC: 32.8 G/DL (ref 33.6–35)
MCV RBC AUTO: 90.3 FL (ref 81.4–97.8)
MISCELLANEOUS LAB RESULT MISCLAB: NORMAL
MONOCYTES # BLD AUTO: 0.84 K/UL (ref 0–0.85)
MONOCYTES NFR BLD AUTO: 8.9 % (ref 0–13.4)
NEUTROPHILS # BLD AUTO: 6.16 K/UL (ref 2–7.15)
NEUTROPHILS NFR BLD: 65 % (ref 44–72)
NRBC # BLD AUTO: 0 K/UL
NRBC BLD-RTO: 0 /100 WBC
O2/TOTAL GAS SETTING VFR VENT: 40 %
PCO2 BLDA: 34.2 MMHG (ref 26–37)
PCO2 TEMP ADJ BLDA: 34.3 MMHG (ref 26–37)
PH BLDA: 7.44 [PH] (ref 7.4–7.5)
PH TEMP ADJ BLDA: 7.44 [PH] (ref 7.4–7.5)
PHOSPHATE SERPL-MCNC: 2 MG/DL (ref 2.5–4.5)
PLATELET # BLD AUTO: 163 K/UL (ref 164–446)
PMV BLD AUTO: 10.4 FL (ref 9–12.9)
PO2 BLDA: 88 MMHG (ref 64–87)
PO2 TEMP ADJ BLDA: 89 MMHG (ref 64–87)
POTASSIUM SERPL-SCNC: 3.8 MMOL/L (ref 3.6–5.5)
RBC # BLD AUTO: 4.12 M/UL (ref 4.2–5.4)
SAO2 % BLDA: 97 % (ref 93–99)
SODIUM SERPL-SCNC: 143 MMOL/L (ref 135–145)
SPECIMEN DRAWN FROM PATIENT: ABNORMAL
WBC # BLD AUTO: 9.5 K/UL (ref 4.8–10.8)

## 2018-08-10 PROCEDURE — 82330 ASSAY OF CALCIUM: CPT

## 2018-08-10 PROCEDURE — 700101 HCHG RX REV CODE 250: Performed by: INTERNAL MEDICINE

## 2018-08-10 PROCEDURE — 700105 HCHG RX REV CODE 258: Performed by: INTERNAL MEDICINE

## 2018-08-10 PROCEDURE — 71045 X-RAY EXAM CHEST 1 VIEW: CPT

## 2018-08-10 PROCEDURE — 82550 ASSAY OF CK (CPK): CPT

## 2018-08-10 PROCEDURE — 82803 BLOOD GASES ANY COMBINATION: CPT

## 2018-08-10 PROCEDURE — 94003 VENT MGMT INPAT SUBQ DAY: CPT

## 2018-08-10 PROCEDURE — 36600 WITHDRAWAL OF ARTERIAL BLOOD: CPT

## 2018-08-10 PROCEDURE — A9270 NON-COVERED ITEM OR SERVICE: HCPCS | Performed by: INTERNAL MEDICINE

## 2018-08-10 PROCEDURE — 700111 HCHG RX REV CODE 636 W/ 250 OVERRIDE (IP): Performed by: INTERNAL MEDICINE

## 2018-08-10 PROCEDURE — 94150 VITAL CAPACITY TEST: CPT

## 2018-08-10 PROCEDURE — 82962 GLUCOSE BLOOD TEST: CPT

## 2018-08-10 PROCEDURE — 700102 HCHG RX REV CODE 250 W/ 637 OVERRIDE(OP): Performed by: INTERNAL MEDICINE

## 2018-08-10 PROCEDURE — 94770 HCHG CO2 EXPIRED GAS DETERMINATION: CPT

## 2018-08-10 PROCEDURE — 94640 AIRWAY INHALATION TREATMENT: CPT

## 2018-08-10 PROCEDURE — 80048 BASIC METABOLIC PNL TOTAL CA: CPT

## 2018-08-10 PROCEDURE — 83735 ASSAY OF MAGNESIUM: CPT

## 2018-08-10 PROCEDURE — 770022 HCHG ROOM/CARE - ICU (200)

## 2018-08-10 PROCEDURE — 99291 CRITICAL CARE FIRST HOUR: CPT | Performed by: INTERNAL MEDICINE

## 2018-08-10 PROCEDURE — 700101 HCHG RX REV CODE 250

## 2018-08-10 PROCEDURE — 85025 COMPLETE CBC W/AUTO DIFF WBC: CPT

## 2018-08-10 PROCEDURE — 84100 ASSAY OF PHOSPHORUS: CPT

## 2018-08-10 RX ORDER — CARVEDILOL 3.12 MG/1
3.12 TABLET ORAL 2 TIMES DAILY WITH MEALS
Status: DISCONTINUED | OUTPATIENT
Start: 2018-08-11 | End: 2018-08-14

## 2018-08-10 RX ORDER — LIDOCAINE HYDROCHLORIDE 20 MG/ML
3 INJECTION, SOLUTION INFILTRATION; PERINEURAL PRN
Status: DISCONTINUED | OUTPATIENT
Start: 2018-08-10 | End: 2018-08-12

## 2018-08-10 RX ORDER — BISACODYL 10 MG
10 SUPPOSITORY, RECTAL RECTAL
Status: DISCONTINUED | OUTPATIENT
Start: 2018-08-10 | End: 2018-08-11

## 2018-08-10 RX ORDER — AMOXICILLIN 250 MG
2 CAPSULE ORAL 2 TIMES DAILY
Status: DISCONTINUED | OUTPATIENT
Start: 2018-08-11 | End: 2018-08-11

## 2018-08-10 RX ORDER — FUROSEMIDE 10 MG/ML
40 INJECTION INTRAMUSCULAR; INTRAVENOUS ONCE
Status: COMPLETED | OUTPATIENT
Start: 2018-08-10 | End: 2018-08-10

## 2018-08-10 RX ORDER — CARVEDILOL 3.12 MG/1
3.12 TABLET ORAL 2 TIMES DAILY WITH MEALS
Status: DISCONTINUED | OUTPATIENT
Start: 2018-08-10 | End: 2018-08-10

## 2018-08-10 RX ORDER — LIDOCAINE HYDROCHLORIDE 10 MG/ML
INJECTION, SOLUTION INFILTRATION; PERINEURAL
Status: COMPLETED
Start: 2018-08-10 | End: 2018-08-10

## 2018-08-10 RX ORDER — POLYETHYLENE GLYCOL 3350 17 G/17G
1 POWDER, FOR SOLUTION ORAL
Status: DISCONTINUED | OUTPATIENT
Start: 2018-08-10 | End: 2018-08-11

## 2018-08-10 RX ADMIN — ENOXAPARIN SODIUM 40 MG: 100 INJECTION SUBCUTANEOUS at 17:18

## 2018-08-10 RX ADMIN — POTASSIUM PHOSPHATE, MONOBASIC AND POTASSIUM PHOSPHATE, DIBASIC 15 MMOL: 224; 236 INJECTION, SOLUTION INTRAVENOUS at 08:48

## 2018-08-10 RX ADMIN — PROPOFOL 60 MCG/KG/MIN: 10 INJECTION, EMULSION INTRAVENOUS at 12:54

## 2018-08-10 RX ADMIN — PROPOFOL 50 MCG/KG/MIN: 10 INJECTION, EMULSION INTRAVENOUS at 08:48

## 2018-08-10 RX ADMIN — PROPOFOL 70 MCG/KG/MIN: 10 INJECTION, EMULSION INTRAVENOUS at 21:29

## 2018-08-10 RX ADMIN — IPRATROPIUM BROMIDE AND ALBUTEROL SULFATE 3 ML: .5; 3 SOLUTION RESPIRATORY (INHALATION) at 10:28

## 2018-08-10 RX ADMIN — PROPOFOL 80 MCG/KG/MIN: 10 INJECTION, EMULSION INTRAVENOUS at 19:24

## 2018-08-10 RX ADMIN — IPRATROPIUM BROMIDE AND ALBUTEROL SULFATE 3 ML: .5; 3 SOLUTION RESPIRATORY (INHALATION) at 03:34

## 2018-08-10 RX ADMIN — FUROSEMIDE 40 MG: 10 INJECTION, SOLUTION INTRAMUSCULAR; INTRAVENOUS at 11:47

## 2018-08-10 RX ADMIN — IPRATROPIUM BROMIDE AND ALBUTEROL SULFATE 3 ML: .5; 3 SOLUTION RESPIRATORY (INHALATION) at 18:35

## 2018-08-10 RX ADMIN — CEFTRIAXONE 2 G: 2 INJECTION, POWDER, FOR SOLUTION INTRAMUSCULAR; INTRAVENOUS at 05:01

## 2018-08-10 RX ADMIN — PROPOFOL 60 MCG/KG/MIN: 10 INJECTION, EMULSION INTRAVENOUS at 17:39

## 2018-08-10 RX ADMIN — SODIUM CHLORIDE, POTASSIUM CHLORIDE, SODIUM LACTATE AND CALCIUM CHLORIDE: 600; 310; 30; 20 INJECTION, SOLUTION INTRAVENOUS at 15:38

## 2018-08-10 RX ADMIN — CARVEDILOL 3.12 MG: 3.12 TABLET, FILM COATED ORAL at 17:18

## 2018-08-10 RX ADMIN — LIDOCAINE HYDROCHLORIDE 2 ML: 10 INJECTION, SOLUTION EPIDURAL; INFILTRATION; INTRACAUDAL; PERINEURAL at 08:05

## 2018-08-10 RX ADMIN — PROPOFOL 60 MCG/KG/MIN: 10 INJECTION, EMULSION INTRAVENOUS at 15:37

## 2018-08-10 RX ADMIN — ENOXAPARIN SODIUM 40 MG: 100 INJECTION SUBCUTANEOUS at 05:11

## 2018-08-10 RX ADMIN — IPRATROPIUM BROMIDE AND ALBUTEROL SULFATE 3 ML: .5; 3 SOLUTION RESPIRATORY (INHALATION) at 14:39

## 2018-08-10 RX ADMIN — PROPOFOL 50 MCG/KG/MIN: 10 INJECTION, EMULSION INTRAVENOUS at 05:58

## 2018-08-10 RX ADMIN — IPRATROPIUM BROMIDE AND ALBUTEROL SULFATE 3 ML: .5; 3 SOLUTION RESPIRATORY (INHALATION) at 23:15

## 2018-08-10 RX ADMIN — PROPOFOL 50 MCG/KG/MIN: 10 INJECTION, EMULSION INTRAVENOUS at 01:15

## 2018-08-10 RX ADMIN — FAMOTIDINE 20 MG: 20 TABLET ORAL at 05:10

## 2018-08-10 RX ADMIN — AMIODARONE HYDROCHLORIDE 400 MG: 200 TABLET ORAL at 05:10

## 2018-08-10 RX ADMIN — FAMOTIDINE 20 MG: 20 TABLET ORAL at 17:18

## 2018-08-10 RX ADMIN — CARVEDILOL 3.12 MG: 3.12 TABLET, FILM COATED ORAL at 11:47

## 2018-08-10 RX ADMIN — FENTANYL CITRATE 100 MCG: 50 INJECTION, SOLUTION INTRAMUSCULAR; INTRAVENOUS at 05:07

## 2018-08-10 RX ADMIN — AMIODARONE HYDROCHLORIDE 400 MG: 200 TABLET ORAL at 17:20

## 2018-08-10 RX ADMIN — IPRATROPIUM BROMIDE AND ALBUTEROL SULFATE 3 ML: .5; 3 SOLUTION RESPIRATORY (INHALATION) at 06:36

## 2018-08-10 RX ADMIN — PROPOFOL 50 MCG/KG/MIN: 10 INJECTION, EMULSION INTRAVENOUS at 03:21

## 2018-08-10 RX ADMIN — FENTANYL CITRATE 100 MCG: 50 INJECTION, SOLUTION INTRAMUSCULAR; INTRAVENOUS at 08:51

## 2018-08-10 ASSESSMENT — PULMONARY FUNCTION TESTS: FVC: 1.7

## 2018-08-10 NOTE — CARE PLAN
Problem: Infection  Goal: Will remain free from infection    Intervention: Implement standard precautions and perform hand washing before and after patient contact  Standard precautions observed during patient care      Problem: Bowel/Gastric:  Goal: Normal bowel function is maintained or improved    Intervention: Educate patient and significant other/support system about diet, fluid intake, medications and activity to promote bowel function  Tube feedings started while patient is intubated       Problem: Respiratory:  Goal: Respiratory status will improve    Intervention: Collaborate with respiratory therapist and Interdisciplinary Team on treatment measures to improve respiratory function  Possible trial for extubation on day shift

## 2018-08-10 NOTE — CARE PLAN
Problem: Ventilation Defect:  Goal: Ability to achieve and maintain unassisted ventilation or tolerate decreased levels of ventilator support  Outcome: PROGRESSING AS EXPECTED    Intervention: Support and monitor invasive and noninvasive mechanical ventilation  Adult Ventilation Update    Total Vent Days: 3  APVcmv: 28/430/+10/40% FIO2    Patient Lines/Drains/Airways Status    Active Airway     Name: Placement date: Placement time: Site: Days:    Airway Group ET Tube Oral 7.5 08/07/18      Oral   3              In the last 24 hours, the patient tolerated SBT for 1 hours on settings of 5/10.    #FVC / Vital Capacity (liters) : 1.7 (forced) (08/09/18 0842)  NIF (cm H2O) : -27 (08/09/18 0842)  Rapid Shallow Breathing Index (RR/VT): 35 (08/09/18 0842)  Plateau Pressure (Q Shift): 16 (08/09/18 0712)  Static Compliance (ml / cm H2O): 58 (08/09/18 1445)    Events/Summary/Plan: pt stable on vent.    Intervention: Monitor ventilator weaning response  Daily SBT  Intervention: Perform ventilator associated pneumonia prevention interventions  See VAP flowsheet

## 2018-08-10 NOTE — PROGRESS NOTES
Internal Medicine Interval Note  Note Author: Yris Mabry M.D.     Name Salome Quinones 1960   Age/Sex 58 y.o. female   MRN [id number]   Code Status FULL      After 5PM or if no immediate response to page, please call for cross-coverage  Attending/Team: Dr. Martinez / HUMPHREY Sheehan See Patient List for primary contact information  Call [phone] to page    1st Call - Day Intern (R1):   N/A 2nd Call - Day Sr. Resident (R2/R3):   Dr. Mabry         Reason for interval visit  (Principal Problem)   Severe sepsis (HCC)    Interval Problem Daily Status Update  (24 hours)     ID : This 58 year-old lady with a PMHx of HTN was found unresponsive on the floor by her  on 2018 evening. She felt unwell on , was lethargic and possible headache on 18, denies any specific symptoms, was last found awake and alert on  pm by her . She was found laying on the floor on 8 am and family felt she was sleeping on the floor, still on floor on 8 pm and did not respond which is when family got concerned and called EMS. She was in respiratory distress with RR of 40 when EMS arrived, with /110, Temp of 104 F and blood glucose of 110. She was intubated at the scene. In [facility], she was febrile with temp of 38.8 F, tachycardic with -120. She had leucocytosis of 16.3 on admission with lactate of 2, and scored 4/4 on SIRS and 2 on qSOFA. CT Head did not reveal any acute intracranial abnormality. CT C-spine showed some degenerative changes. CXR revealed bibasilar atelectasis. CTPA was negative for pulmonary embolus but did show some prominent pulmonary vessels in keeping with pulmonary hypertension. A lumbar puncture was performed. CSF showed clear fluid, WBC 36, , with mildly elevated protein of 88. Gram stain and culture negative so far. Blood cultures negative so far. Bronchoscopy revealed yellow secretions bilaterally, BAL was sent for culture. Urine analysis revealed nitrites,  leucocyte esterase, WBC 5-10, nil on gram stain. She was started on ampicillin, ceftriaxone, vancomycin and acyclovir for severe sepsis with possible sources being meningitis vs aspiration pneumonia vs UTI. Procalcitonin elevated at 0.36. Electrolyte abnormalities repleted on admission. CPK elevated at 2390. She went into atrial fibrillation with RVR and was started on amiodarone.     Interval events 8/10/2018 :  - afebrile  - HR 70s, NSR following amio, now on PO amio  - -160s  - continues to remain on vent support, having SBTs, agitated this am when on SBT  - remains on propofol  - not on vasopressors  - renal function improved  - moving extremities, responding +ve toe movement and squeezing fingers on request  - Strept pneumo in BAL and in CSF  - HSV PCR negative  - Dr. Kiran, ID on board  - to treat as pneumonia and meningitis  - UOP ~1100 mls past 24 hrs, +ve balance since admission  - TF via CorTrak  - CXR : atelectasis ++ Left    Plan  - continue ceftriaxone to 2 gms BID (increased on 8/9)  - appreciate ID recs  - increase Coreg to 3.125 mg BID  - Lasix 40 mg IV once  - Remove BMS  - Increase PEEP to 12  - continue PO amiodarone   - continue IVF, CorTrak and TF  - prophylactic Lovenox BID in view of weight/BMI  - not for NOAC/coumadin at present      Review of Systems   Unable to perform ROS: Intubated       Consultants/Specialty  PMA - Dr. Martinez  ID - Dr. Kiran  PCP: @PCP    Disposition  ICU    Quality Measures  Quality-Core Measures   Reviewed items::  EKG reviewed, Medications reviewed, Labs reviewed and Radiology images reviewed  Raygoza catheter::  Critically Ill - Requiring Accurate Measurement of Urinary Output  Central line in place:  Sepsis  DVT prophylaxis pharmacological::  Enoxaparin (Lovenox)  DVT prophylaxis - mechanical:  SCDs  Ulcer Prophylaxis::  Yes  Antibiotics:  Treating active infection/contamination beyond 24 hours perioperative coverage          Physical Exam       Vitals:     08/10/18 0500 08/10/18 0600 08/10/18 0637 08/10/18 0749   Pulse: 74 67     Resp: 17 (!) 28     Temp:       SpO2: 100% 97% 99% 100%   Weight:       Height:         Body mass index is 42.31 kg/m². Weight: (!) 137.6 kg (303 lb 5.7 oz)  Oxygen Therapy:  Pulse Oximetry: 100 %, FiO2%: 40 %, O2 Delivery: Ventilator    Physical Exam   Constitutional: She is well-developed, well-nourished, and in no distress. No distress.   Intubated and ventilated   HENT:   Head: Normocephalic and atraumatic.   Facial flushing   Eyes: Conjunctivae are normal. No scleral icterus.   Neck: No JVD present.   Cardiovascular:   irregularly irregular   Pulmonary/Chest: She has no wheezes.   Intubated and ventilated   Abdominal: Soft. Bowel sounds are normal. She exhibits no distension. There is no tenderness. There is no rebound and no guarding.   Musculoskeletal:   Hyperkeratinization B/L LE   Lymphadenopathy:     She has no cervical adenopathy.   Neurological:   Intubated   Skin: She is not diaphoretic. There is erythema.   Flushing of skin noted on back/LE         Lab Data Review:         8/8/2018  11:03 AM    Recent Labs      08/08/18   0800 08/09/18   0344  08/10/18   0325   SODIUM  139  138  143   POTASSIUM  3.8  4.4  3.8   CHLORIDE  109  109  110   CO2  24  22  23   BUN  34*  27*  24*   CREATININE  1.47*  0.88  0.78   MAGNESIUM  2.5  2.2  2.0   PHOSPHORUS  3.7  2.9  2.0*   CALCIUM  8.2*  8.0*  7.9*       Recent Labs      08/07/18   2130  08/08/18   0800 08/09/18   0344  08/10/18   0325   ALTSGPT  33   --    --    --    ASTSGOT  61*   --    --    --    ALKPHOSPHAT  68   --    --    --    TBILIRUBIN  0.9   --    --    --    GLUCOSE  121*  116*  136*  89       Recent Labs      08/08/18   0800 08/09/18   0344  08/10/18   0325   RBC  4.75  4.55  4.12*   HEMOGLOBIN  13.8  13.5  12.2   HEMATOCRIT  43.6  41.3  37.2   PLATELETCT  183  158*  163*       Recent Labs      08/07/18   2130  08/08/18   0800  08/09/18   0344  08/10/18   0325   WBC   --    15.2*  9.9  9.5   NEUTSPOLYS   --   63.90  82.30*  65.00   LYMPHOCYTES   --   19.00*  10.30*  25.30   MONOCYTES   --   16.20*  7.00  8.90   EOSINOPHILS   --   0.00  0.00  0.30   BASOPHILS   --   0.50  0.10  0.20   ASTSGOT  61*   --    --    --    ALTSGPT  33   --    --    --    ALKPHOSPHAT  68   --    --    --    TBILIRUBIN  0.9   --    --    --            Assessment/Plan     * Severe sepsis (HCC)   Assessment & Plan    - Admitted following found unresponsive on the floor on 8/7/18 night with temp 104 F, /100 and blood glucose 110, RR 40 and trismus, intubated by EMS on the scene after fentanyl, rocuronium, versed, ketamine, 2 lts IVF  - per family ( and son), pt was unwell since Sunday 8/5 pm, was last seen awake and alert on 8/6 pm, c/o headache but no other symptoms,  saw her on floor on 8/6 night and 8/7 am and thought she was sleeping on floor, did not wake / respond 8/7 evening which is when family were concerned and called EMS.   - In ER, , /117, temp 38.8 C with WBC 16.3, lactate was 2 on admission. WBC down to 15.3 on 8/8/18, lactate down to 1.8  - SIRS 4/4, qSOFA 2 on admission  - CXR initial 8/7 : LLL atelectasis, intubated into right bronchus, ETT repositioned. CXR 8/8/18 - Bibasilar atelectasis, ETT in trachea  - UA : nitrites +ve, small LE, WBC 5-10, negative for bacteria.   - CT Head 8/7/18 : nil acute  - CTPA 8/7/18 : no PE, prominent pulm vessels in keeping with pHTN, possible PNA  LLL on review of images  - CT spine 8/7/18 : DJD, no fracture  - LP 8/7 : clear CSF, WBC 36, , protein 88, glucose 40, HSV in process  - Bronchoscopy : bilateral yellow secretions  - procal 0.36  - not on pressors  - reviewed by Dr. Kiran 8/8/2018  - BAL +ve for strept pneumo, CSF positive for strept pneumo  - ampicillin and vancomycin discontinued 8/8 per ID recs  - Update 8/10/18 : being treated as strept pneumo PNA and meningitis with rocephine 2 gms BID. More agitated when off  precedex.  mls overnight, +ve fluid balance since admission, SBP 130s-160s, HSV PCR negative  Imp : Severe sepsis secondary to pneumonia and meningitis    Plan  - continue rocephine to 2 gms BID  - appreciate ID recs  - Lasix 40 IV once  - Remove BMS  - d/c CPK checks  -  ml/hr  - follow-up WNV and final cultures  - CorTrak with TF          Encephalopathy secondary to meningitis and hypoxia from pneumonia   Assessment & Plan    - found unresponsive on the floor on 8/7/18 night  - tylenol level < 10, BAL 0, salicylate 0  - CT Head - nil acute  - CT spine - no fracture  - CSF 8/7 : WBC 36, Protein 88, else wnl, nil on gram stain  - could be due to severe sepsis, potential source - pulmonary vs viral meningeal vs urinary  - currently intubated and ventilated  - had a dose of dexamethasone on 8/8/18  - on ceftriaxone and acyclovir  - Dr. Kiran, ID on board  - Strep pneumo in BAL and CSF  - to treat as PNA and meningitis from pneumococcus    Plan  -  continue rocephine to 2 gms BID  - HSV PCR negative, acyclovir discontinued  - a/w WNV serology and final cultures        Streptococcal pneumonia (HCC)   Assessment & Plan    - could be possible source of sepsis given yellow secretions on bronchoscopy.  - on rocephine, IVF  - intubated, continue vent support per PMA and RT  - DUONEBS        Acute hypoxemic respiratory failure (HCC)   Assessment & Plan    - Intubated by EMS due to tachypnea, respiratory and metabolic acidosis on initial ABG, improved this am  - on PEEP 10, FiO2 40%  - Duonebs  - on rocephine for possible pneumonia  - CXR 8/10 : increase atelectasis Lt LL    Plan  - increase PEEP  - Lasix 40 mg IV once  - continue vent settings per PMA, RT        Atrial fibrillation with RVR (HCC)   Assessment & Plan    - new onset this admission in the context of sepsis  - [id number]  2  - continue PO amiodarone  - not for anticoagulation        Hyperglycemia   Assessment & Plan    - mild 121 8/7, expected in  the current septic phase  - No known history of diabetes.    - A1c April 2017 was 5.5  - BG at scene by EMS was 110  - insulin sliding scale        Atelectasis   Assessment & Plan    - Documented on CXR, worse LLL atelectasis 8/10  - increase PEEP to 12  - one dose of lasix 40 mg        Abnormal urinalysis   Assessment & Plan    - U/A on 8/7/2018 : nitrite positive, small amount leucocyte esterase, WBC 5-10, no bacteria  - await culture  - on rocephine        Elevated troponin   Assessment & Plan    - On admission trop 0.12.  EKG no st/t changes  - Trop 0.15 on 8/9, on amiodarone for new AFib RVR, in NSR this am  - no further trends unless clinically warranted        Rhabdomyolysis   Assessment & Plan    - Patient found down unresponsive.   - CPK on admission 2390   - BUN/Creat 28/1.29, eGFR 43, low K at 3.1  - Rpt CPK on 8/9 is 2390, and on 8/10 down to 1061  - IVF @ 125  - stop trending CPK        Hypocalcemia   Assessment & Plan    - adjusted calcium was 6.46 on admission 8/7  - had 1 gm calcium gluconate and 1 gm calcium chloride since admission  - 8/9/18, ionized calcium 1.1  - ctm and replete as needed          Hypomagnesemia   Assessment & Plan    - ctm and aim Mag > 2        Hypokalemia   Assessment & Plan    - ctm and replete to aim K>4        Pulmonary hypertension (HCC)   Assessment & Plan    - CTPA showed prominent pulmonary vessels suggesting pulmonary hypertension   - TTE on 8/8/2018 : normal LV systolic function, EF 60%        Essential hypertension- (present on admission)   Assessment & Plan    - Home meds :  hydralazine, carvedilol, losartan  - not on pressors at present  - Start coreg at reduced dose of 3.125 mg BID

## 2018-08-10 NOTE — PROGRESS NOTES
Patient seen in f/u this afternoon.  HPI: This is a 58-year-old smoker who presented with  fever, confusion, lethargy and basically became   unresponsive with no localizing symptoms at all, such as cough, diarrhea, or   anything to suggest possible sources of infection in her.  The patient has a bronchoscopy showing a lot of   gram-positive cocci, and subsequently grew S. Pneumoniae. This was also found in her CSF culture  She is more alert today, grimacing , moving her eyes and appears responsive. Fever coming down, remains intubated    PMH- sleep apnea on mask and smoking     Meds- iv ceftriaxone 2 grams every 12 hours amiodarone, propofol      Discontinued- acyclovir, ampicillin, vancomycin    ROS- unable to obtain due to sedation /intubation   Has been noted by RN to have increasing red rash on front of chest     PHYSICAL EXAMINATION:  GENERAL:  She is intubated and sedated, but does move her head when we move   her legs and is  responding to her family some response.  She is afebrile. P75, NSR. SBP -130s  40% FiO2     NECK: still   Minimally rigid, but otherwise seems normal.    Her conjunctivae are   somewhat swollen and red.  LUNGS:  Clear.bilaterally   HEART:  Reveals no murmur.  ABDOMEN:  Belly exam is unremarkable.  I cannot palpate any organs.  EXTREMITIES:  Again, no petechiae or ecchymoses, etc.  She has a negative   Kernig's and neurologically she is  sedated   Skin- faint erythematous  Rash on anterior chest , not on abdomen     CSF    Site   TAP    CSF Culture   Rare growth Gram positive cocci. Further information to   follow.     Gram Stain Result   Results for LIBBY RIGGINS (MRN [id number]) as of 8/9/2018 21:07   Ref. Range 8/9/2018 03:44   WBC Latest Ref Range: 4.8 - 10.8 K/uL 9.9   RBC Latest Ref Range: 4.20 - 5.40 M/uL 4.55   Hemoglobin Latest Ref Range: 12.0 - 16.0 g/dL 13.5   Hematocrit Latest Ref Range: 37.0 - 47.0 % 41.3   MCV Latest Ref Range: 81.4 - 97.8 fL 90.8   MCH Latest Ref Range:  27.0 - 33.0 pg 29.7   MCHC Latest Ref Range: 33.6 - 35.0 g/dL 32.7 (L)   RDW Latest Ref Range: 35.9 - 50.0 fL 50.6 (H)   Platelet Count Latest Ref Range: 164 - 446 K/uL 158 (L)       Quantitative Bronch Washing  (Abnormal)      Streptococcus pneumoniae   20,000 cfu/mL          Assess:  1. pneumococcal pna (has LLL consolidation on CT scan) with apparent meningitis - CSF final cultures pending   Appears more responsive and stable today  2. Encephalopathy- resolving /from stress/meningitis    REC: continue ceftriaxone 2 grams BID

## 2018-08-10 NOTE — PROGRESS NOTES
Updated CSF micro result suggests light growth of coag neg staph on culture with no organisms on gram stain. Initial LP suggested 34 WBC with pleocytosis. Resp culture still shows strep pneumo. Discussed with Dr. Kiran. Advised to reduce ceftriaxone to 2 gms once/day.

## 2018-08-11 ENCOUNTER — APPOINTMENT (OUTPATIENT)
Dept: RADIOLOGY | Facility: MEDICAL CENTER | Age: 58
DRG: 853 | End: 2018-08-11
Attending: INTERNAL MEDICINE

## 2018-08-11 LAB
ANION GAP SERPL CALC-SCNC: 9 MMOL/L (ref 0–11.9)
BACTERIA BRONCH AEROBE CULT: ABNORMAL
BACTERIA CSF CULT: ABNORMAL
BACTERIA CSF CULT: ABNORMAL
BASOPHILS # BLD AUTO: 0.5 % (ref 0–1.8)
BASOPHILS # BLD: 0.04 K/UL (ref 0–0.12)
BUN SERPL-MCNC: 22 MG/DL (ref 8–22)
CA-I SERPL-SCNC: 1.1 MMOL/L (ref 1.1–1.3)
CALCIUM SERPL-MCNC: 8.4 MG/DL (ref 8.5–10.5)
CHLORIDE SERPL-SCNC: 108 MMOL/L (ref 96–112)
CO2 SERPL-SCNC: 26 MMOL/L (ref 20–33)
CREAT SERPL-MCNC: 0.82 MG/DL (ref 0.5–1.4)
EOSINOPHIL # BLD AUTO: 0.11 K/UL (ref 0–0.51)
EOSINOPHIL NFR BLD: 1.4 % (ref 0–6.9)
ERYTHROCYTE [DISTWIDTH] IN BLOOD BY AUTOMATED COUNT: 52.2 FL (ref 35.9–50)
GLUCOSE BLD-MCNC: 92 MG/DL (ref 65–99)
GLUCOSE SERPL-MCNC: 90 MG/DL (ref 65–99)
GRAM STN SPEC: ABNORMAL
GRAM STN SPEC: ABNORMAL
HCT VFR BLD AUTO: 39.9 % (ref 37–47)
HGB BLD-MCNC: 12.9 G/DL (ref 12–16)
IMM GRANULOCYTES # BLD AUTO: 0.05 K/UL (ref 0–0.11)
IMM GRANULOCYTES NFR BLD AUTO: 0.6 % (ref 0–0.9)
LYMPHOCYTES # BLD AUTO: 2.99 K/UL (ref 1–4.8)
LYMPHOCYTES NFR BLD: 38.6 % (ref 22–41)
MAGNESIUM SERPL-MCNC: 1.9 MG/DL (ref 1.5–2.5)
MCH RBC QN AUTO: 29.6 PG (ref 27–33)
MCHC RBC AUTO-ENTMCNC: 32.3 G/DL (ref 33.6–35)
MCV RBC AUTO: 91.5 FL (ref 81.4–97.8)
MONOCYTES # BLD AUTO: 0.76 K/UL (ref 0–0.85)
MONOCYTES NFR BLD AUTO: 9.8 % (ref 0–13.4)
NEUTROPHILS # BLD AUTO: 3.79 K/UL (ref 2–7.15)
NEUTROPHILS NFR BLD: 49.1 % (ref 44–72)
NRBC # BLD AUTO: 0 K/UL
NRBC BLD-RTO: 0 /100 WBC
PHOSPHATE SERPL-MCNC: 3.7 MG/DL (ref 2.5–4.5)
PLATELET # BLD AUTO: 192 K/UL (ref 164–446)
PMV BLD AUTO: 10.6 FL (ref 9–12.9)
POTASSIUM SERPL-SCNC: 3.4 MMOL/L (ref 3.6–5.5)
RBC # BLD AUTO: 4.36 M/UL (ref 4.2–5.4)
SIGNIFICANT IND 70042: ABNORMAL
SIGNIFICANT IND 70042: ABNORMAL
SITE SITE: ABNORMAL
SITE SITE: ABNORMAL
SODIUM SERPL-SCNC: 143 MMOL/L (ref 135–145)
SOURCE SOURCE: ABNORMAL
SOURCE SOURCE: ABNORMAL
WBC # BLD AUTO: 7.7 K/UL (ref 4.8–10.8)

## 2018-08-11 PROCEDURE — 36600 WITHDRAWAL OF ARTERIAL BLOOD: CPT

## 2018-08-11 PROCEDURE — 770022 HCHG ROOM/CARE - ICU (200)

## 2018-08-11 PROCEDURE — 700102 HCHG RX REV CODE 250 W/ 637 OVERRIDE(OP): Performed by: STUDENT IN AN ORGANIZED HEALTH CARE EDUCATION/TRAINING PROGRAM

## 2018-08-11 PROCEDURE — 700111 HCHG RX REV CODE 636 W/ 250 OVERRIDE (IP): Performed by: STUDENT IN AN ORGANIZED HEALTH CARE EDUCATION/TRAINING PROGRAM

## 2018-08-11 PROCEDURE — 83735 ASSAY OF MAGNESIUM: CPT

## 2018-08-11 PROCEDURE — 71045 X-RAY EXAM CHEST 1 VIEW: CPT

## 2018-08-11 PROCEDURE — 85025 COMPLETE CBC W/AUTO DIFF WBC: CPT

## 2018-08-11 PROCEDURE — 700105 HCHG RX REV CODE 258: Performed by: INTERNAL MEDICINE

## 2018-08-11 PROCEDURE — 80048 BASIC METABOLIC PNL TOTAL CA: CPT

## 2018-08-11 PROCEDURE — 94640 AIRWAY INHALATION TREATMENT: CPT

## 2018-08-11 PROCEDURE — 99291 CRITICAL CARE FIRST HOUR: CPT | Performed by: INTERNAL MEDICINE

## 2018-08-11 PROCEDURE — 700105 HCHG RX REV CODE 258: Performed by: STUDENT IN AN ORGANIZED HEALTH CARE EDUCATION/TRAINING PROGRAM

## 2018-08-11 PROCEDURE — 700111 HCHG RX REV CODE 636 W/ 250 OVERRIDE (IP): Performed by: INTERNAL MEDICINE

## 2018-08-11 PROCEDURE — 94003 VENT MGMT INPAT SUBQ DAY: CPT

## 2018-08-11 PROCEDURE — 82962 GLUCOSE BLOOD TEST: CPT | Mod: 91

## 2018-08-11 PROCEDURE — 82803 BLOOD GASES ANY COMBINATION: CPT

## 2018-08-11 PROCEDURE — 82330 ASSAY OF CALCIUM: CPT

## 2018-08-11 PROCEDURE — 700101 HCHG RX REV CODE 250: Performed by: INTERNAL MEDICINE

## 2018-08-11 PROCEDURE — 700102 HCHG RX REV CODE 250 W/ 637 OVERRIDE(OP): Performed by: INTERNAL MEDICINE

## 2018-08-11 PROCEDURE — 84100 ASSAY OF PHOSPHORUS: CPT

## 2018-08-11 PROCEDURE — A9270 NON-COVERED ITEM OR SERVICE: HCPCS | Performed by: STUDENT IN AN ORGANIZED HEALTH CARE EDUCATION/TRAINING PROGRAM

## 2018-08-11 PROCEDURE — A9270 NON-COVERED ITEM OR SERVICE: HCPCS | Performed by: INTERNAL MEDICINE

## 2018-08-11 RX ORDER — POTASSIUM CHLORIDE 14.9 MG/ML
20 INJECTION INTRAVENOUS ONCE
Status: COMPLETED | OUTPATIENT
Start: 2018-08-11 | End: 2018-08-11

## 2018-08-11 RX ORDER — MAGNESIUM SULFATE 1 G/100ML
1 INJECTION INTRAVENOUS ONCE
Status: COMPLETED | OUTPATIENT
Start: 2018-08-11 | End: 2018-08-11

## 2018-08-11 RX ADMIN — IPRATROPIUM BROMIDE AND ALBUTEROL SULFATE 3 ML: .5; 3 SOLUTION RESPIRATORY (INHALATION) at 19:07

## 2018-08-11 RX ADMIN — ENOXAPARIN SODIUM 40 MG: 100 INJECTION SUBCUTANEOUS at 05:17

## 2018-08-11 RX ADMIN — IPRATROPIUM BROMIDE AND ALBUTEROL SULFATE 3 ML: .5; 3 SOLUTION RESPIRATORY (INHALATION) at 03:11

## 2018-08-11 RX ADMIN — PROPOFOL 60 MCG/KG/MIN: 10 INJECTION, EMULSION INTRAVENOUS at 04:28

## 2018-08-11 RX ADMIN — PROPOFOL 50 MCG/KG/MIN: 10 INJECTION, EMULSION INTRAVENOUS at 06:26

## 2018-08-11 RX ADMIN — PROPOFOL 50 MCG/KG/MIN: 10 INJECTION, EMULSION INTRAVENOUS at 21:24

## 2018-08-11 RX ADMIN — PROPOFOL 70 MCG/KG/MIN: 10 INJECTION, EMULSION INTRAVENOUS at 00:18

## 2018-08-11 RX ADMIN — CEFTRIAXONE 2 G: 2 INJECTION, POWDER, FOR SOLUTION INTRAMUSCULAR; INTRAVENOUS at 05:17

## 2018-08-11 RX ADMIN — CALCIUM GLUCONATE 1 G: 94 INJECTION, SOLUTION INTRAVENOUS at 10:35

## 2018-08-11 RX ADMIN — PROPOFOL 50 MCG/KG/MIN: 10 INJECTION, EMULSION INTRAVENOUS at 14:35

## 2018-08-11 RX ADMIN — FAMOTIDINE 20 MG: 20 TABLET ORAL at 17:50

## 2018-08-11 RX ADMIN — IPRATROPIUM BROMIDE AND ALBUTEROL SULFATE 3 ML: .5; 3 SOLUTION RESPIRATORY (INHALATION) at 22:53

## 2018-08-11 RX ADMIN — PROPOFOL 50 MCG/KG/MIN: 10 INJECTION, EMULSION INTRAVENOUS at 19:06

## 2018-08-11 RX ADMIN — POTASSIUM BICARBONATE 50 MEQ: 25 TABLET, EFFERVESCENT ORAL at 08:01

## 2018-08-11 RX ADMIN — POTASSIUM CHLORIDE 20 MEQ: 200 INJECTION, SOLUTION INTRAVENOUS at 08:01

## 2018-08-11 RX ADMIN — PROPOFOL 50 MCG/KG/MIN: 10 INJECTION, EMULSION INTRAVENOUS at 16:42

## 2018-08-11 RX ADMIN — IPRATROPIUM BROMIDE AND ALBUTEROL SULFATE 3 ML: .5; 3 SOLUTION RESPIRATORY (INHALATION) at 10:04

## 2018-08-11 RX ADMIN — PROPOFOL 50 MCG/KG/MIN: 10 INJECTION, EMULSION INTRAVENOUS at 09:21

## 2018-08-11 RX ADMIN — FENTANYL CITRATE 25 MCG/HR: 50 INJECTION, SOLUTION INTRAMUSCULAR; INTRAVENOUS at 11:00

## 2018-08-11 RX ADMIN — FAMOTIDINE 20 MG: 20 TABLET ORAL at 05:16

## 2018-08-11 RX ADMIN — MAGNESIUM SULFATE IN DEXTROSE 1 G: 10 INJECTION, SOLUTION INTRAVENOUS at 09:21

## 2018-08-11 RX ADMIN — AMIODARONE HYDROCHLORIDE 400 MG: 200 TABLET ORAL at 05:15

## 2018-08-11 RX ADMIN — PROPOFOL 60 MCG/KG/MIN: 10 INJECTION, EMULSION INTRAVENOUS at 01:44

## 2018-08-11 RX ADMIN — IPRATROPIUM BROMIDE AND ALBUTEROL SULFATE 3 ML: .5; 3 SOLUTION RESPIRATORY (INHALATION) at 06:25

## 2018-08-11 RX ADMIN — ENOXAPARIN SODIUM 40 MG: 100 INJECTION SUBCUTANEOUS at 17:50

## 2018-08-11 RX ADMIN — IPRATROPIUM BROMIDE AND ALBUTEROL SULFATE 3 ML: .5; 3 SOLUTION RESPIRATORY (INHALATION) at 14:27

## 2018-08-11 RX ADMIN — PROPOFOL 50 MCG/KG/MIN: 10 INJECTION, EMULSION INTRAVENOUS at 11:51

## 2018-08-11 RX ADMIN — PROPOFOL 50 MCG/KG/MIN: 10 INJECTION, EMULSION INTRAVENOUS at 23:56

## 2018-08-11 NOTE — CARE PLAN
Problem: Ventilation Defect:  Goal: Ability to achieve and maintain unassisted ventilation or tolerate decreased levels of ventilator support  Outcome: PROGRESSING SLOWER THAN EXPECTED    Intervention: Support and monitor invasive and noninvasive mechanical ventilation  Adult Ventilation Update    Total Vent Days: 4  APVcmv: 28/430/+12/40% FIO2    Patient Lines/Drains/Airways Status    Active Airway     Name: Placement date: Placement time: Site: Days:    Airway Group ET Tube Oral 7.5 08/07/18      Oral   4              In the last 24 hours, the patient tolerated SBT for 1 hour on settings of 5/10.    #FVC / Vital Capacity (liters) : 1.7 (08/10/18 0749)  NIF (cm H2O) : -34 (08/10/18 0749)  Rapid Shallow Breathing Index (RR/VT): 52 (08/10/18 0749)  Plateau Pressure (Q Shift): 19 (08/10/18 1028)  Static Compliance (ml / cm H2O): 73 (08/10/18 1440)      Events/Summary/Plan: pt remains stable on vent. no changes at this time. no SBT due to PEEP of 12 (08/10/18 1440)

## 2018-08-11 NOTE — PROGRESS NOTES
Pulmonary Critical Care Progress Note        Chief Complaint: Respiratory failure    History of Present Illness: I was kindly asked to see and evaluate Salome Quinones, a 58 y.o. female for evaluation and management of the above problem.     The entire history is obtained from healthcare providers and the medical record as this lady cannot give me any history.  This lady has a history of hypertension and sleep apnea.  Apparently she was sick on Sunday.  Monday she was hot and diaphoretic and did not feel good.  She declined to go to the hospital at that time.  Monday night she slept on the couch and apparently fell onto the floor during the night.  This morning she was on the floor, but her family felt that she was sleeping.  This evening, EMS was activated when the family realized that she had not moved from the floor all day.  There was evidence of emesis at the scene.  EMS found the patient have a temperature of 104°F and she was breathing 40 times per minute.  She was intubated and transported to [facility].    Please note above history was obtained by my ICU colleague Dr. Ying.     ROS:  Respiratory: unable to perform due to the patient's inability to effectively communicate, Cardiac: unable to perform due to the patient's inability to effectively communicate, GI: unable to perform due to the patient's inability to effectively communicate.  All other systems negative.    Interval Events:  24 hour interval history reviewed    -On 7th and found down  -AMS and mild rhabdo  -On propofol and RASS +4 earlier today  -PO amiodarone and tolerating  -BMS small output and therefore remove  -Raygoza with +  Fluid balance  -CVP  -LR at 50 weaning down  -Day 4 28, PEEP 10 40%  -NIF -34  -Rocephin for strept pneumonia, and meningitis  -Coreg 3.125  -PEEP to 12 due to ongoing infiltrates   -KVO  -Lasix 40 mg x 1     PFSH:  No change.    Respiratory:  Kenney Vent Mode: APVCMV, Rate (breaths/min): 28, Vt Target (mL): 430,  PEEP/CPAP: 12, FiO2: 40, Static Compliance (ml / cm H2O): 71, Control VTE (exp VT): 344  Pulse Oximetry: 99 %  Chest Tube Drains:          Exam: rhonchi bibasilar  ImagingCXR  I have personally reviewed the chest x-ray my impression is   there continues to be right sided CVP in position. Patient continues to have significant infiltrates slightly worse today on the left compared to right. There is more atelectasis noted that his plate-like There is also right sided hemidiaphragm elevation  Recent Labs      08/08/18   0545  08/09/18   0457  08/10/18   0456   ISTATAPH  7.376*  7.426  7.439   ISTATAPCO2  36.1  34.3  34.2   ISTATAPO2  60*  85  88*   ISTATATCO2  22  24  24   [id number]  90*  97  97   ISTATARTHCO3  21.1  22.5  23.1   ISTATARTBE  -3  -1  -1   ISTATTEMP  39.1 C  36.2 C  37.1 C   ISTATFIO2  80  40  40   ISTATSPEC  Arterial  Arterial  Arterial   ISTATAPHTC  7.345*  7.438  7.437   [id number]  70  81  89*       HemoDynamics:  Pulse: 71, Heart Rate (Monitored): 62  NIBP: (!) 161/82       Exam: Irregularly irregular rhythm on oral amiodarone and rate controlled at this time. Patient at LR at 50 mL per hour.  Imaging: echo Reviewed   8/8/2018  Transthoracic  Echo Report      Echocardiography Laboratory    CONCLUSIONS  No prior study is available for comparison.   Normal left ventricular systolic function.  Left ventricular ejection fraction is visually estimated to be 60%.  Grade I diastolic dysfunction.  The right ventricle was normal in size and function.  No significant valve disease or flow abnormalities.   Recent Labs      08/07/18   2034  08/07/18   2130  08/09/18   0344  08/10/18   0325   CPKTOTAL   --   2390*  2390*  1061*   TROPONINI  0.12*   --   0.15*   --        Neuro:  GCS Total Rama Coma Score: 10       Exam: Heavily sedated. Patient remains on propofol infusion, and with sedation hold, agitation remains. She withdraws to painful stimulus. Reflexes are equal upper and lower extremities. She has  downgoing plantar reflexes. I do not detect obvious nuchal rigidity on exam. She is slightly more alert today with decrease in sedation.    Lumbar puncture data from 8/8/2018 at 120 in the a.m.:    Results for LIBBY RIGGINS (MRN [id number]) as of 8/8/2018 18:53   Ref. Range 8/8/2018 01:20   Number Of Tubes Unknown 4   Volume Latest Units: mL 3.0   Color-Body Fluid Unknown Colorless   Character-Body Fluid Unknown Clear   Total WBC Count Latest Ref Range: 0 - 10 cells/uL 36 (H)   Total RBC Count Latest Units: cells/uL 187   Crenated RBC Latest Units: % 0   Polys Latest Units: % 60   Lymphs Latest Units: % 33   Mononuclear Cells - CSF Latest Units: % 4   Unidentified Cells - CSF Latest Units: % 3   CSF Nucleated Red Blood Cell Unknown 1   CSF Tube Number Unknown 3   Glucose CSF Latest Ref Range: 40 - 80 mg/dL 79   Total Protein, CSF Latest Ref Range: 15 - 45 mg/dL 88 (H)     Imaging: Available data reviewed.  Impression       1.  No acute intracranial abnormality.  2.  LEFT parietal scalp swelling.  3.  Chronic paranasal sinus disease.     Fluids:  Intake/Output       08/08/18 0700 - 08/09/18 0659 08/09/18 0700 - 08/10/18 0659 08/10/18 0700 - 08/11/18 0659      6256-9331 4800-2611 Total 0645-5889 9819-3137 Total 9097-6027 2722-0861 Total       Intake    I.V.  80  869.8 949.8  652  895.6 1547.6  430  -- 430    Amiodarone Volume -- 185 185 102 -- 102 -- -- --    Propofol Volume 80 184.8 264.8 150 595.6 745.6 430 -- 430    IV Volume (LR) -- 500 500 400 300 700 -- -- --    Enteral  --  -- --  --  300 300  450  -- 450    Intake (mL) (Enteral Tube Left Nare Cortrak Gastric Feeding Tube) -- -- -- -- 300 300 450 -- 450    Total Intake 80 869.8 949.8 652 1195.6 1847.6 880 -- 880       Output    Urine  1150  650 1800  500  570 1070  2375  -- 2375    Output (mL) (Urinary Catheter Indwelling Catheter) 3608 570 7117  2375 -- 2375    Drains  --  -- --  --  0 0  0  -- 0    Residual Amount (ml) (Discarded) -- -- -- -- 0 0  0 -- 0    Stool  --  125 125  150  100 250  100  -- 100    Number of Times Stooled 1 x -- 1 x -- -- -- 1 x -- 1 x    Output (mL) (Rectal Tube Group Bowel Management System) -- 125 125 150 100 250 100 -- 100    Total Output 1068 136 7789  2475 -- 2475       Net I/O     -1070 94.8 -975.2 2 525.6 527.6 -1595 -- -1595        Weight: (!) 137.6 kg (303 lb 5.7 oz)  Recent Labs      18   0800  18   0344  08/10/18   0325   SODIUM  139  138  143   POTASSIUM  3.8  4.4  3.8   CHLORIDE  109  109  110   CO2  24  22  23   BUN  34*  27*  24*   CREATININE  1.47*  0.88  0.78   MAGNESIUM  2.5  2.2  2.0   PHOSPHORUS  3.7  2.9  2.0*   CALCIUM  8.2*  8.0*  7.9*       GI/Nutrition:  Exam: abdomen is soft and non-tender. Patient is morbidly obese. Scars are present on surgery noted.  Imaging: None - Reviewed  NPO  Liver Function  Recent Labs      18   2130  18   0818   0344  08/10/18   0325   ALTSGPT  33   --    --    --    ASTSGOT  61*   --    --    --    ALKPHOSPHAT  68   --    --    --    TBILIRUBIN  0.9   --    --    --    GLUCOSE  121*  116*  136*  89       Heme:  Recent Labs      18   0818   0344  08/10/18   0325   RBC  4.75  4.55  4.12*   HEMOGLOBIN  13.8  13.5  12.2   HEMATOCRIT  43.6  41.3  37.2   PLATELETCT  183  158*  163*       Infectious Disease:  Monitored Temp 2  Av.3 °C (99.1 °F)  Min: 37.1 °C (98.8 °F)  Max: 37.6 °C (99.7 °F)  Micro: antibiotics reviewed. Streptococcal pneumonia noted in both CSF initially but 2nd report was coag-negative staph. Patient does have streptococcal pneumonia in the sputum. Patient on meningeal doses of vancomycin, Rocephin and acyclovir, and ampicillin discontinued by infectious disease as she does not have Listeria initially,   however now with streptococcal pneumonia from sputum/BAL and coag-negative staph from CSF.     Rocephin as single antimicrobial agent and meningeal doses to treat pneumonia and meningitis, though  coag-negative staph is now grown from CSF.    MRI of the brain not required as this was being considered for possible sign of herpes encephalitis of the temporal region as a cause of possible seizure activity and a positive CSF.    Steroids were also given yesterday since steroids were not given initially, but no need for further steroids at this time.    Peripheral blood cultures remain negative at this time.     Recent Labs      08/07/18   2130  08/08/18   0800  08/09/18   0344  08/10/18   0325   WBC   --   15.2*  9.9  9.5   NEUTSPOLYS   --   63.90  82.30*  65.00   LYMPHOCYTES   --   19.00*  10.30*  25.30   MONOCYTES   --   16.20*  7.00  8.90   EOSINOPHILS   --   0.00  0.00  0.30   BASOPHILS   --   0.50  0.10  0.20   ASTSGOT  61*   --    --    --    ALTSGPT  33   --    --    --    ALKPHOSPHAT  68   --    --    --    TBILIRUBIN  0.9   --    --    --      Current Facility-Administered Medications   Medication Dose Frequency Provider Last Rate Last Dose   • lidocaine (XYLOCAINE) 2 % injection 3 mL  3 mL PRN Remi Martinez D.O.       • [START ON 8/11/2018] cefTRIAXone (ROCEPHIN) 2 g in  mL IVPB  2 g Q24HRS Yris Mabry M.D.       • [START ON 8/11/2018] carvedilol (COREG) tablet 3.125 mg  3.125 mg BID WITH MEALS Taz Castro M.D.       • magnesium hydroxide (MILK OF MAGNESIA) suspension 30 mL  30 mL QDAY PRN Taz Castro M.D.        And   • [START ON 8/11/2018] senna-docusate (PERICOLACE or SENOKOT S) 8.6-50 MG per tablet 2 Tab  2 Tab BID Taz Castro M.D.        And   • polyethylene glycol/lytes (MIRALAX) PACKET 1 Packet  1 Packet QDAY PRN Taz Castro M.D.        And   • bisacodyl (DULCOLAX) suppository 10 mg  10 mg QDAY PRN Taz Castro M.D.       • Pharmacy Consult: Enteral tube feeding - review meds/change route/product selection  1 Each PRN Yris Mabry M.D.       • enoxaparin (LOVENOX) inj 40 mg  40 mg Q12HRS Remi Martinez D.O.   40 mg at 08/10/18 1718   • famotidine  (PEPCID) tablet 20 mg  20 mg Q12HRS HILL Anderson.O.   20 mg at 08/10/18 1718   • amiodarone (CORDARONE) tablet 400 mg  400 mg TWICE DAILY HILL Anderson.O.   400 mg at 08/10/18 1720   • propofol (DIPRIVAN) injection  0-80 mcg/kg/min Continuous Henrry Ying M.D. 64.3 mL/hr at 08/10/18 1924 80 mcg/kg/min at 08/10/18 1924   • lactated ringers infusion   Continuous HILL Anderson.O. 10 mL/hr at 08/10/18 1538     • Respiratory Care per Protocol   Continuous RT Henrry Ying M.D.       • MD ALERT...Adult ICU Electrolyte Replacement per Pharmacy Protocol   pharmacy to dose Henrry Ying M.D.       • fentaNYL (SUBLIMAZE) injection 25 mcg  25 mcg Q HOUR PRN Henrry Ying M.D.        Or   • fentaNYL (SUBLIMAZE) injection 50 mcg  50 mcg Q HOUR PRN Henrry Ying M.D.        Or   • fentaNYL (SUBLIMAZE) injection 100 mcg  100 mcg Q HOUR PRN Henrry Ying M.D.   100 mcg at 08/10/18 0851   • ipratropium-albuterol (DUONEB) nebulizer solution  3 mL Q2HRS PRN (RT) Henrry Ying M.D.       • ipratropium-albuterol (DUONEB) nebulizer solution  3 mL Q4HRS (RT) Henrry Ying M.D.   3 mL at 08/10/18 1835   • insulin regular (HUMULIN R) injection 2-9 Units  2-9 Units Q6HRS Henrry Ying M.D.   Stopped at 08/08/18 1200    And   • dextrose 50% (D50W) injection 25 mL  25 mL Q15 MIN PRN Henrry Ying M.D.         Last reviewed on 8/8/2018  5:12 PM by Jaleesa Gagnon    Quality  Measures:  Labs reviewed, Medications reviewed and Radiology images reviewed  Raygoza catheter: No Raygoza  Central line in place: Sepsis    DVT Prophylaxis: Enoxaparin (Lovenox)  DVT prophylaxis - mechanical: SCDs  Ulcer prophylaxis: Yes  Antibiotics: Treating active infection/contamination beyond 24 hours perioperative coverage      Impression and plan:    1. Acute severe hypoxemic respiratory failure    -Streptococcal pneumonia by culture  -Patient has recent  history of pneumonia was only partially treated  -Note she has associated bacterial meningitis with streptococcal pneumonia  -Continue her ventilator bundle  -Continue with elevated PEEP slightly to better aerate the left lung though there may be a component of chronic right hemidiaphragm elevation, essentially unchanged from yesterday.  -Obesity with obstructive sleep apnea as noted.   -Patient noncompliant, with previous outpatient CPAP.    2. 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    -Patient's LP data is notable for WBC with predominance of polys, however patient did have normal glucose and slightly elevated protein.   -There is also element of increased lymphocytes  -CSF is now showing coag-negative staph. Unclear if this is pertinent organism  -Continue meningeal dose treatment, but can decrease steroids to off, and continue with Rocephin only.  -Appreciate infectious disease continued to follow closely.     3. A. fib with rapid ventricular rate    -With now converted to oral therapy yesterday.  -She is at high risk for developing repeated atrial fibrillation with rapid rate, however.  -Rate controlled    4. Probable aspiration event    -The patient's depressed mental status, she has been started on aspiration coverage, no most likely streptococcal pneumonia remains.    5. Hypertensive heart disease    -Follow-up echocardiogram reveals grade 2 diastolic dysfunction  -Watch for significant fluid overload    6. Mild rhabdomyolysis    -Follow CPK and continue with fluid resuscitation, therefore we will discontinue CK levels.    7. Wounds to the right shoulder and posterior legs possibly related to down time.    -Follow-up with wound care as needed.  -Likely due to and found down.    Discussed patient condition and risk of morbidity and/or mortality with MD rounds  The patient remains critically ill.  Critical care time = 50 minutes in directly providing and coordinating critical care  and extensive data review.  No time overlap and excludes procedures.    Remi Martinez D.O.  Critical Care Medicine

## 2018-08-11 NOTE — PROGRESS NOTES
12 hour chart check    Skin check: legs mottled and dusky 2+ generalized edema, free of pressure injury. Healing ankle and back tear. Pannus intact but pink. Blanches. interdry in place for wicking. Buttocks intact. BMS in place with pillow case wrapped around tube for skin protective measures. Pink, blanches. Generalized bruising on all extremities.     MS: SB-SR 54-67 .16/.12/.54

## 2018-08-11 NOTE — CARE PLAN
Problem: Ventilation Defect:  Goal: Ability to achieve and maintain unassisted ventilation or tolerate decreased levels of ventilator support  Outcome: PROGRESSING SLOWER THAN EXPECTED  Adult Ventilation Update    Total Vent Days: 5    Patient Lines/Drains/Airways Status    Active Airway     Name: Placement date: Placement time: Site: Days:    Airway Group ET Tube Oral 7.5@23 08/07/18      Oral   5              CMV 28/430/12/40%    #FVC / Vital Capacity (liters) : 1.7 (08/10/18 0749)  NIF (cm H2O) : -34 (08/10/18 0749)  Rapid Shallow Breathing Index (RR/VT): 52 (08/10/18 0749)  Plateau Pressure (Q Shift): 22 (08/10/18 1841)  Static Compliance (ml / cm H2O): 65 (08/11/18 0526)    Patient failed trials because of Barriers to Wean: FiO2 >60% or PEEP >10 CM H2O (08/10/18 1440)  Barriers to SBT Weaning Trial Stopped due to:: Pt weaned for 1 hour and returned to rest settings per protocol (08/10/18 0749)  Length of Weaning Trial Length of Weaning Trial (Hours): 1 (08/10/18 0749)    Sputum/Suction   Cough: Productive (08/11/18 0400)  Sputum Amount: Large (08/11/18 0400)  Sputum Color: Tan;White (08/11/18 0400)  Sputum Consistency: Thin;Thick (08/11/18 0400)    Mobility  Level of Mobility: Level I (08/11/18 0400)  Activity Performed: Unable to mobilize (08/11/18 0000)  Time Activity Tolerated: 10 min (08/10/18 0200)  Distance Per Occurrence (ft.): 0 feet (08/10/18 0200)  # of Times Distance was Traveled: 0 (08/10/18 0200)  Assistance / Tolerance: Assistance of One (08/10/18 1400)  Pt Calls for Assistance: No (08/10/18 1400)  Staff Present for Mobilization: RN (08/10/18 1400)  Gait: Unable to Ambulate (08/10/18 1800)  Reason Not Mobilized: Unstable condition (08/10/18 2000)  Mobilization Comments: PEEP 12 (08/11/18 0000)    Events/Summary/Plan: No ventilator changes made.

## 2018-08-11 NOTE — PROGRESS NOTES
Pulmonary Critical Care Progress Note        Chief Complaint: Respiratory failure with pneumonia, meningitis    History of Present Illness: 57 yo female pmh obesity, raya, htn presented with altered mental status post fall.  Presented with fever. Since then bronchoscopy with BAL + for strep pneumo, intubated for respiratory failure and LP with + coag neg staph with ? Chemistry/heme for meningitis.  This morning on high dose propofol, moves all extremities, limited response to verbal, does not follow commands.  Also downtrending cpk regarding rhabdomyolysis.  On amiodarone and bb for new onset afib.  On increased peep overnight.         ROS:  Respiratory: unable to perform due to the patient's inability to effectively communicate, Cardiac: unable to perform due to the patient's inability to effectively communicate, GI: unable to perform due to the patient's inability to effectively communicate.  All other systems negative.    Interval Events:  24 hour interval history reviewed    o2 sat 100%, will reduce peep  Limited response to exam due to high dose sedation, agitated with weaning  On propofol  Downtrending cpk  Off continuous fluids  Rocephin for strep pneumonia and meningitis (ID thinks pneumonia and low suspicion of  Meningitis)    PFSH:  No change.    Respiratory:  Kenney Vent Mode: APVCMV, Rate (breaths/min): 28, Vt Target (mL): 430, PEEP/CPAP: 12, FiO2: 40, Static Compliance (ml / cm H2O): 75, Control VTE (exp VT): 427  Pulse Oximetry: 99 %  Chest Tube Drains:       none     Exam: Intermittent rhonchi bilateral, no significant wheezing and rhonchi bibasilar  ImagingCXR  I reviewed chest xray personally.  There is continued infiltrates, R>L, improved. No significant congestion or pleural effusions    Recent Labs      08/09/18   0457  08/10/18   0456   ISTATAPH  7.426  7.439   ISTATAPCO2  34.3  34.2   ISTATAPO2  85  88*   ISTATATCO2  24  24   [id number]  97  97   ISTATARTHCO3  22.5  23.1   ISTATARTBE  -1  -1    ISTATTEMP  36.2 C  37.1 C   ISTATFIO2  40  40   ISTATSPEC  Arterial  Arterial   ISTATAPHTC  7.438  7.437   [id number]  81  89*       HemoDynamics:  Pulse: (!) 56, Heart Rate (Monitored): (!) 57  Blood Pressure: 142/82, NIBP: 145/79       Exam: Irregularly irregular rhythm on oral amiodarone and bb, rate controlled at this time.  Off continuous fluids  Imaging: echo Reviewed   8/8/2018  Transthoracic  Echo Report      Echocardiography Laboratory    CONCLUSIONS  No prior study is available for comparison.   Normal left ventricular systolic function.  Left ventricular ejection fraction is visually estimated to be 60%.  Grade I diastolic dysfunction.  The right ventricle was normal in size and function.  No significant valve disease or flow abnormalities.   Recent Labs      08/09/18   0344  08/10/18   0325   CPKTOTAL  2390*  1061*   TROPONINI  0.15*   --        Neuro:  GCS Total Rama Coma Score: 10       Exam: Heavily sedated. On propofol, agitation with weaning.  Moves all extremities., does not follow commands.  No neck rigidity., pupils equally reactive    Lumbar puncture data from 8/8/2018 at 120 in the a.m.:    Results for LIBBY RIGGINS (MRN [id number]) as of 8/8/2018 18:53   Ref. Range 8/8/2018 01:20   Number Of Tubes Unknown 4   Volume Latest Units: mL 3.0   Color-Body Fluid Unknown Colorless   Character-Body Fluid Unknown Clear   Total WBC Count Latest Ref Range: 0 - 10 cells/uL 36 (H)   Total RBC Count Latest Units: cells/uL 187   Crenated RBC Latest Units: % 0   Polys Latest Units: % 60   Lymphs Latest Units: % 33   Mononuclear Cells - CSF Latest Units: % 4   Unidentified Cells - CSF Latest Units: % 3   CSF Nucleated Red Blood Cell Unknown 1   CSF Tube Number Unknown 3   Glucose CSF Latest Ref Range: 40 - 80 mg/dL 79   Total Protein, CSF Latest Ref Range: 15 - 45 mg/dL 88 (H)     Imaging: Available data reviewed.  Impression       1.  No acute intracranial abnormality.  2.  LEFT parietal scalp  swelling.  3.  Chronic paranasal sinus disease.     Fluids:  Intake/Output       08/09/18 0700 - 08/10/18 0659 08/10/18 0700 - 08/11/18 0659 08/11/18 0700 - 08/12/18 0659      9686-1662 5620-6111 Total 3369-9186 3375-1663 Total 7289-2489 9719-4436 Total       Intake    I.V.  652  895.6 1547.6  430  717 1147  --  -- --    Amiodarone Volume 102 -- 102 -- -- -- -- -- --    Propofol Volume 150 595.6 745.6  -- -- --    IV Volume (LR) 400 300 700 -- 100 100 -- -- --    Enteral  --  300 300  450  600 1050  --  -- --    Intake (mL) (Enteral Tube Left Nare Cortrak Gastric Feeding Tube) -- 300 300  -- -- --    Total Intake 652 1195.6 1847.6 880 1317 2197 -- -- --       Output    Urine  500  570 1070  2375  650 3025  --  -- --    Output (mL) (Urinary Catheter Indwelling Catheter)  2375 650 3025 -- -- --    Drains  --  0 0  0  0 0  --  -- --    Residual Amount (ml) (Discarded) -- 0 0 0 0 0 -- -- --    Stool  150  100 250  100  190 290  --  -- --    Number of Times Stooled -- -- -- 1 x -- 1 x -- -- --    Output (mL) (Rectal Tube Group Bowel Management System) 150 100 250 100 190 290 -- -- --    Total Output  2475 840 3315 -- -- --       Net I/O     2 525.6 527.6 -1595 477 -1118 -- -- --        Weight: (!) 136.1 kg (300 lb 0.7 oz)  Recent Labs      08/09/18   0344  08/10/18   0325  08/11/18 0322   SODIUM  138  143  143   POTASSIUM  4.4  3.8  3.4*   CHLORIDE  109  110  108   CO2  22  23  26   BUN  27*  24*  22   CREATININE  0.88  0.78  0.82   MAGNESIUM  2.2  2.0  1.9   PHOSPHORUS  2.9  2.0*  3.7   CALCIUM  8.0*  7.9*  8.4*       GI/Nutrition:  Exam: abdomen is soft and non-tender. Patient is morbidly obese. Prior surgical scars present  Imaging: None - Reviewed    Liver Function  Recent Labs      08/09/18   0344  08/10/18   0325  08/11/18   0322   GLUCOSE  136*  89  90       Heme:  Recent Labs      08/09/18   0344  08/10/18   0325  08/11/18   0322   RBC  4.55  4.12*  4.36    HEMOGLOBIN  13.5  12.2  12.9   HEMATOCRIT  41.3  37.2  39.9   PLATELETCT  158*  163*  192       Infectious Disease:  Monitored Temp 2  Av.4 °C (99.3 °F)  Min: 37 °C (98.6 °F)  Max: 37.8 °C (100 °F)  Micro: antibiotics reviewed. CSF + coag negative strep, likely contaminate per ID, + BAL for strep pneumo     On rocephin for pneumonia, had 5 days of meningitis dosing.    Recent Labs      18   0344  08/10/18   0325  18   0322   WBC  9.9  9.5  7.7   NEUTSPOLYS  82.30*  65.00  49.10   LYMPHOCYTES  10.30*  25.30  38.60   MONOCYTES  7.00  8.90  9.80   EOSINOPHILS  0.00  0.30  1.40   BASOPHILS  0.10  0.20  0.50     Current Facility-Administered Medications   Medication Dose Frequency Provider Last Rate Last Dose   • potassium chloride in water (KCL) ivpb **Administer in ICU only** 20 mEq  20 mEq Once Yris Mabry M.D. 50 mL/hr at 18 0801 20 mEq at 18 0801   • Magnesium Sulfate in D5W IVPB premix 1 g  1 g Once Yris Mabry M.D.       • calcium GLUConate 1 g in D5W 100 mL IVPB  1 g Once Siddhartha Alvarez M.D.       • lidocaine (XYLOCAINE) 2 % injection 3 mL  3 mL PRN Remi Martinez D.BONNIE       • cefTRIAXone (ROCEPHIN) 2 g in  mL IVPB  2 g Q24HRS Yris Mabry M.D.   Stopped at 18 0547   • carvedilol (COREG) tablet 3.125 mg  3.125 mg BID WITH MEALS Taz Castro M.D.   Stopped at 18 0630   • magnesium hydroxide (MILK OF MAGNESIA) suspension 30 mL  30 mL QDAY PRN Taz Castro M.D.        And   • senna-docusate (PERICOLACE or SENOKOT S) 8.6-50 MG per tablet 2 Tab  2 Tab BID Taz Castro M.D.   Stopped at 08/11/18 0600    And   • polyethylene glycol/lytes (MIRALAX) PACKET 1 Packet  1 Packet QDAY PRN Taz Castro M.D.        And   • bisacodyl (DULCOLAX) suppository 10 mg  10 mg QDAY PRN Taz Castro M.D.       • Pharmacy Consult: Enteral tube feeding - review meds/change route/product selection  1 Each PRN Yris Mabry M.D.       • enoxaparin  (LOVENOX) inj 40 mg  40 mg Q12HRS HILL Anderson.O.   40 mg at 08/11/18 0517   • famotidine (PEPCID) tablet 20 mg  20 mg Q12HRS Remi Martinez D.O.   20 mg at 08/11/18 0516   • amiodarone (CORDARONE) tablet 400 mg  400 mg TWICE DAILY Remi Martinez D.O.   400 mg at 08/11/18 0515   • propofol (DIPRIVAN) injection  0-80 mcg/kg/min Continuous Henrry Ying M.D. 24.1 mL/hr at 08/11/18 0800 30 mcg/kg/min at 08/11/18 0800   • lactated ringers infusion   Continuous HILL Anderson.O. 10 mL/hr at 08/10/18 1538     • Respiratory Care per Protocol   Continuous RT Henrry Ying M.D.       • MD ALERT...Adult ICU Electrolyte Replacement per Pharmacy Protocol   pharmacy to dose Henrry Ying M.D.       • fentaNYL (SUBLIMAZE) injection 25 mcg  25 mcg Q HOUR PRN Henrry Ying M.D.        Or   • fentaNYL (SUBLIMAZE) injection 50 mcg  50 mcg Q HOUR PRN Henrry Ying M.D.        Or   • fentaNYL (SUBLIMAZE) injection 100 mcg  100 mcg Q HOUR PRN Henrry Ying M.D.   100 mcg at 08/10/18 0851   • ipratropium-albuterol (DUONEB) nebulizer solution  3 mL Q2HRS PRN (RT) Henrry Ying M.D.       • ipratropium-albuterol (DUONEB) nebulizer solution  3 mL Q4HRS (RT) Henrry Ying M.D.   3 mL at 08/11/18 0625   • insulin regular (HUMULIN R) injection 2-9 Units  2-9 Units Q6HRS Henrry Ying M.D.   Stopped at 08/08/18 1200    And   • dextrose 50% (D50W) injection 25 mL  25 mL Q15 MIN PRN Henrry Ying M.D.         Last reviewed on 8/8/2018  5:12 PM by Azul Arcos PhT    Quality  Measures:   Labs reviewed, Medications reviewed and Radiology images reviewed  Raygoza catheter: Critically Ill - Requiring Accurate Measurement of Urinary Output   Central line in place: Sepsis     DVT Prophylaxis: Enoxaparin (Lovenox)   DVT prophylaxis - mechanical: SCDs   Ulcer prophylaxis: Yes   Antibiotics: Treating active infection/contamination beyond 24 hours  perioperative coverage      Impression and plan:  1. Acute hypoxemic respiratory failure, severe.   Likely secondary to strep pneumonia. Continue on ventilator with weaning of sedation.  Added fentanyl drip for  Agitation.  Reduce PEEP for Sao2 > 89%, changed to 8. Reduced rr to 24    2. Encephalopathy/Altered mental status.  Secondary to sepsis.  Less likely meningitis, ID following.  Reduced rocephin to pneumonia dosing.    3. Pneumonia.  As above.  On rocephin for total 7 days.  Continue chest physiotherapy/suctioning    4. Atrial fibrillation.  New onset.  Rate controlled on amiodarone and bb.  Will uptitrate bb as necessary and discontinue amiodarone for now.  On lovenox bid for px, chads1    5. Hypertension: echo diastolic dysfunction. For now controlled.    6. Rhabdomyolysis: downtrending, repeat cpk in am.    7. Skin wounds, right shoulder and posterior legs, wound care to follow during hospitalization    Discussed patient condition and risk of morbidity and/or mortality with MD rounds  The patient remains critically ill.  Critical care time = 45 minutes in directly providing and coordinating critical care and extensive data review.  No time overlap and excludes procedures.    Narendra Granger M.D.  Critical Care Medicine

## 2018-08-11 NOTE — PROGRESS NOTES
Internal Medicine Interval Note  Note Author: Yris Mabry M.D.     Name Salome Quinones 1960   Age/Sex 58 y.o. female   MRN [id number]   Code Status FULL      After 5PM or if no immediate response to page, please call for cross-coverage  Attending/Team: Dr. Martinez / HUMPHREY Sheehan See Patient List for primary contact information  Call [phone] to page    1st Call - Day Intern (R1):   N/A 2nd Call - Day Sr. Resident (R2/R3):   Dr. Mabry         Reason for interval visit  (Principal Problem)   Severe sepsis (HCC)    Interval Problem Daily Status Update  (24 hours)     ID : This 58 year-old lady with a PMHx of HTN was found unresponsive on the floor by her  on 2018 evening. She felt unwell on , was lethargic and possible headache on 18, denies any specific symptoms, was last found awake and alert on  pm by her . She was found laying on the floor on 8 am and family felt she was sleeping on the floor, still on floor on 8 pm and did not respond which is when family got concerned and called EMS. She was in respiratory distress with RR of 40 when EMS arrived, with /110, Temp of 104 F and blood glucose of 110. She was intubated at the scene. In [facility], she was febrile with temp of 38.8 F, tachycardic with -120. She had leucocytosis of 16.3 on admission with lactate of 2, and scored 4/4 on SIRS and 2 on qSOFA. CT Head did not reveal any acute intracranial abnormality. CT C-spine showed some degenerative changes. CXR revealed bibasilar atelectasis. CTPA was negative for pulmonary embolus but did show some prominent pulmonary vessels in keeping with pulmonary hypertension. A lumbar puncture was performed. CSF showed clear fluid, WBC 36, , with mildly elevated protein of 88. Gram stain and culture negative so far. Blood cultures negative so far. Bronchoscopy revealed yellow secretions bilaterally, BAL sent.  Urine analysis revealed nitrites, leucocyte  esterase, WBC 5-10, nil on gram stain. She was started on ampicillin, ceftriaxone, vancomycin and acyclovir for severe sepsis with possible sources being meningitis vs aspiration pneumonia vs UTI. Procalcitonin elevated at 0.36. Electrolyte abnormalities repleted on admission. CPK elevated at 2390. She went into atrial fibrillation with RVR and was started on amiodarone. Respiratory cultures positive for streptococcal pneumoniae. Initial CSF positive for light growth of strept pneumo, however update CSF cultures suggest coagulase negative staph (possible contaminant). HSV PCR negative. Currently being treated for penumococcal pneumonia with ceftriaxone 2 gm daily.     Interval events 8/10/2018 :  - afebrile  - HR 50s, converted to NSR  - -150s  - continues to remain on vent support, with PEEP 12, FiO2 of 40%   - remains on propofol  - not on vasopressors  - renal function normalized  - moving extremities, responding +ve toe movement and squeezing fingers on request  - Strept pneumo in BAL   - CSF update : coagulase negative staph  - HSV PCR negative  - Dr. Kiran, ID on board  - to treat as pneumococcal pneumonia  - UOP ~3300 mls past 24 hrs, -ve balance of 1200 mls past 24 hrs  - TF via CorTrak  - CXR : B/L atelectasis and consolidation    Plan  - continue ceftriaxone to 2 gms daily, day 5 of 7  - appreciate ID recs  - Coreg to 3.125 mg BID  - reduce PEEP to 10  - start Fentanyl  - d/c amiodarone   - continue CorTrak and TF  - prophylactic Lovenox BID in view of weight/BMI      Review of Systems   Unable to perform ROS: Intubated       Consultants/Specialty  PMA - Dr. Martinez  ID - Dr. Kiran  PCP: @PCP    Disposition  ICU    Quality Measures  Quality-Core Measures   Reviewed items::  Medications reviewed, Labs reviewed and Radiology images reviewed  Raygoza catheter::  Critically Ill - Requiring Accurate Measurement of Urinary Output  Central line in place:  Sepsis  DVT prophylaxis pharmacological::   Enoxaparin (Lovenox)  DVT prophylaxis - mechanical:  SCDs  Ulcer Prophylaxis::  Yes  Antibiotics:  Treating active infection/contamination beyond 24 hours perioperative coverage          Physical Exam       Vitals:    08/11/18 0900 08/11/18 1000 08/11/18 1004 08/11/18 1132   BP:       Pulse: 67 (!) 57     Resp: 18 (!) 31     Temp:       SpO2: 98% 96% 97% 99%   Weight:       Height:         Body mass index is 41.85 kg/m². Weight: (!) 136.1 kg (300 lb 0.7 oz)  Oxygen Therapy:  Pulse Oximetry: 99 %, FiO2%: 40 %, O2 Delivery: Ventilator    Physical Exam   Constitutional: She is well-developed, well-nourished, and in no distress. No distress.   Intubated and ventilated  Soft restraints B/L wrists   HENT:   Head: Normocephalic and atraumatic.   Eyes: Conjunctivae are normal. No scleral icterus.   Neck: No JVD present.   Cardiovascular:   Normal sinus rythm   Pulmonary/Chest: She has no wheezes.   Intubated and ventilated   Abdominal: Soft. Bowel sounds are normal. She exhibits no distension. There is no tenderness. There is no rebound and no guarding.   Musculoskeletal:   Hyperkeratinization B/L LE   Lymphadenopathy:     She has no cervical adenopathy.   Neurological:   Intubated   Skin: No rash noted. She is not diaphoretic. No erythema.         Lab Data Review:         8/8/2018  11:03 AM    Recent Labs      08/09/18   0344  08/10/18   0325  08/11/18   0322   SODIUM  138  143  143   POTASSIUM  4.4  3.8  3.4*   CHLORIDE  109  110  108   CO2  22  23  26   BUN  27*  24*  22   CREATININE  0.88  0.78  0.82   MAGNESIUM  2.2  2.0  1.9   PHOSPHORUS  2.9  2.0*  3.7   CALCIUM  8.0*  7.9*  8.4*       Recent Labs      08/09/18   0344  08/10/18   0325  08/11/18   0322   GLUCOSE  136*  89  90       Recent Labs      08/09/18   0344  08/10/18   0325  08/11/18   0322   RBC  4.55  4.12*  4.36   HEMOGLOBIN  13.5  12.2  12.9   HEMATOCRIT  41.3  37.2  39.9   PLATELETCT  158*  163*  192       Recent Labs      08/09/18   0344  08/10/18   0194   08/11/18   0322   WBC  9.9  9.5  7.7   NEUTSPOLYS  82.30*  65.00  49.10   LYMPHOCYTES  10.30*  25.30  38.60   MONOCYTES  7.00  8.90  9.80   EOSINOPHILS  0.00  0.30  1.40   BASOPHILS  0.10  0.20  0.50           Assessment/Plan     * Severe sepsis (HCC)   Assessment & Plan    - Admitted following found unresponsive on the floor on 8/7/18 night with temp 104 F, /100 and blood glucose 110, RR 40 and trismus, intubated by EMS on the scene after fentanyl, rocuronium, versed, ketamine, 2 lts IVF  - per family ( and son), pt was unwell since Sunday 8/5 pm, was last seen awake and alert on 8/6 pm, c/o headache but no other symptoms,  saw her on floor on 8/6 night and 8/7 am and thought she was sleeping on floor, did not wake / respond 8/7 evening which is when family were concerned and called EMS.   - In ER, , /117, temp 38.8 C with WBC 16.3, lactate was 2 on admission. WBC down to 15.3 on 8/8/18, lactate down to 1.8  - SIRS 4/4, qSOFA 2 on admission  - CXR initial 8/7 : LLL atelectasis, intubated into right bronchus, ETT repositioned. CXR 8/8/18 - Bibasilar atelectasis, ETT in trachea  - UA : nitrites +ve, small LE, WBC 5-10, negative for bacteria.   - CT Head 8/7/18 : nil acute  - CTPA 8/7/18 : no PE, prominent pulm vessels in keeping with pHTN, possible PNA  LLL on review of images  - CT spine 8/7/18 : DJD, no fracture  - LP 8/7 : clear CSF, WBC 36, , protein 88, glucose 40, HSV negative, coag neg staph light growth   - Bronchoscopy : bilateral yellow secretions, BAL +ve for strep pneumo  - not on pressors  - Dr. Kiran , ID on board, treat as pneumococcal pneumonia, community acquired. Reduce C3 to 2gms once daily  - Update 8/10/18 : being treated as strept pneumo PNA and meningitis with rocephine 2 gms BID. More agitated when off propofol.  mls overnight, +ve fluid balance since admission, SBP 130s-160s, HSV PCR negative  - 8/11/2018 : HR in high 50s-low 60s, SBP 130s-150s,  PEEP 12, FiO2 of 40%, less agitated past 24 hrs. K low at 3.4, CXR : B/L LL atelectasis ++. Consolidation.  Imp : Severe sepsis secondary to pneumococcal pneumonia    Plan  - continue rocephine to 2 gms once daily  - appreciate ID recs  - CPK check tomorrow am  - CorTrak with TF  - reduce PEEP  - start fentanyl, with a view to reducing propofol          Encephalopathy acute secondary to hypoxia from pneumonia   Assessment & Plan    - found unresponsive on the floor on 8/7/18 night  - tylenol level < 10, BAL 0, salicylate 0  - CT Head - nil acute  - CT spine - no fracture  - CSF 8/7 : WBC 36, Protein 88, else wnl, nil on gram stain  - could be due to severe sepsis, potential source - pulmonary vs viral meningeal vs urinary  - currently intubated and ventilated  - had a dose of dexamethasone on 8/8/18  - on ceftriaxone   - Strep pneumo in BAL , updated CSF has coag neg staph likely contaminant  - Dr. Kiran aware  - to treat as pneumococcal PNA     Plan  - continue rocephine to 2 gms daily for 7 days (today is day 5)  - duonebs        Streptococcal pneumonia (HCC)   Assessment & Plan    - could be possible source of sepsis given yellow secretions on bronchoscopy.  - on rocephine day 5/7  - intubated, continue vent support per PMA and RT  - DUONEBS    Plan  - for 7 days of rocephine         Acute hypoxemic respiratory failure (HCC)   Assessment & Plan    - Intubated by EMS due to tachypnea, respiratory and metabolic acidosis on initial ABG, improved this am  - on PEEP 12, FiO2 40%  - Duonebs  - on rocephine for possible pneumonia  - CXR 8/11 : B/L LL atelectasis and consolidation    Plan  - reduce PEEP to 10  - continue vent settings per PMA, RT  - start fentanyl        Atrial fibrillation with RVR (HCC)   Assessment & Plan    - new onset this admission in the context of sepsis  - [id number]  2  - converted to NSR  - d/c PO amiodarone        Hyperglycemia   Assessment & Plan    - mild 121 8/7, expected in the current  septic phase  - No known history of diabetes.    - A1c April 2017 was 5.5  - BG at scene by EMS was 110  - insulin sliding scale        Atelectasis   Assessment & Plan    - Documented on CXR, worse LLL atelectasis 8/10  - increased PEEP to 12 on 8/10  - CXR : B/L LL atelectasis and consolidation  - reduce PEEP to 10 today          Abnormal urinalysis   Assessment & Plan    - U/A on 8/7/2018 : nitrite positive, small amount leucocyte esterase, WBC 5-10, no bacteria  - await culture  - on rocephine        Elevated troponin   Assessment & Plan    - On admission trop 0.12.  EKG no st/t changes  - Trop 0.15 on 8/9, on amiodarone for new AFib RVR, in NSR this am  - no further trends unless clinically warranted        Rhabdomyolysis   Assessment & Plan    - Patient found down unresponsive.   - CPK on admission 2390   - BUN/Creat 28/1.29, eGFR 43, low K at 3.1  - Rpt CPK on 8/9 is 2390, and on 8/10 down to 1061  - check CPK on 8/12/18        Hypocalcemia   Assessment & Plan    - adjusted calcium was 6.46 on admission 8/7  - had 1 gm calcium gluconate and 1 gm calcium chloride since admission  - 8/9/18, ionized calcium 1.1  - for 1 gm calcium gluconate today  - ctm and replete as needed          Hypomagnesemia   Assessment & Plan    - ctm and aim Mag > 2        Hypokalemia   Assessment & Plan    - ctm and replete to aim K>4        Pulmonary hypertension (HCC)   Assessment & Plan    - CTPA showed prominent pulmonary vessels suggesting pulmonary hypertension   - TTE on 8/8/2018 : normal LV systolic function, EF 60%        Essential hypertension- (present on admission)   Assessment & Plan    - Home meds :  hydralazine, carvedilol, losartan  - not on pressors at present  - BP 130s-150s  - coreg at reduced home dose of 3.125 mg BID started 8/10          Seen and discussed with resident.  See my note below for further details.

## 2018-08-12 ENCOUNTER — APPOINTMENT (OUTPATIENT)
Dept: RADIOLOGY | Facility: MEDICAL CENTER | Age: 58
DRG: 853 | End: 2018-08-12
Attending: INTERNAL MEDICINE

## 2018-08-12 LAB
ACTION RANGE TRIGGERED IACRT: NO
ANION GAP SERPL CALC-SCNC: 8 MMOL/L (ref 0–11.9)
BASE EXCESS BLDA CALC-SCNC: 4 MMOL/L (ref -4–3)
BASOPHILS # BLD AUTO: 0.7 % (ref 0–1.8)
BASOPHILS # BLD: 0.05 K/UL (ref 0–0.12)
BODY TEMPERATURE: ABNORMAL DEGREES
BUN SERPL-MCNC: 20 MG/DL (ref 8–22)
CA-I SERPL-SCNC: 1.1 MMOL/L (ref 1.1–1.3)
CALCIUM SERPL-MCNC: 8.1 MG/DL (ref 8.5–10.5)
CHLORIDE SERPL-SCNC: 106 MMOL/L (ref 96–112)
CK SERPL-CCNC: 427 U/L (ref 0–154)
CO2 BLDA-SCNC: 28 MMOL/L (ref 20–33)
CO2 SERPL-SCNC: 27 MMOL/L (ref 20–33)
CREAT SERPL-MCNC: 0.55 MG/DL (ref 0.5–1.4)
EOSINOPHIL # BLD AUTO: 0.19 K/UL (ref 0–0.51)
EOSINOPHIL NFR BLD: 2.8 % (ref 0–6.9)
ERYTHROCYTE [DISTWIDTH] IN BLOOD BY AUTOMATED COUNT: 52.2 FL (ref 35.9–50)
GLUCOSE BLD-MCNC: 70 MG/DL (ref 65–99)
GLUCOSE BLD-MCNC: 71 MG/DL (ref 65–99)
GLUCOSE BLD-MCNC: 74 MG/DL (ref 65–99)
GLUCOSE BLD-MCNC: 79 MG/DL (ref 65–99)
GLUCOSE BLD-MCNC: 84 MG/DL (ref 65–99)
GLUCOSE BLD-MCNC: 85 MG/DL (ref 65–99)
GLUCOSE BLD-MCNC: 98 MG/DL (ref 65–99)
GLUCOSE SERPL-MCNC: 92 MG/DL (ref 65–99)
HCO3 BLDA-SCNC: 27.3 MMOL/L (ref 17–25)
HCT VFR BLD AUTO: 38.9 % (ref 37–47)
HGB BLD-MCNC: 12.5 G/DL (ref 12–16)
IMM GRANULOCYTES # BLD AUTO: 0.13 K/UL (ref 0–0.11)
IMM GRANULOCYTES NFR BLD AUTO: 1.9 % (ref 0–0.9)
INST. QUALIFIED PATIENT IIQPT: YES
LYMPHOCYTES # BLD AUTO: 2.53 K/UL (ref 1–4.8)
LYMPHOCYTES NFR BLD: 37 % (ref 22–41)
MAGNESIUM SERPL-MCNC: 1.8 MG/DL (ref 1.5–2.5)
MCH RBC QN AUTO: 29.7 PG (ref 27–33)
MCHC RBC AUTO-ENTMCNC: 32.1 G/DL (ref 33.6–35)
MCV RBC AUTO: 92.4 FL (ref 81.4–97.8)
MONOCYTES # BLD AUTO: 0.53 K/UL (ref 0–0.85)
MONOCYTES NFR BLD AUTO: 7.7 % (ref 0–13.4)
NEUTROPHILS # BLD AUTO: 3.41 K/UL (ref 2–7.15)
NEUTROPHILS NFR BLD: 49.9 % (ref 44–72)
NRBC # BLD AUTO: 0 K/UL
NRBC BLD-RTO: 0 /100 WBC
O2/TOTAL GAS SETTING VFR VENT: 30 %
PCO2 BLDA: 37 MMHG (ref 26–37)
PCO2 TEMP ADJ BLDA: 40.2 MMHG (ref 26–37)
PH BLDA: 7.48 [PH] (ref 7.4–7.5)
PH TEMP ADJ BLDA: 7.45 [PH] (ref 7.4–7.5)
PHOSPHATE SERPL-MCNC: 3.9 MG/DL (ref 2.5–4.5)
PLATELET # BLD AUTO: 193 K/UL (ref 164–446)
PMV BLD AUTO: 10.3 FL (ref 9–12.9)
PO2 BLDA: 66 MMHG (ref 64–87)
PO2 TEMP ADJ BLDA: 75 MMHG (ref 64–87)
POTASSIUM SERPL-SCNC: 3.5 MMOL/L (ref 3.6–5.5)
RBC # BLD AUTO: 4.21 M/UL (ref 4.2–5.4)
SAO2 % BLDA: 94 % (ref 93–99)
SODIUM SERPL-SCNC: 141 MMOL/L (ref 135–145)
SPECIMEN DRAWN FROM PATIENT: ABNORMAL
TRIGL SERPL-MCNC: 286 MG/DL (ref 0–149)
UFH PPP CHRO-ACNC: 0.1 U/ML
WBC # BLD AUTO: 6.8 K/UL (ref 4.8–10.8)

## 2018-08-12 PROCEDURE — 700102 HCHG RX REV CODE 250 W/ 637 OVERRIDE(OP): Performed by: INTERNAL MEDICINE

## 2018-08-12 PROCEDURE — 99291 CRITICAL CARE FIRST HOUR: CPT | Performed by: INTERNAL MEDICINE

## 2018-08-12 PROCEDURE — 36600 WITHDRAWAL OF ARTERIAL BLOOD: CPT

## 2018-08-12 PROCEDURE — 85025 COMPLETE CBC W/AUTO DIFF WBC: CPT

## 2018-08-12 PROCEDURE — A9270 NON-COVERED ITEM OR SERVICE: HCPCS | Performed by: INTERNAL MEDICINE

## 2018-08-12 PROCEDURE — 82803 BLOOD GASES ANY COMBINATION: CPT

## 2018-08-12 PROCEDURE — 85520 HEPARIN ASSAY: CPT

## 2018-08-12 PROCEDURE — 94770 HCHG CO2 EXPIRED GAS DETERMINATION: CPT

## 2018-08-12 PROCEDURE — 700105 HCHG RX REV CODE 258: Performed by: INTERNAL MEDICINE

## 2018-08-12 PROCEDURE — 83735 ASSAY OF MAGNESIUM: CPT

## 2018-08-12 PROCEDURE — 82550 ASSAY OF CK (CPK): CPT

## 2018-08-12 PROCEDURE — 700111 HCHG RX REV CODE 636 W/ 250 OVERRIDE (IP): Performed by: INTERNAL MEDICINE

## 2018-08-12 PROCEDURE — 82962 GLUCOSE BLOOD TEST: CPT | Mod: 91

## 2018-08-12 PROCEDURE — 71045 X-RAY EXAM CHEST 1 VIEW: CPT

## 2018-08-12 PROCEDURE — 94003 VENT MGMT INPAT SUBQ DAY: CPT

## 2018-08-12 PROCEDURE — 700105 HCHG RX REV CODE 258

## 2018-08-12 PROCEDURE — 94640 AIRWAY INHALATION TREATMENT: CPT

## 2018-08-12 PROCEDURE — 80048 BASIC METABOLIC PNL TOTAL CA: CPT

## 2018-08-12 PROCEDURE — 94150 VITAL CAPACITY TEST: CPT

## 2018-08-12 PROCEDURE — 84100 ASSAY OF PHOSPHORUS: CPT

## 2018-08-12 PROCEDURE — 700101 HCHG RX REV CODE 250: Performed by: INTERNAL MEDICINE

## 2018-08-12 PROCEDURE — 770022 HCHG ROOM/CARE - ICU (200)

## 2018-08-12 PROCEDURE — 84478 ASSAY OF TRIGLYCERIDES: CPT

## 2018-08-12 PROCEDURE — 700111 HCHG RX REV CODE 636 W/ 250 OVERRIDE (IP): Performed by: STUDENT IN AN ORGANIZED HEALTH CARE EDUCATION/TRAINING PROGRAM

## 2018-08-12 PROCEDURE — 82330 ASSAY OF CALCIUM: CPT

## 2018-08-12 PROCEDURE — 700105 HCHG RX REV CODE 258: Performed by: STUDENT IN AN ORGANIZED HEALTH CARE EDUCATION/TRAINING PROGRAM

## 2018-08-12 RX ORDER — LABETALOL HYDROCHLORIDE 5 MG/ML
10-20 INJECTION, SOLUTION INTRAVENOUS
Status: DISCONTINUED | OUTPATIENT
Start: 2018-08-12 | End: 2018-08-16 | Stop reason: HOSPADM

## 2018-08-12 RX ORDER — SODIUM CHLORIDE 9 MG/ML
INJECTION, SOLUTION INTRAVENOUS
Status: COMPLETED
Start: 2018-08-12 | End: 2018-08-12

## 2018-08-12 RX ORDER — MIDAZOLAM HYDROCHLORIDE 1 MG/ML
1-5 INJECTION INTRAMUSCULAR; INTRAVENOUS
Status: DISCONTINUED | OUTPATIENT
Start: 2018-08-12 | End: 2018-08-12

## 2018-08-12 RX ORDER — HYDRALAZINE HYDROCHLORIDE 20 MG/ML
10-20 INJECTION INTRAMUSCULAR; INTRAVENOUS EVERY 4 HOURS PRN
Status: DISCONTINUED | OUTPATIENT
Start: 2018-08-12 | End: 2018-08-16 | Stop reason: HOSPADM

## 2018-08-12 RX ADMIN — PROPOFOL 30 MCG/KG/MIN: 10 INJECTION, EMULSION INTRAVENOUS at 13:37

## 2018-08-12 RX ADMIN — IPRATROPIUM BROMIDE AND ALBUTEROL SULFATE 3 ML: .5; 3 SOLUTION RESPIRATORY (INHALATION) at 11:22

## 2018-08-12 RX ADMIN — IPRATROPIUM BROMIDE AND ALBUTEROL SULFATE 3 ML: .5; 3 SOLUTION RESPIRATORY (INHALATION) at 06:42

## 2018-08-12 RX ADMIN — PROPOFOL 50 MCG/KG/MIN: 10 INJECTION, EMULSION INTRAVENOUS at 02:27

## 2018-08-12 RX ADMIN — HYDRALAZINE HYDROCHLORIDE 20 MG: 20 INJECTION INTRAMUSCULAR; INTRAVENOUS at 20:21

## 2018-08-12 RX ADMIN — LABETALOL HYDROCHLORIDE 10 MG: 5 INJECTION, SOLUTION INTRAVENOUS at 19:28

## 2018-08-12 RX ADMIN — IPRATROPIUM BROMIDE AND ALBUTEROL SULFATE 3 ML: .5; 3 SOLUTION RESPIRATORY (INHALATION) at 15:07

## 2018-08-12 RX ADMIN — PROPOFOL 45 MCG/KG/MIN: 10 INJECTION, EMULSION INTRAVENOUS at 10:16

## 2018-08-12 RX ADMIN — ENOXAPARIN SODIUM 40 MG: 100 INJECTION SUBCUTANEOUS at 17:47

## 2018-08-12 RX ADMIN — MIDAZOLAM HYDROCHLORIDE 5 MG: 1 INJECTION, SOLUTION INTRAMUSCULAR; INTRAVENOUS at 12:07

## 2018-08-12 RX ADMIN — FAMOTIDINE 20 MG: 20 TABLET ORAL at 05:08

## 2018-08-12 RX ADMIN — IPRATROPIUM BROMIDE AND ALBUTEROL SULFATE 3 ML: .5; 3 SOLUTION RESPIRATORY (INHALATION) at 02:46

## 2018-08-12 RX ADMIN — MIDAZOLAM HYDROCHLORIDE 2 MG: 1 INJECTION, SOLUTION INTRAMUSCULAR; INTRAVENOUS at 14:38

## 2018-08-12 RX ADMIN — SODIUM CHLORIDE 500 ML: 9 INJECTION, SOLUTION INTRAVENOUS at 01:56

## 2018-08-12 RX ADMIN — PROPOFOL 50 MCG/KG/MIN: 10 INJECTION, EMULSION INTRAVENOUS at 05:08

## 2018-08-12 RX ADMIN — CEFTRIAXONE 2 G: 2 INJECTION, POWDER, FOR SOLUTION INTRAMUSCULAR; INTRAVENOUS at 05:08

## 2018-08-12 RX ADMIN — POTASSIUM BICARBONATE 25 MEQ: 25 TABLET, EFFERVESCENT ORAL at 10:45

## 2018-08-12 RX ADMIN — CALCIUM GLUCONATE 1 G: 94 INJECTION, SOLUTION INTRAVENOUS at 09:04

## 2018-08-12 RX ADMIN — ENOXAPARIN SODIUM 40 MG: 100 INJECTION SUBCUTANEOUS at 05:08

## 2018-08-12 RX ADMIN — FENTANYL CITRATE 25 MCG: 50 INJECTION, SOLUTION INTRAMUSCULAR; INTRAVENOUS at 23:12

## 2018-08-12 RX ADMIN — PROPOFOL 40 MCG/KG/MIN: 10 INJECTION, EMULSION INTRAVENOUS at 07:48

## 2018-08-12 RX ADMIN — MIDAZOLAM HYDROCHLORIDE 2 MG: 1 INJECTION, SOLUTION INTRAMUSCULAR; INTRAVENOUS at 11:07

## 2018-08-12 RX ADMIN — CARVEDILOL 3.12 MG: 3.12 TABLET, FILM COATED ORAL at 17:47

## 2018-08-12 ASSESSMENT — PAIN SCALES - GENERAL
PAINLEVEL_OUTOF10: 0

## 2018-08-12 ASSESSMENT — PULMONARY FUNCTION TESTS: FVC: 1.4

## 2018-08-12 NOTE — PROGRESS NOTES
Pulmonary Critical Care Progress Note        Chief Complaint: Respiratory failure with pneumonia, meningitis    History of Present Illness: 59 yo female pmh obesity, raya, htn presented with altered mental status post fall.  Presented with fever. Since then bronchoscopy with BAL + for strep pneumo, intubated for respiratory failure and LP with + coag neg staph with ? Chemistry/heme for meningitis.  Continues to be agitated and on vent support.       ROS:  Respiratory: unable to perform due to the patient's inability to effectively communicate, Cardiac: unable to perform due to the patient's inability to effectively communicate, GI: unable to perform due to the patient's inability to effectively communicate.  All other systems negative.    Interval Events:  24 hour interval history reviewed  NG pulled this am, will be replaced  Benzodiazepines prn added, continue propofol  Reduced RR  SBT adequate but mental status barrier to extubation    PFSH:  No change.    Respiratory:  [place]ney Vent Mode: Spont, Rate (breaths/min): 24, Vt Target (mL): 430, PEEP/CPAP: 8, FiO2: 30, P Support: 5, Static Compliance (ml / cm H2O): 50, Control VTE (exp VT): 428  Pulse Oximetry: 94 %  Chest Tube Drains:       none     Exam: No significant rhonchi, US left side no effusion noted, there is likely infiltrates for pna  ImagingCXR  I reviewed chest xray personally.  There is continued infiltrates, R>L, improved. No significant congestion or pleural effusions    Recent Labs      08/10/18   0456  08/12/18   0448   ISTATAPH  7.439  7.476   ISTATAPCO2  34.2  37.0   ISTATAPO2  88*  66   ISTATATCO2  24  28   [id number]  97  94   ISTATARTHCO3  23.1  27.3*   ISTATARTBE  -1  4*   ISTATTEMP  37.1 C  38.9 C   ISTATFIO2  40  30   ISTATSPEC  Arterial  Arterial   ISTATAPHTC  7.437  7.447   [id number]  89*  75       HemoDynamics:  Pulse: 64, Heart Rate (Monitored): 64  NIBP: 120/61  CVP (mm Hg): (!) 11 MM HG    Exam: Irregularly irregular rhythm on oral  amiodarone and bb, rate controlled at this time.  Off continuous fluids  Imaging: echo Reviewed   8/8/2018  Transthoracic  Echo Report      Echocardiography Laboratory    CONCLUSIONS  No prior study is available for comparison.   Normal left ventricular systolic function.  Left ventricular ejection fraction is visually estimated to be 60%.  Grade I diastolic dysfunction.  The right ventricle was normal in size and function.  No significant valve disease or flow abnormalities.   Recent Labs      08/10/18   0325  08/12/18   0524   CPKTOTAL  1061*  427*       Neuro:  GCS Total Rama Coma Score: 10       Exam: no focal deficits noted cranial nerves intact Motor and sensory exam grossly intact. On propofol, agitation with weaning.  Moves all extremities., does not follow commands.  No neck rigidity., pupils equally reactive    Lumbar puncture data from 8/8/2018 at 120 in the a.m.:    Results for LIBBY RIGGINS (MRN [id number]) as of 8/8/2018 18:53   Ref. Range 8/8/2018 01:20   Number Of Tubes Unknown 4   Volume Latest Units: mL 3.0   Color-Body Fluid Unknown Colorless   Character-Body Fluid Unknown Clear   Total WBC Count Latest Ref Range: 0 - 10 cells/uL 36 (H)   Total RBC Count Latest Units: cells/uL 187   Crenated RBC Latest Units: % 0   Polys Latest Units: % 60   Lymphs Latest Units: % 33   Mononuclear Cells - CSF Latest Units: % 4   Unidentified Cells - CSF Latest Units: % 3   CSF Nucleated Red Blood Cell Unknown 1   CSF Tube Number Unknown 3   Glucose CSF Latest Ref Range: 40 - 80 mg/dL 79   Total Protein, CSF Latest Ref Range: 15 - 45 mg/dL 88 (H)     Imaging: Available data reviewed.  Impression       1.  No acute intracranial abnormality.  2.  LEFT parietal scalp swelling.  3.  Chronic paranasal sinus disease.     Fluids:  Intake/Output       08/10/18 0700 - 08/11/18 0659 08/11/18 0700 - 08/12/18 0659 08/12/18 0700 - 08/13/18 0659      8064-5213 4969-9338 Total 2916-6880 7910-7592 Total 0438-5837 8407-2962  Total       Intake    P.O.  --  -- --  --  0 0  --  -- --    P.O. -- -- -- -- 0 0 -- -- --    I.V.  430  717 1147  663.8  642.4 1306.2  --  -- --    Magnesium Sulfate Volume -- -- -- 100 -- 100 -- -- --    Propofol Volume  463.8 482.4 946.2 -- -- --    IV Piggyback Volume (IV Piggyback) -- -- -- 100 -- 100 -- -- --    IV Piggyback Volume (IV Piggyback) -- -- -- -- 100 100 -- -- --    IV Volume (LR) -- 100 100 -- 60 60 -- -- --    Other  --  -- --  120  -- 120  --  -- --    Medications (P.O./ Enteral Liquids) -- -- -- 120 -- 120 -- -- --    Enteral  450  600 1050  750  600 1350  --  -- --    Free Water / Tube Flush -- -- -- 150 -- 150 -- -- --    Intake (mL) (Enteral Tube Left Nare Cortrak Gastric Feeding Tube)   -- -- --    Total Intake 880 1317 2197 1533.8 1242.4 2776.2 -- -- --       Output    Urine  2375  650 3025  900  770 1670  --  -- --    Output (mL) (Urinary Catheter Indwelling Catheter) 2375 650 3025  -- -- --    Drains  0  0 0  --  0 0  --  -- --    Residual Amount (ml) (Discarded) 0 0 0 -- 0 0 -- -- --    Stool  100  190 290  --  60 60  --  -- --    Number of Times Stooled 1 x -- 1 x -- -- -- -- -- --    Measurable Stool (mL) -- -- -- -- 60 60 -- -- --    Output (mL) (Rectal Tube Group Bowel Management System) 100 190 290 -- -- -- -- -- --    Total Output 2475 840 3315  -- -- --       Net I/O     -1595 477 -1118 633.8 412.4 1046.2 -- -- --        Weight: (!) 137.6 kg (303 lb 5.7 oz)  Recent Labs      08/10/18   0325  08/11/18   0322  08/12/18   0524   SODIUM  143  143  141   POTASSIUM  3.8  3.4*  3.5*   CHLORIDE  110  108  106   CO2  23  26  27   BUN  24*  22  20   CREATININE  0.78  0.82  0.55   MAGNESIUM  2.0  1.9  1.8   PHOSPHORUS  2.0*  3.7  3.9   CALCIUM  7.9*  8.4*  8.1*       GI/Nutrition:  Exam: nontender, without masses or organomegaly. Patient is morbidly obese. Prior surgical scars present  Imaging: None - Reviewed    Liver  Function  Recent Labs      08/10/18   0325  08/11/18   0322  08/12/18   05   GLUCOSE  89  90  92       Heme:  Recent Labs      08/10/18   0325  08/11/18   0322  08/12/18   05   RBC  4.12*  4.36  4.21   HEMOGLOBIN  12.2  12.9  12.5   HEMATOCRIT  37.2  39.9  38.9   PLATELETCT  163*  192  193       Infectious Disease:  Monitored Temp 2  Av °C (98.6 °F)  Min: 36.7 °C (98.1 °F)  Max: 37.3 °C (99.1 °F)  Temp  Av.2 °C (98.9 °F)  Min: 37.1 °C (98.8 °F)  Max: 37.3 °C (99.1 °F)  Micro: antibiotics reviewed. CSF + coag negative strep, likely contaminate per ID, + BAL for strep pneumo     On rocephin for pneumonia, had 5 days of meningitis dosing.    Recent Labs      08/10/18   0325  08/11/18   0322  08/12/18   0524   WBC  9.5  7.7  6.8   NEUTSPOLYS  65.00  49.10  49.90   LYMPHOCYTES  25.30  38.60  37.00   MONOCYTES  8.90  9.80  7.70   EOSINOPHILS  0.30  1.40  2.80   BASOPHILS  0.20  0.50  0.70     Current Facility-Administered Medications   Medication Dose Frequency Provider Last Rate Last Dose   • potassium bicarbonate (KLYTE) 25 MEQ effervescent tablet TBEF 50 mEq  50 mEq Once Siddhartha Alvarez M.D.       • calcium GLUConate 1 g in D5W 100 mL IVPB  1 g Once Siddhartha Alvarez M.D.       • fentaNYL (SUBLIMAZE) 50 mcg/mL in 50mL (Continuous Infusion)   Continuous Siddhartha Alvarez M.D. 0.5 mL/hr at 18 1100 25 mcg/hr at 18 1100   • lidocaine (XYLOCAINE) 2 % injection 3 mL  3 mL PRN Remi Martinez D.O.       • cefTRIAXone (ROCEPHIN) 2 g in  mL IVPB  2 g Q24HRS Yris Mabry M.D.   Stopped at 18 0538   • carvedilol (COREG) tablet 3.125 mg  3.125 mg BID WITH MEALS Taz Castro M.D.   Stopped at 18 0630   • Pharmacy Consult: Enteral tube feeding - review meds/change route/product selection  1 Each PRN Yris Mabry M.D.       • enoxaparin (LOVENOX) inj 40 mg  40 mg Q12HRS Remi Martinez D.O.   40 mg at 18 0508   • famotidine (PEPCID) tablet 20 mg  20 mg Q12HRS Remi MONTAGUE  Michelle, D.OLesa   20 mg at 08/12/18 0508   • propofol (DIPRIVAN) injection  0-80 mcg/kg/min Continuous Henrry Ying M.D. 32.2 mL/hr at 08/12/18 0748 40 mcg/kg/min at 08/12/18 0748   • lactated ringers infusion   Continuous Remi Martinez D.O.   Stopped at 08/11/18 0700   • Respiratory Care per Protocol   Continuous RT Henrry Ying M.D.       • MD ALERT...Adult ICU Electrolyte Replacement per Pharmacy Protocol   pharmacy to dose Henrry Ying M.D.       • fentaNYL (SUBLIMAZE) injection 25 mcg  25 mcg Q HOUR PRN Henrry Ying M.D.        Or   • fentaNYL (SUBLIMAZE) injection 50 mcg  50 mcg Q HOUR PRN Henrry Ying M.D.        Or   • fentaNYL (SUBLIMAZE) injection 100 mcg  100 mcg Q HOUR PRN Henrry Ying M.D.   100 mcg at 08/10/18 0851   • ipratropium-albuterol (DUONEB) nebulizer solution  3 mL Q2HRS PRN (RT) Henrry Ying M.D.       • ipratropium-albuterol (DUONEB) nebulizer solution  3 mL Q4HRS (RT) Henrry Ying M.D.   3 mL at 08/12/18 0642   • insulin regular (HUMULIN R) injection 2-9 Units  2-9 Units Q6HRS Henrry Ying M.D.   Stopped at 08/08/18 1200    And   • dextrose 50% (D50W) injection 25 mL  25 mL Q15 MIN PRN Henrry Ying M.D.         Last reviewed on 8/8/2018  5:12 PM by Jaleesa Gagnon    Quality  Measures:  Labs reviewed, Medications reviewed and Radiology images reviewed  Raygoza catheter: Critically Ill - Requiring Accurate Measurement of Urinary Output  Central line in place: Sepsis    DVT Prophylaxis: Enoxaparin (Lovenox)  DVT prophylaxis - mechanical: SCDs  Ulcer prophylaxis: Yes  Antibiotics: Treating active infection/contamination beyond 24 hours perioperative coverage      Impression and plan:  1. Acute hypoxemic respiratory failure, severe.   Likely secondary to strep pneumonia. Added benzo prn in addition to propofol, sbt adequate other than mental status.  Fentanyl drip.  May consider precedex if  continued inability to wean.  Reduced RR.      2. Encephalopathy/Altered mental status.  Secondary to sepsis.  Less likely meningitis, ID following.  Reduced rocephin to pneumonia dosing.    3. Pneumonia.  As above.  On rocephin for total 7 days.  Continue chest physiotherapy/suctioning    4. Atrial fibrillation.  New onset.  Rate controlled on amiodarone and bb.  BB held several doses, off amiodarton 8/11,   On lovenox bid for px, chads1    5. Hypertension: echo diastolic dysfunction. For now controlled.    6. Rhabdomyolysis: downtrending, no further cpk    7. Skin wounds, right shoulder and posterior legs, wound care to follow during hospitalization    8. Hypokalemia, continue to supplement    Addendum:   3:45 pm, patient more awake, followed commands, respiratory status adequate, extubated without complication    Discussed patient condition and risk of morbidity and/or mortality with MD rounds  The patient remains critically ill.  Critical care time = 40 minutes in directly providing and coordinating critical care and extensive data review.  No time overlap and excludes procedures.    Narendra Granger M.D.  Critical Care Medicine

## 2018-08-12 NOTE — CARE PLAN
Problem: Infection  Goal: Will remain free from infection  Scheduled abx given as ordered. Suctioning provided as needed. Oral care q2h    Problem: Bowel/Gastric:  Goal: Normal bowel function is maintained or improved  bms in place. Continuous small watery bowel movement.

## 2018-08-12 NOTE — PROGRESS NOTES
[facility] Progress Note      Admit Date: 8/7/2018  ICU Day: 6    Resident(s): Ruthy Hall  Attending: HUMPHREY VELIZ/ Dr. Granger    Date & Time:   8/12/2018   11:42 AM       Patient ID:    Name:             Salome Quinones     YOB: 1960  Age:                 58 y.o.  female   MRN:               [id number]    Diagnosis ;  Severe sepsis secondary to pneumococcal pneumonia    ID:  Patient is a 58 year old lady with past medical history significant for hypertension was brought to the ED after being found unresponsive on the floor by her , intubated at the scene for respiratory distress. Up on arrival, febrile with temp of 38.8 F, tachycardic with -120. She had leucocytosis of 16.3 on admission with lactate of 2, and scored 4/4 on SIRS and 2 on qSOFA. CT Head did not reveal any acute intracranial abnormality. CT C-spine showed some degenerative changes. CXR revealed bibasilar atelectasis. CTPA was negative for pulmonary embolus but did show some prominent pulmonary vessels in keeping with pulmonary hypertension. A lumbar puncture was performed. CSF showed clear fluid, WBC 36, , with mildly elevated protein of 88. Gram stain and culture negative so far. Blood cultures negative so far. Bronchoscopy revealed yellow secretions bilaterally, BAL sent.  Urine analysis revealed nitrites, leucocyte esterase, WBC 5-10, nil on gram stain. She was started on ampicillin, ceftriaxone, vancomycin and acyclovir for severe sepsis with possible sources being meningitis vs aspiration pneumonia vs UTI. Admitted to ICU for further management    Consultants:  PMA: / Dr Martinez    Interval Events:  - Mild agitation this morning, pulled out her cortrak, replaced again.  - Follows commands however is impulsive, versed pushes added along with fentanyl. Goal is to titrate down the propofol.  - HR 50-60s, BP 120s-140s, afebrile.  - continue vent support, decrease RR to 20 /min  - Continue tube feeds, BG  "70-90s, BMS with watery stools.  - Replaced electrolytes as needed, CPK trending down, has adequate urine output  - On DVT prophylaxis BID, follow up on anti xa level  - continue rocephin, day 6 of 7. ID following the patient.    Review of Systems   Unable to perform ROS: Intubated       PHYSICAL EXAM  Vitals:    08/12/18 0754 08/12/18 0800 08/12/18 0900 08/12/18 1120   BP:       Pulse:  68 61    Resp:  (!) 24 (!) 24    Temp:       SpO2: 93%   97%   Weight:       Height:         Body mass index is 42.31 kg/m².  /82   Pulse 61   Temp 37.1 °C (98.8 °F)   Resp (!) 24   Ht 1.803 m (5' 11\")   Wt (!) 137.6 kg (303 lb 5.7 oz)   SpO2 97%   BMI 42.31 kg/m²   O2 therapy: Pulse Oximetry: 97 %, O2 Delivery: Ventilator    Physical Exam   Constitutional:   Appears ill, intubated and sedated   HENT:   Head: Normocephalic and atraumatic.   Eyes: Pupils are equal, round, and reactive to light.   Cardiovascular: Normal rate, regular rhythm and normal heart sounds.    Pulmonary/Chest: She has rales.   Intubated  Bibasal rales heard   Abdominal: Soft. She exhibits no distension.   Neurological:   Intubated and sedated   Skin: Skin is warm.       Respiratory:  Kenney Vent Mode: APVCMV  Respiration: (!) 24, Pulse Oximetry: 97 %    Chest Tube Drains:    Recent Labs      08/10/18   0456  08/12/18   0448   ISTATAPH  7.439  7.476   ISTATAPCO2  34.2  37.0   ISTATAPO2  88*  66   ISTATATCO2  24  28   [id number]  97  94   ISTATARTHCO3  23.1  27.3*   ISTATARTBE  -1  4*   ISTATTEMP  37.1 C  38.9 C   ISTATFIO2  40  30   ISTATSPEC  Arterial  Arterial   ISTATAPHTC  7.437  7.447   [id number]  89*  75       HemoDynamics:  Pulse: 61, Heart Rate (Monitored): 62 NIBP: 142/69 CVP (mm Hg): (!) 10 MM HG      Fluids:    Date 08/12/18 0700 - 08/13/18 0659   Shift 2692-9780 4257-7547 1477-9986 24 Hour Total   I  N  T  A  K  E   I.V. 170.1   170.1      Propofol Volume 70.1   70.1      IV Piggyback Volume (IV Piggyback) 100   100    Enteral 100   " 100      Free Water / Tube Flush 0   0      Intake (mL) ([REMOVED] Enteral Tube Left Nare Cortrak Gastric Feeding Tube) 100   100    Shift Total 270.1   270.1   O  U  T  P  U  T   Urine 110   110      Output (mL) (Urinary Catheter Indwelling Catheter) 110   110    Stool 60   60      Measurable Stool (mL) 60   60    Shift Total 170   170   .1   100.1        Intake/Output Summary (Last 24 hours) at 18 1142  Last data filed at 18 0800   Gross per 24 hour   Intake           2286.8 ml   Output             1600 ml   Net            686.8 ml       Weight: (!) 137.6 kg (303 lb 5.7 oz)  Body mass index is 42.31 kg/m².    Recent Labs      08/10/18   0325  08/11/18   0322  08/12/18   05   SODIUM  143  143  141   POTASSIUM  3.8  3.4*  3.5*   CHLORIDE  110  108  106   CO2  23  26  27   BUN  24*  22  20   CREATININE  0.78  0.82  0.55   MAGNESIUM  2.0  1.9  1.8   PHOSPHORUS  2.0*  3.7  3.9   CALCIUM  7.9*  8.4*  8.1*       GI/Nutrition:  Recent Labs      08/10/18   0325  08/11/18   0322  08/12/18   0524   GLUCOSE  89  90  92       Heme:  Recent Labs      08/10/18   0325  08/11/18   0322  08/12/18   0524   RBC  4.12*  4.36  4.21   HEMOGLOBIN  12.2  12.9  12.5   HEMATOCRIT  37.2  39.9  38.9   PLATELETCT  163*  192  193       Infectious Disease:  Monitored Temp 2  Av.9 °C (98.5 °F)  Min: 36.7 °C (98.1 °F)  Max: 37.2 °C (99 °F)  Temp  Av.1 °C (98.8 °F)  Min: 37.1 °C (98.8 °F)  Max: 37.1 °C (98.8 °F)  Recent Labs      08/10/18   0325  08/11/18   0322  08/12/18   0524   WBC  9.5  7.7  6.8   NEUTSPOLYS  65.00  49.10  49.90   LYMPHOCYTES  25.30  38.60  37.00   MONOCYTES  8.90  9.80  7.70   EOSINOPHILS  0.30  1.40  2.80   BASOPHILS  0.20  0.50  0.70       Meds:  • fentaNYL   50 mcg/hr (18 0830)   • midazolam  1-5 mg     • potassium bicarbonate  25 mEq     • lidocaine  3 mL     • cefTRIAXone (ROCEPHIN) IV  2 g Stopped (18 0538)   • carvedilol  3.125 mg     • Pharmacy  1 Each     • enoxaparin  (LOVENOX) injection  40 mg     • famotidine  20 mg     • propofol  0-80 mcg/kg/min 45 mcg/kg/min (08/12/18 1016)   • LR   Stopped (08/11/18 0700)   • Respiratory Care per Protocol       • MD LEE...Adult ICU Electrolyte Replacement per Pharmacy Protocol       • fentaNYL  25 mcg      Or   • fentaNYL  50 mcg      Or   • fentaNYL  100 mcg     • ipratropium-albuterol  3 mL     • ipratropium-albuterol  3 mL     • insulin regular  2-9 Units      And   • dextrose 50%  25 mL          Procedures:  None    Imaging:  DX-ABDOMEN FOR TUBE PLACEMENT   Final Result      Feeding tube appears to extend through the region of the stomach with tip at proximal end of the duodenum near the pylorus.      DX-CHEST-PORTABLE (1 VIEW)   Final Result      Stable examination.      DX-CHEST-PORTABLE (1 VIEW)   Final Result      Stable bilateral lower lobe atelectasis and/or consolidation.      DX-CHEST-PORTABLE (1 VIEW)   Final Result      Parahilar and bibasilar opacities are mildly improved on the right and increased on the left compared to prior.      Interstitial prominence likely represents interstitial edema.      Trace left pleural effusion is not excluded.         [id number] VIEW   Final Result      Enteric tube tip projects over the distal gastric body/antrum.      DX-CHEST-PORTABLE (1 VIEW)   Final Result      No significant change from prior exam.      ECHOCARDIOGRAM COMP W/O CONT   Final Result      DX-CHEST-PORTABLE (1 VIEW)   Final Result      Perihilar and bibasilar opacities likely represent atelectasis.      Endotracheal tube now projects at the level of the clavicular heads.         CT-CTA CHEST PULMONARY ARTERY W/ RECONS   Final Result      1.  No filling defects within the pulmonary arteries to indicate emboli.   2.  Prominent central pulmonary vessels suggesting pulmonary hypertension.   3.  RIGHT mainstem intubation with LEFT lower lobe atelectasis.      These findings were discussed with EUSEBIO SILVERIO on 8/8/2018 12:52 AM.          CT-CSPINE WITHOUT PLUS RECONS   Final Result      1.  Moderate to severe multilevel degenerative change of cervical spine.   2.  No acute fracture or subluxation.      CT-HEAD W/O   Final Result      1.  No acute intracranial abnormality.   2.  LEFT parietal scalp swelling.   3.  Chronic paranasal sinus disease.      DX-CHEST-PORTABLE (1 VIEW)   Final Result      1.  Supportive tubing as described above.   2.  Hypoinflation with elevated RIGHT hemidiaphragm.   3.  LEFT lung base atelectasis.          Problem and Plan:      * Severe sepsis (HCC)   Assessment & Plan    - Admitted following found unresponsive on the floor on 8/7/18 night with temp 104 F, /100 and blood glucose 110, RR 40 and trismus, intubated by EMS on the scene after fentanyl, rocuronium, versed, ketamine, 2 lts IVF  - per family ( and son), pt was unwell since Sunday 8/5 pm, was last seen awake and alert on 8/6 pm, c/o headache but no other symptoms,  saw her on floor on 8/6 night and 8/7 am and thought she was sleeping on floor, did not wake / respond 8/7 evening which is when family were concerned and called EMS.   - In ER, , /117, temp 38.8 C with WBC 16.3, lactate was 2 on admission. WBC down to 15.3 on 8/8/18, lactate down to 1.8  - SIRS 4/4, qSOFA 2 on admission  - CXR initial 8/7 : LLL atelectasis, intubated into right bronchus, ETT repositioned. CXR 8/8/18 - Bibasilar atelectasis, ETT in trachea  - UA : nitrites +ve, small LE, WBC 5-10, negative for bacteria.   - CT Head 8/7/18 : nil acute  - CTPA 8/7/18 : no PE, prominent pulm vessels in keeping with pHTN, possible PNA  LLL on review of images  - CT spine 8/7/18 : DJD, no fracture  - LP 8/7 : clear CSF, WBC 36, , protein 88, glucose 40, HSV negative, coag neg staph light growth   - Bronchoscopy : bilateral yellow secretions, BAL +ve for strep pneumo  - not on pressors  - Dr. Kiran , ID on board, treat as pneumococcal pneumonia, community  acquired. Reduce C3 to 2gms once daily  - Update 8/10/18 : being treated as strept pneumo PNA and meningitis with rocephine 2 gms BID. More agitated when off propofol.  mls overnight, +ve fluid balance since admission, SBP 130s-160s, HSV PCR negative  - 8/11/2018 : HR in high 50s-low 60s, SBP 130s-150s, PEEP 12, FiO2 of 40%, less agitated past 24 hrs. K low at 3.4, CXR : B/L LL atelectasis ++. Consolidation.  Imp : Severe sepsis secondary to pneumococcal pneumonia    Plan  - continue rocephin 2 gms once daily  - appreciate ID recs  - CPK trending down slowly  - CorTrak with TF  - versed pushes ordered along with fentanyl, with a view to reducing propofol          Encephalopathy acute secondary to hypoxia from pneumonia   Assessment & Plan    - found unresponsive on the floor on 8/7/18 night  - tylenol level < 10, BAL 0, salicylate 0  - CT Head - nil acute  - CT spine - no fracture  - CSF 8/7 : WBC 36, Protein 88, else wnl, nil on gram stain  - could be due to severe sepsis, potential source - pulmonary vs viral meningeal vs urinary  - currently intubated and ventilated  - had a dose of dexamethasone on 8/8/18  - on ceftriaxone   - Strep pneumo in BAL , updated CSF has coag neg staph likely contaminant  - Dr. Kiran aware  - to treat as pneumococcal PNA     Plan  - continue rocephin to 2 gms daily for 7 days (today is day 6)  - duonebs        Streptococcal pneumonia (HCC)   Assessment & Plan    - could be possible source of sepsis given yellow secretions on bronchoscopy.  - on rocephine day 5/7  - intubated, continue vent support per PMA and RT  - DUONEBS    Plan  - for 7 days of rocephin        Acute hypoxemic respiratory failure (HCC)   Assessment & Plan    - Intubated by EMS due to tachypnea, respiratory and metabolic acidosis on initial ABG, improved this am  - on PEEP 12, FiO2 40%  - Duonebs  - on rocephine for possible pneumonia  - CXR 8/11 : B/L LL atelectasis and consolidation    Plan  - reduce RR to  20  - continue vent settings per PMA, RT  - versed pushes added along with fentanyl        Atrial fibrillation with RVR (HCC)   Assessment & Plan    - new onset this admission in the context of sepsis  - [id number]  2  - converted to NSR  - d/c PO amiodarone         Hyperglycemia   Assessment & Plan    - mild 121 8/7, expected in the current septic phase  - No known history of diabetes.    - A1c April 2017 was 5.5  - BG at scene by EMS was 110  - insulin sliding scale         Atelectasis   Assessment & Plan    - Documented on CXR, worse LLL atelectasis 8/10  - increased PEEP to 12 on 8/10  - CXR : B/L LL atelectasis and consolidation  - reduce RR to 20 today          Abnormal urinalysis   Assessment & Plan    - U/A on 8/7/2018 : nitrite positive, small amount leucocyte esterase, WBC 5-10, no bacteria  - await culture  - on rocephine         Elevated troponin   Assessment & Plan    - On admission trop 0.12.  EKG no st/t changes  - Trop 0.15 on 8/9, on amiodarone for new AFib RVR, in NSR this am  - no further trends unless clinically warranted         Rhabdomyolysis   Assessment & Plan    - Patient found down unresponsive.   - CPK on admission 2390   - BUN/Creat 28/1.29, eGFR 43, low K at 3.1  - trending down, continue to monitor        Hypocalcemia   Assessment & Plan    - adjusted calcium was 6.46 on admission 8/7  - had 1 gm calcium gluconate and 1 gm calcium chloride since admission  - 8/9/18, ionized calcium 1.1  - currently stable, dc daily checks          Hypomagnesemia   Assessment & Plan    - ctm and aim Mag > 2        Hypokalemia   Assessment & Plan    - ctm and replete to aim K>4        Pulmonary hypertension (HCC)   Assessment & Plan    - CTPA showed prominent pulmonary vessels suggesting pulmonary hypertension   - TTE on 8/8/2018 : normal LV systolic function, EF 60%         Essential hypertension- (present on admission)   Assessment & Plan    - Home meds :  hydralazine, carvedilol, losartan  - not on  pressors at present  - BP 130s-150s  - coreg at reduced home dose of 3.125 mg BID started 8/10             DISPO: ICU    CODE STATUS: Full    Quality Measures:  Raygoza Catheter: Yes  DVT Prophylaxis: Yes  Ulcer Prophylaxis: Yes  Antibiotics: Yes  Lines: RIJ, RAC    Discussed and seen with resident.  Agree with above, please see my note for further details or clarifications.    Narendra Granger MD

## 2018-08-12 NOTE — PROGRESS NOTES
Patient seen in f/u this morning .  HPI: This is a 58-year-old smoker who presented with  fever, confusion, lethargy and became   unresponsive with no localizing symptoms at all, such as cough, diarrhea, or   anything to suggest possible sources of infection in her.  The patient has a bronchoscopy showing a lot of   gram-positive cocci, and subsequently grew S. Pneumoniae. Initially thought to also have meningitis, however subsequently determined to be only a contaminant. Now therefore  only being treated for pneumonia with ceftriaxone . She is more alert and  grimacing ,somewhat agitiated. Fever resolved, oxygenating well. Hemodynamically stable. Remains in NSR.  PMH- sleep apnea on mask and smoking     Meds- iv ceftriaxone 2 grams  q24 hours,  amiodarone, propofol      Discontinued- acyclovir, ampicillin, vancomycin    ROS- unable to obtain due to sedation /intubation     PHYSICAL EXAMINATION:  GENERAL:  She is intubated and sedated, afebrile, NSR in the 70s, on 30% Fi02     NECK: no rigidity ,   Her conjunctivae are somewhat swollen and red.  LUNGS:  Clear.bilaterally   HEART:  Reveals no murmur.  ABDOMEN:  Belly exam is unremarkable.  I cannot palpate any organs.  EXTREMITIES:  Again, no petechiae or ecchymoses, etc.    Skin- faint erythematous  Rash on anterior chest , not on abdomen   Quantitative Bronch Washing  (Abnormal)      Streptococcus pneumoniae   20,000 cfu/mL   Penicillin sensitive.     Quantitative Bronch Washing  (Abnormal)      Staphylococcus aureus   2,500 cfu/mL                      Assess:  1. pneumococcal pna (has LLL consolidation on CT scan) with respiratory failure-  Improved, fever and leukocytosis gone, more alert. Now no evidence for meninigtis    2. Encephalopathy- resolving /from stress/sepsis/resp failure     REC: continue ceftriaxone 2 grams daily for 5 days total

## 2018-08-12 NOTE — RESPIRATORY CARE
Ventilator Weaning Update    Patient is on vent day 6.  SBT was tolerated for a minimum of 1 hours on settings of 5/8.    Wean parameters for this SBT were:  #FVC / Vital Capacity (liters) : 1.4 (08/12/18 0754)  NIF (cm H2O) : -37 (08/12/18 0754)  Rapid Shallow Breathing Index (RR/VT): 33 (08/12/18 0754)  RR (bpm): 16 (08/12/18 0754)  Spontaneous VE: 11.4 (08/12/18 0754)  Spontaneous VT: 454 (08/12/18 0754)         none

## 2018-08-12 NOTE — PROGRESS NOTES
1550- respiratory parameters met and extubated after verbal orders from Dr. Granger given. Voice rest instructions given. Demonstrates understanding.  1600- BMS dislodged and removed. Balloon intact. Perineal care provided with barrier paste over buttocks and rectum. MD Granger notified.   1605- pt lethargic but follows appropriately. Restraints removed after teaching provided not to pull on cortrak and other lines/tubes.    1618-  Krish updated of extubation

## 2018-08-12 NOTE — PROGRESS NOTES
Monitor Summary    0.18/0.08/0.52  SB/SR 50s-60s    12 hour chart check    2 RN skin check performed. Right shoulder skin tear, DTI on heel area.

## 2018-08-12 NOTE — CARE PLAN
Problem: Safety  Goal: Will remain free from injury  Restraints in place to prevent self-extubation. cortrak removed and replaced in AM. PIV pulled out from agitation. Re-orientation required. No evidence of learning. CMS checks q2h with turns and oral care.     Problem: Discharge Barriers/Planning  Goal: Patient's continuum of care needs will be met  Unable to safely reduce sedation without alternative medications. Continue to wean over shift in order to be able to extubate when approved by MD.

## 2018-08-12 NOTE — PROGRESS NOTES
R chris alves placed at 80 cm with [facility] machine. Abdominal xray for verification placed by SHAVON Jameson.

## 2018-08-12 NOTE — PROGRESS NOTES
Pulled out cortrak. Witnessed. Tube feed stopped immediately by RN to prevent aspiration. Sedation increased and restraints tightened.

## 2018-08-12 NOTE — CARE PLAN
Problem: Bowel/Gastric:  Goal: Will not experience complications related to bowel motility    Intervention: Assess baseline bowel pattern  Normoactive x4  Intervention: Implement interventions to promote bowel evacuation if inadequate bowel movements in past 48 hours  Pt has loose, watery stool  Intervention: Implement Bowel Protocol, if applicable  Will implement if needed. Not currently needed      Problem: Pain Management  Goal: Pain level will decrease to patient's comfort goal    Intervention: Follow pain managment plan developed in collaboration with patient and Interdisciplinary Team  Done. Fentanyl running at 25mcg/hr

## 2018-08-12 NOTE — RESPIRATORY CARE
Extubation    Cuff leak noted yes  Stridor present no     FiO2%: 30 % (08/12/18 0800)        Patient toleration pt tolerated well   RCP Complete?   Events/Summary/Plan: pt awake and alert following commands, Dr Granger at bedside with assessment and extubation ok  (08/12/18 7070)

## 2018-08-12 NOTE — CARE PLAN
Problem: Ventilation Defect:  Goal: Ability to achieve and maintain unassisted ventilation or tolerate decreased levels of ventilator support  Outcome: PROGRESSING SLOWER THAN EXPECTED  Adult Ventilation Update    Total Vent Days: 6    Patient Lines/Drains/Airways Status    Active Airway     Name: Placement date: Placement time: Site: Days:    Airway Group ET Tube Oral 7.5@23 08/07/18      Oral   6              CMV 24/430/8/30%    #FVC / Vital Capacity (liters) : 1.7 (08/10/18 0749)  NIF (cm H2O) : -34 (08/10/18 0749)  Rapid Shallow Breathing Index (RR/VT): 52 (08/10/18 0749)  Plateau Pressure (Q Shift): 19 (08/11/18 1910)  Static Compliance (ml / cm H2O): 51 (08/12/18 0533)    Sputum/Suction   Cough: Productive (08/12/18 0400)  Sputum Amount: Small (08/12/18 0400)  Sputum Color: White;Clear (08/12/18 0400)  Sputum Consistency: Thin;Thick (08/12/18 0400)    Mobility  Level of Mobility: Level I (08/12/18 0000)  Activity Performed: Edge of bed (08/12/18 0000)  Time Activity Tolerated: 5 min (08/12/18 0000)  Distance Per Occurrence (ft.): 0 feet (08/12/18 0000)  # of Times Distance was Traveled: 0 (08/12/18 0000)  Assistance / Tolerance: Assistance of Two or More (08/12/18 0000)  Pt Calls for Assistance: No (08/12/18 0000)  Staff Present for Mobilization: RN (x4) (08/12/18 0000)  Gait: Unable to Ambulate (08/10/18 1800)  Reason Not Mobilized: Unstable condition (08/11/18 1200)  Mobilization Comments: not able to find safe sedation point. either too lethargic or restless.  (08/11/18 1200)    Events/Summary/Plan: No ventilator changes made.

## 2018-08-13 LAB
ALBUMIN SERPL BCP-MCNC: 3.3 G/DL (ref 3.2–4.9)
ALBUMIN/GLOB SERPL: 1.2 G/DL
ALP SERPL-CCNC: 65 U/L (ref 30–99)
ALT SERPL-CCNC: 108 U/L (ref 2–50)
ANION GAP SERPL CALC-SCNC: 6 MMOL/L (ref 0–11.9)
AST SERPL-CCNC: 85 U/L (ref 12–45)
BACTERIA BLD CULT: NORMAL
BACTERIA BLD CULT: NORMAL
BASOPHILS # BLD AUTO: 0.6 % (ref 0–1.8)
BASOPHILS # BLD: 0.05 K/UL (ref 0–0.12)
BILIRUB SERPL-MCNC: 0.5 MG/DL (ref 0.1–1.5)
BUN SERPL-MCNC: 16 MG/DL (ref 8–22)
CALCIUM SERPL-MCNC: 8.4 MG/DL (ref 8.5–10.5)
CHLORIDE SERPL-SCNC: 107 MMOL/L (ref 96–112)
CO2 SERPL-SCNC: 30 MMOL/L (ref 20–33)
CREAT SERPL-MCNC: 0.49 MG/DL (ref 0.5–1.4)
CRP SERPL HS-MCNC: 3.87 MG/DL (ref 0–0.75)
EOSINOPHIL # BLD AUTO: 0.23 K/UL (ref 0–0.51)
EOSINOPHIL NFR BLD: 2.7 % (ref 0–6.9)
ERYTHROCYTE [DISTWIDTH] IN BLOOD BY AUTOMATED COUNT: 51.9 FL (ref 35.9–50)
GLOBULIN SER CALC-MCNC: 2.7 G/DL (ref 1.9–3.5)
GLUCOSE BLD-MCNC: 108 MG/DL (ref 65–99)
GLUCOSE BLD-MCNC: 93 MG/DL (ref 65–99)
GLUCOSE SERPL-MCNC: 96 MG/DL (ref 65–99)
HCT VFR BLD AUTO: 44 % (ref 37–47)
HGB BLD-MCNC: 13.9 G/DL (ref 12–16)
IMM GRANULOCYTES # BLD AUTO: 0.15 K/UL (ref 0–0.11)
IMM GRANULOCYTES NFR BLD AUTO: 1.8 % (ref 0–0.9)
LYMPHOCYTES # BLD AUTO: 1.84 K/UL (ref 1–4.8)
LYMPHOCYTES NFR BLD: 21.9 % (ref 22–41)
MAGNESIUM SERPL-MCNC: 1.8 MG/DL (ref 1.5–2.5)
MCH RBC QN AUTO: 29.4 PG (ref 27–33)
MCHC RBC AUTO-ENTMCNC: 31.6 G/DL (ref 33.6–35)
MCV RBC AUTO: 93 FL (ref 81.4–97.8)
MONOCYTES # BLD AUTO: 0.74 K/UL (ref 0–0.85)
MONOCYTES NFR BLD AUTO: 8.8 % (ref 0–13.4)
NEUTROPHILS # BLD AUTO: 5.38 K/UL (ref 2–7.15)
NEUTROPHILS NFR BLD: 64.2 % (ref 44–72)
NRBC # BLD AUTO: 0 K/UL
NRBC BLD-RTO: 0 /100 WBC
PHOSPHATE SERPL-MCNC: 3.6 MG/DL (ref 2.5–4.5)
PLATELET # BLD AUTO: 222 K/UL (ref 164–446)
PMV BLD AUTO: 10.1 FL (ref 9–12.9)
POTASSIUM SERPL-SCNC: 3.7 MMOL/L (ref 3.6–5.5)
PREALB SERPL-MCNC: 21 MG/DL (ref 18–38)
PROT SERPL-MCNC: 6 G/DL (ref 6–8.2)
RBC # BLD AUTO: 4.73 M/UL (ref 4.2–5.4)
SIGNIFICANT IND 70042: NORMAL
SIGNIFICANT IND 70042: NORMAL
SITE SITE: NORMAL
SITE SITE: NORMAL
SODIUM SERPL-SCNC: 143 MMOL/L (ref 135–145)
SOURCE SOURCE: NORMAL
SOURCE SOURCE: NORMAL
WBC # BLD AUTO: 8.4 K/UL (ref 4.8–10.8)
WNV IGM SER QL IA: NORMAL

## 2018-08-13 PROCEDURE — 99233 SBSQ HOSP IP/OBS HIGH 50: CPT | Performed by: INTERNAL MEDICINE

## 2018-08-13 PROCEDURE — 83735 ASSAY OF MAGNESIUM: CPT

## 2018-08-13 PROCEDURE — 700111 HCHG RX REV CODE 636 W/ 250 OVERRIDE (IP): Performed by: STUDENT IN AN ORGANIZED HEALTH CARE EDUCATION/TRAINING PROGRAM

## 2018-08-13 PROCEDURE — 700102 HCHG RX REV CODE 250 W/ 637 OVERRIDE(OP): Performed by: INTERNAL MEDICINE

## 2018-08-13 PROCEDURE — 700111 HCHG RX REV CODE 636 W/ 250 OVERRIDE (IP): Performed by: INTERNAL MEDICINE

## 2018-08-13 PROCEDURE — 700105 HCHG RX REV CODE 258: Performed by: STUDENT IN AN ORGANIZED HEALTH CARE EDUCATION/TRAINING PROGRAM

## 2018-08-13 PROCEDURE — A9270 NON-COVERED ITEM OR SERVICE: HCPCS | Performed by: STUDENT IN AN ORGANIZED HEALTH CARE EDUCATION/TRAINING PROGRAM

## 2018-08-13 PROCEDURE — 84134 ASSAY OF PREALBUMIN: CPT

## 2018-08-13 PROCEDURE — 85025 COMPLETE CBC W/AUTO DIFF WBC: CPT

## 2018-08-13 PROCEDURE — 80053 COMPREHEN METABOLIC PANEL: CPT

## 2018-08-13 PROCEDURE — 86140 C-REACTIVE PROTEIN: CPT

## 2018-08-13 PROCEDURE — 84100 ASSAY OF PHOSPHORUS: CPT

## 2018-08-13 PROCEDURE — 770022 HCHG ROOM/CARE - ICU (200)

## 2018-08-13 PROCEDURE — 82962 GLUCOSE BLOOD TEST: CPT

## 2018-08-13 PROCEDURE — 700102 HCHG RX REV CODE 250 W/ 637 OVERRIDE(OP): Performed by: STUDENT IN AN ORGANIZED HEALTH CARE EDUCATION/TRAINING PROGRAM

## 2018-08-13 PROCEDURE — A9270 NON-COVERED ITEM OR SERVICE: HCPCS | Performed by: INTERNAL MEDICINE

## 2018-08-13 RX ORDER — LOSARTAN POTASSIUM 25 MG/1
25 TABLET ORAL 2 TIMES DAILY
Status: DISCONTINUED | OUTPATIENT
Start: 2018-08-13 | End: 2018-08-13

## 2018-08-13 RX ORDER — LOSARTAN POTASSIUM 25 MG/1
50 TABLET ORAL 2 TIMES DAILY
Status: DISCONTINUED | OUTPATIENT
Start: 2018-08-13 | End: 2018-08-13

## 2018-08-13 RX ORDER — OXYCODONE HYDROCHLORIDE 5 MG/1
5 TABLET ORAL EVERY 6 HOURS PRN
Status: DISCONTINUED | OUTPATIENT
Start: 2018-08-13 | End: 2018-08-13

## 2018-08-13 RX ORDER — HYDRALAZINE HYDROCHLORIDE 10 MG/1
10 TABLET, FILM COATED ORAL EVERY 8 HOURS
Status: DISCONTINUED | OUTPATIENT
Start: 2018-08-13 | End: 2018-08-13

## 2018-08-13 RX ORDER — OXYCODONE HYDROCHLORIDE 10 MG/1
10 TABLET ORAL EVERY 6 HOURS PRN
Status: DISCONTINUED | OUTPATIENT
Start: 2018-08-13 | End: 2018-08-14

## 2018-08-13 RX ORDER — OXYCODONE HYDROCHLORIDE 10 MG/1
10 TABLET ORAL EVERY 6 HOURS PRN
Status: DISCONTINUED | OUTPATIENT
Start: 2018-08-13 | End: 2018-08-13

## 2018-08-13 RX ORDER — HYDRALAZINE HYDROCHLORIDE 10 MG/1
10 TABLET, FILM COATED ORAL EVERY 8 HOURS
Status: DISCONTINUED | OUTPATIENT
Start: 2018-08-13 | End: 2018-08-14

## 2018-08-13 RX ORDER — MAGNESIUM SULFATE HEPTAHYDRATE 40 MG/ML
2 INJECTION, SOLUTION INTRAVENOUS ONCE
Status: COMPLETED | OUTPATIENT
Start: 2018-08-13 | End: 2018-08-13

## 2018-08-13 RX ORDER — LOSARTAN POTASSIUM 25 MG/1
25 TABLET ORAL 2 TIMES DAILY
Status: DISCONTINUED | OUTPATIENT
Start: 2018-08-14 | End: 2018-08-14

## 2018-08-13 RX ORDER — OXYCODONE HYDROCHLORIDE 5 MG/1
5 TABLET ORAL EVERY 6 HOURS PRN
Status: DISCONTINUED | OUTPATIENT
Start: 2018-08-13 | End: 2018-08-14

## 2018-08-13 RX ADMIN — CEFTRIAXONE 2 G: 2 INJECTION, POWDER, FOR SOLUTION INTRAMUSCULAR; INTRAVENOUS at 05:28

## 2018-08-13 RX ADMIN — HYDRALAZINE HYDROCHLORIDE 20 MG: 20 INJECTION INTRAMUSCULAR; INTRAVENOUS at 18:04

## 2018-08-13 RX ADMIN — CARVEDILOL 3.12 MG: 3.12 TABLET, FILM COATED ORAL at 08:48

## 2018-08-13 RX ADMIN — HYDRALAZINE HYDROCHLORIDE 10 MG: 10 TABLET, FILM COATED ORAL at 15:03

## 2018-08-13 RX ADMIN — HYDRALAZINE HYDROCHLORIDE 10 MG: 10 TABLET, FILM COATED ORAL at 21:20

## 2018-08-13 RX ADMIN — LOSARTAN POTASSIUM 25 MG: 25 TABLET, FILM COATED ORAL at 16:28

## 2018-08-13 RX ADMIN — ENOXAPARIN SODIUM 60 MG: 100 INJECTION SUBCUTANEOUS at 16:28

## 2018-08-13 RX ADMIN — MAGNESIUM SULFATE IN WATER 2 G: 40 INJECTION, SOLUTION INTRAVENOUS at 08:48

## 2018-08-13 RX ADMIN — POTASSIUM BICARBONATE 25 MEQ: 25 TABLET, EFFERVESCENT ORAL at 05:28

## 2018-08-13 RX ADMIN — CARVEDILOL 3.12 MG: 3.12 TABLET, FILM COATED ORAL at 16:28

## 2018-08-13 RX ADMIN — LOSARTAN POTASSIUM 25 MG: 25 TABLET, FILM COATED ORAL at 10:41

## 2018-08-13 RX ADMIN — ENOXAPARIN SODIUM 40 MG: 100 INJECTION SUBCUTANEOUS at 05:40

## 2018-08-13 ASSESSMENT — ENCOUNTER SYMPTOMS
GASTROINTESTINAL NEGATIVE: 1
ABDOMINAL PAIN: 0
CARDIOVASCULAR NEGATIVE: 1
MUSCULOSKELETAL NEGATIVE: 1
NEUROLOGICAL NEGATIVE: 1
EYES NEGATIVE: 1
PALPITATIONS: 0
HEADACHES: 0
RESPIRATORY NEGATIVE: 1
BACK PAIN: 0
NAUSEA: 0
SHORTNESS OF BREATH: 1
FLANK PAIN: 0
SORE THROAT: 0
DEPRESSION: 0
FEVER: 0
NERVOUS/ANXIOUS: 0
SPEECH CHANGE: 0
COUGH: 1
PSYCHIATRIC NEGATIVE: 1
WEAKNESS: 1
FOCAL WEAKNESS: 0
CHILLS: 0
VOMITING: 0
CONSTITUTIONAL NEGATIVE: 1
SPUTUM PRODUCTION: 0
SENSORY CHANGE: 0
DIAPHORESIS: 0
DIARRHEA: 0

## 2018-08-13 ASSESSMENT — PAIN SCALES - GENERAL
PAINLEVEL_OUTOF10: 0

## 2018-08-13 NOTE — PROGRESS NOTES
Pulmonary Critical Care Progress Note        Admit:  8/7/2018    Chief Complaint: Altered LOC/presumed SOB, intubated prior to arrival    History of Present Illness:   The entire history is obtained from healthcare providers and the medical record as this lady cannot give me any history.  This lady has a history of hypertension and sleep apnea.  Apparently she was sick on Sunday.  Monday she was hot and diaphoretic and did not feel good.  She declined to go to the hospital at that time.  Monday night she slept on the couch and apparently fell onto the floor during the night.  This morning she was on the floor, but her family felt that she was sleeping.  This evening, EMS was activated when the family realized that she had not moved from the floor all day.  There was evidence of emesis at the scene.  EMS found the patient have a temperature of 104°F and she was breathing 40 times per minute.  She was intubated and transported to [facility].    Review of Systems   Constitutional: Positive for malaise/fatigue. Negative for chills, diaphoresis and fever.   HENT: Negative for congestion, nosebleeds and sore throat.    Respiratory: Positive for cough and shortness of breath (Improved). Negative for sputum production.    Cardiovascular: Negative for chest pain and palpitations.   Gastrointestinal: Negative for abdominal pain, diarrhea, nausea and vomiting.   Genitourinary: Negative for flank pain and hematuria.   Musculoskeletal: Negative for back pain.   Skin: Negative for rash.   Neurological: Positive for weakness. Negative for sensory change, speech change, focal weakness and headaches.   Psychiatric/Behavioral: Negative for depression. The patient is not nervous/anxious.        Interval Events:  24 hour interval history reviewed     Extubated yesterday  A&O x3  No pain  Slow  SR 70s  SBp 150s+  Afeb   CVC  Mobilized  UO adequate   BMS pulled out  2 lpm NC  PEP, IS 1100  CXR better LLL ATX  Anti-10A level low    Area  follow-up  Oxygenation acceptable throughout the day and low flow nasal cannula  Patient still fairly weak and needs assistance with any mobility  Hypertension noted throughout the day    PFSH:  No change.    General: Obese, appears her stated age, appears acutely and chronically ill    Skin: Warm and dry, rash or lesion, normal capillary refill    Respiratory:     Pulse Oximetry: 93 %  Nasal cannula O2          Exam: unlabored respirations, no intercostal retractions or accessory muscle use    Breath sounds at bases bilaterally   Bilateral coarse rales at the bases   No wheezing    ImagingCXR  I have personally reviewed the chest x-ray my impression is  noted above and films are reviewed with Team on rounds     Recent Labs      08/12/18   0448   ISTATAPH  7.476   ISTATAPCO2  37.0   ISTATAPO2  66   ISTATATCO2  28   [id number]  94   ISTATARTHCO3  27.3*   ISTATARTBE  4*   ISTATTEMP  38.9 C   ISTATFIO2  30   ISTATSPEC  Arterial   ISTATAPHTC  7.447   [id number]  75       HemoDynamics:  Pulse: 71, Heart Rate (Monitored): 71  NIBP: 148/70  CVP (mm Hg): 5 MM HG    Exam: regular rate and rhythm, no ectopy, murmur, intact pulses, trace edema  Imaging: Available data reviewed   ECHO 8/8  CONCLUSIONS  No prior study is available for comparison.   Normal left ventricular systolic function.  Left ventricular ejection fraction is visually estimated to be 60%.  Grade I diastolic dysfunction.  The right ventricle was normal in size and function.  No significant valve disease or flow abnormalities.     Recent Labs      08/12/18   0524   CPKTOTAL  427*       Neuro:  GCS Total Hugo Coma Score: 15       Exam: no focal deficits noted mental status intact, slow but appropriate  Imaging: Available data reviewed    Fluids:  Intake/Output       08/11/18 0700 - 08/12/18 0659 08/12/18 0700 - 08/13/18 0659 08/13/18 0700 - 08/14/18 0659      3394-1145 2727-4474 Total 8533-6234 6559-2508 Total 3914-1699 8413-2682 Total       Intake    P.O.   --  0 0  --  0 0  --  -- --    P.O. -- 0 0 -- 0 0 -- -- --    I.V.  663.8  642.4 1306.2  393.7  120 513.7  --  -- --    Magnesium Sulfate Volume 100 -- 100 -- -- -- -- -- --    Propofol Volume 463.8 482.4 946.2 293.7 0 293.7 -- -- --    IV Piggyback Volume (IV Piggyback) 100 -- 100 -- -- -- -- -- --    IV Piggyback Volume (IV Piggyback) -- 100 100 100 -- 100 -- -- --    IV Volume (LR) -- 60 60 -- 120 120 -- -- --    Other  120  -- 120  60  -- 60  --  -- --    Medications (P.O./ Enteral Liquids) 120 -- 120 60 -- 60 -- -- --    Enteral  750  600 1350  420  620 1040  --  -- --    Free Water / Tube Flush 150 -- 150 120 -- 120 -- -- --    Intake (mL) (Enteral Tube Left Nare Cortrak Gastric Feeding Tube) -- -- -- -- 420 420 -- -- --    Intake (mL) ([REMOVED] Enteral Tube Left Nare Cortrak Gastric Feeding Tube)  100 -- 100 -- -- --    Intake (mL) ([REMOVED] Enteral Tube Right Nare Cortrak Gastric Feeding Tube) -- -- -- 200 200 400 -- -- --    Total Intake 1533.8 1242.4 2776.2 873.7 740 1613.7 -- -- --       Output    Urine  900  770 1670  1025  2800 3825  --  -- --    Output (mL) (Urinary Catheter Indwelling Catheter)  1025 2800 3825 -- -- --    Drains  --  0 0  --  0 0  --  -- --    Residual Amount (ml) (Discarded) -- 0 0 -- 0 0 -- -- --    Stool  --  60 60  60  -- 60  --  -- --    Measurable Stool (mL) -- 60 60 60 -- 60 -- -- --    Total Output  1085 2800 3885 -- -- --       Net I/O     633.8 412.4 1046.2 -211.3 -2060 -2271.3 -- -- --        Weight: (!) 136.9 kg (301 lb 13 oz)  Recent Labs      08/11/18   0322  08/12/18   0524  08/13/18   0436   SODIUM  143  141  143   POTASSIUM  3.4*  3.5*  3.7   CHLORIDE  108  106  107   CO2  26  27  30   BUN  22  20  16   CREATININE  0.82  0.55  0.49*   MAGNESIUM  1.9  1.8  1.8   PHOSPHORUS  3.7  3.9  3.6   CALCIUM  8.4*  8.1*  8.4*       GI/Nutrition:  Exam: Obese, abdomen is soft and non-tender, normal active bowel sounds, no CVAT  Imaging:  Available data reviewed  taking PO  Liver Function  Recent Labs      18   ALTSGPT   --    --   108*   ASTSGOT   --    --   85*   ALKPHOSPHAT   --    --   65   TBILIRUBIN   --    --   0.5   PREALBUMIN   --    --   21.0   GLUCOSE  90  92  96       Heme:  Recent Labs      18   RBC  4.36  4.21  4.73   HEMOGLOBIN  12.9  12.5  13.9   HEMATOCRIT  39.9  38.9  44.0   PLATELETCT  192  193  222       Infectious Disease:  Monitored Temp 2  Av.9 °C (98.4 °F)  Min: 36.7 °C (98.1 °F)  Max: 37.3 °C (99.1 °F)  Micro: antibiotics reviewed and cultures reviewed  Recent Labs      18   WBC  7.7  6.8  8.4   NEUTSPOLYS  49.10  49.90  64.20   LYMPHOCYTES  38.60  37.00  21.90*   MONOCYTES  9.80  7.70  8.80   EOSINOPHILS  1.40  2.80  2.70   BASOPHILS  0.50  0.70  0.60   ASTSGOT   --    --   85*   ALTSGPT   --    --   108*   ALKPHOSPHAT   --    --   65   TBILIRUBIN   --    --   0.5     Current Facility-Administered Medications   Medication Dose Frequency Provider Last Rate Last Dose   • magnesium sulfate IVPB premix 2 g  2 g Once Dusty Muñiz M.D.       • potassium bicarbonate (KLYTE) 25 MEQ effervescent tablet TBEF 25 mEq  25 mEq Once Dusty Muñiz M.D.       • potassium bicarbonate (KLYTE) 25 MEQ effervescent tablet TBEF 25 mEq  25 mEq DAILY Narendra Granger M.D.   25 mEq at 18   • labetalol (NORMODYNE,TRANDATE) injection 10-20 mg  10-20 mg Q HOUR PRN Henrry Ying M.D.   10 mg at 18   • hydrALAZINE (APRESOLINE) injection 10-20 mg  10-20 mg Q4HRS PRN Henrry Ying M.D.   20 mg at 18   • carvedilol (COREG) tablet 3.125 mg  3.125 mg BID WITH MEALS Taz Castro M.D.   3.125 mg at 18 1747   • Pharmacy Consult: Enteral tube feeding - review meds/change route/product selection  1 Each PRN Yris Mabry M.D.       • enoxaparin  (LOVENOX) inj 40 mg  40 mg Q12HRS Remi Martinez D.O.   40 mg at 08/13/18 0540   • lactated ringers infusion   Continuous Remi Martinez D.O.   Stopped at 08/11/18 0700   • Respiratory Care per Protocol   Continuous RT Henrry Ying M.D.       • MD ALERT...Adult ICU Electrolyte Replacement per Pharmacy Protocol   pharmacy to dose Henrry Ying M.D.       • fentaNYL (SUBLIMAZE) injection 25 mcg  25 mcg Q HOUR PRN Henrry Ying M.D.   25 mcg at 08/12/18 2312   • ipratropium-albuterol (DUONEB) nebulizer solution  3 mL Q2HRS PRN (RT) Henrry Ying M.D.       • ipratropium-albuterol (DUONEB) nebulizer solution  3 mL Q4HRS (RT) Henrry Ying M.D.   3 mL at 08/12/18 1507   • insulin regular (HUMULIN R) injection 2-9 Units  2-9 Units Q6HRS Henrry Ying M.D.   Stopped at 08/08/18 1200    And   • dextrose 50% (D50W) injection 25 mL  25 mL Q15 MIN PRN Henrry Ying M.D.         Last reviewed on 8/8/2018  5:12 PM by Azul Arcos [facility]    Quality  Measures:  Labs reviewed, Medications reviewed and Radiology images reviewed                      Assessment / Plan  Acute hypoxemic respiratory failure, severe.    Likely secondary to strep pneumonia.    Intubated 8/7   Extubated 8/12   RT protocols   IS/mobilize   D/c Fent - Oxy PO PRN   ATX     Encephalopathy/Altered mental status.     Secondary to sepsis.     Improved   Limit sedating medications      Pneumonia.  As above.     On rocephin for total 7 days.     Continue chest physiotherapy/suctioning     Atrial fibrillation.  New onset.     Rate controlled on amiodarone and bb.     BB held several doses, off amiodarton 8/11,      On lovenox bid for px, chads1     Hypertension: echo diastolic dysfunction. For now controlled.   Resume home Bp meds - half dose?     Rhabdomyolysis: downtrending, no further cpk   Monitor    Skin wounds, right shoulder and posterior legs, wound care to follow during  hospitalization     Hypokalemia, continue to supplement - ERP    Morbid obesity/hypertension   Review of systems positive for JENNIFER, will need PSG   Behavioral modification and weight loss encouraged     Prophylaxis    Up lovenox to 60 bid for prophylaxis    Repeat Anti-10a levels 48 hrs    Keep one more day?    The ROS, Physical Exam and Plan were updated today (08/12/18).  Compared with yesterday's note, there are no new changes except as documented above.      Discussed patient condition and risk of morbidity and/or mortality with RN, RT, Pharmacy, , [facility] resident, Charge nurse / hot rounds and Patient.

## 2018-08-13 NOTE — PROGRESS NOTES
Patient pulled cortrak out for the second time. Pt also pulling on other lines and undressing; confusion present. Dr. Roy updated and orders received to replace and restrain patient

## 2018-08-13 NOTE — PROGRESS NOTES
[facility] ICU Progress Note      Admit Date: 8/7/2018  ICU Day: 7    Resident(s): Dusty Muñiz  Attending: HUMPHREY VELIZ/ Dr. Paz    Date & Time:   8/13/2018   12:47 PM       Patient ID:    Name:             Salome Quinones     YOB: 1960  Age:                 58 y.o.  female   MRN:               [id number]    HPI:  Patient is a 58 year old lady with past medical history significant for hypertension was brought to the ED after being found unresponsive on the floor by her , intubated at the scene for respiratory distress. Up on arrival, febrile with temp of 38.8 F, tachycardic with -120. She had leucocytosis of 16.3 on admission with lactate of 2, and scored 4/4 on SIRS and 2 on qSOFA. CT Head did not reveal any acute intracranial abnormality. CT C-spine showed some degenerative changes. CXR revealed bibasilar atelectasis. CTPA was negative for pulmonary embolus but did show some prominent pulmonary vessels in keeping with pulmonary hypertension. A lumbar puncture was performed. CSF showed clear fluid, WBC 36, , with mildly elevated protein of 88. Gram stain and culture negative so far. Blood cultures negative so far. Bronchoscopy revealed yellow secretions bilaterally, BAL sent.  Urine analysis revealed nitrites, leucocyte esterase, WBC 5-10, nil on gram stain. She was started on ampicillin, ceftriaxone, vancomycin and acyclovir for severe sepsis with possible sources being meningitis vs aspiration pneumonia vs UTI. Admitted to ICU for further management.     HPI obtained from Dr. Reed note.     Consultants:       PMA: Dr. Paz  Infectious disease    Interval Events:    - Patient tolerating extubation well, PT/OT orders placed. Patient was mobilized up to chair today.   - Electrolytes repleted.   - Patient has been hypertensive, patients home doses of antihypertensives were restarted at reduced doses.   - Patient was transitioned to oral pain medication.      Patient  "required reorientation this AM. Difficult to obtain reliable ROS    Review of Systems   Unable to perform ROS: Mental acuity   Constitutional: Negative.    HENT: Negative.    Eyes: Negative.    Respiratory: Negative.    Cardiovascular: Negative.    Gastrointestinal: Negative.    Genitourinary: Negative.    Musculoskeletal: Negative.    Skin: Negative.    Neurological: Negative.    Endo/Heme/Allergies: Negative.    Psychiatric/Behavioral: Negative.        PHYSICAL EXAM  Vitals:    08/13/18 1016 08/13/18 1100 08/13/18 1150 08/13/18 1200   BP:       Pulse: 68 69 65 67   Resp: (!) 22 19 17 (!) 24   Temp:       SpO2: 90% 91% 94% 92%   Weight:       Height:         Body mass index is 42.09 kg/m².  /82   Pulse 67   Temp 36.8 °C (98.2 °F)   Resp (!) 24   Ht 1.803 m (5' 11\")   Wt (!) 136.9 kg (301 lb 13 oz)   SpO2 92%   BMI 42.09 kg/m²   O2 therapy: Pulse Oximetry: 92 %, O2 (LPM): 2, O2 Delivery: Silicone Nasal Cannula    Physical Exam   Constitutional: She is well-developed, well-nourished, and in no distress. No distress.   HENT:   Head: Normocephalic and atraumatic.   Eyes: Pupils are equal, round, and reactive to light. EOM are normal.   Neck: Normal range of motion.   Cardiovascular: Normal rate and normal heart sounds.    Pulmonary/Chest: Effort normal and breath sounds normal. No respiratory distress.   Abdominal: Soft. Bowel sounds are normal.   Musculoskeletal: She exhibits edema.   Neurological: She is alert. No cranial nerve deficit.   Patient requires frequent reorientation.   Psychiatric: Her mood appears not anxious. She is not agitated.       Respiratory:  Kenney Vent Mode: Spont  Respiration: (!) 24, Pulse Oximetry: 92 %, O2 Daily Delivery Respiratory : Silicone Nasal Cannula    Chest Tube Drains:    Recent Labs      08/12/18   0448   ISTATAPH  7.476   ISTATAPCO2  37.0   ISTATAPO2  66   ISTATATCO2  28   [id number]  94   ISTATARTHCO3  27.3*   ISTATARTBE  4*   ISTATTEMP  38.9 C   ISTATFIO2  30 "   ISTATSPEC  Arterial   ISTATAPHTC  7.447   [id number]  75       HemoDynamics:  Pulse: 67, Heart Rate (Monitored): 65 NIBP: (!) 163/91 CVP (mm Hg): (!) 11 MM HG    Neuro:      Fluids:    Date 18 0700 - 18 0659   Shift 6194-6285 0416-3593 5169-7148 24 Hour Total   I  N  T  A  K  E   Other 60   60      Medications (P.O./ Enteral Liquids) 60   60    Enteral 660   660      Free Water / Tube Flush 180   180      Intake (mL) (Enteral Tube Left Nare Cortrak Gastric Feeding Tube) 480   480    Shift Total 720   720   O  U  T  P  U  T   Urine 450   450      Output (mL) (Urinary Catheter Indwelling Catheter) 450   450    Shift Total 450   450      270        Intake/Output Summary (Last 24 hours) at 18 1111  Last data filed at 18 1000   Gross per 24 hour   Intake          1576.21 ml   Output             3940 ml   Net         -2363.79 ml       Weight: (!) 136.9 kg (301 lb 13 oz)  Body mass index is 42.09 kg/m².    Recent Labs      18   SODIUM  143  141  143   POTASSIUM  3.4*  3.5*  3.7   CHLORIDE  108  106  107   CO2  26  27  30   BUN  22  20  16   CREATININE  0.82  0.55  0.49*   MAGNESIUM  1.9  1.8  1.8   PHOSPHORUS  3.7  3.9  3.6   CALCIUM  8.4*  8.1*  8.4*       GI/Nutrition:  Recent Labs      18   ALTSGPT   --    --   108*   ASTSGOT   --    --   85*   ALKPHOSPHAT   --    --   65   TBILIRUBIN   --    --   0.5   PREALBUMIN   --    --   21.0   GLUCOSE  90  92  96       Heme:  Recent Labs      18   RBC  4.36  4.21  4.73   HEMOGLOBIN  12.9  12.5  13.9   HEMATOCRIT  39.9  38.9  44.0   PLATELETCT  192  193  222       Infectious Disease:  Monitored Temp 2  Av.8 °C (98.3 °F)  Min: 36.4 °C (97.5 °F)  Max: 37.3 °C (99.1 °F)  Temp  Av.8 °C (98.2 °F)  Min: 36.8 °C (98.2 °F)  Max: 36.8 °C (98.2 °F)  Recent Labs      18   0322  18   0524   08/13/18   0436   WBC  7.7  6.8  8.4   NEUTSPOLYS  49.10  49.90  64.20   LYMPHOCYTES  38.60  37.00  21.90*   MONOCYTES  9.80  7.70  8.80   EOSINOPHILS  1.40  2.80  2.70   BASOPHILS  0.50  0.70  0.60   ASTSGOT   --    --   85*   ALTSGPT   --    --   108*   ALKPHOSPHAT   --    --   65   TBILIRUBIN   --    --   0.5       Meds:  • losartan  25 mg     • hydrALAZINE  10 mg     • oxyCODONE immediate-release  5 mg      Or   • oxyCODONE immediate-release  10 mg     • enoxaparin (LOVENOX) injection  60 mg     • potassium bicarbonate  25 mEq     • labetalol  10-20 mg     • hydrALAZINE  10-20 mg     • carvedilol  3.125 mg     • Pharmacy  1 Each     • LR   Stopped (08/11/18 0700)   • Respiratory Care per Protocol       • MD ALERT...Adult ICU Electrolyte Replacement per Pharmacy Protocol       • ipratropium-albuterol  3 mL          Procedures:  None today    Imaging:  DX-CHEST-PORTABLE (1 VIEW)   Final Result      1.  Status post extubation.   2.  Tubes and lines are in stable position.   3.  Left basilar atelectasis.   4.  Elevated right dome of elevated right dome of diaphragm.      DX-ABDOMEN FOR TUBE PLACEMENT   Final Result      The tip of the Cortrak tube projects over the antrum of the stomach      DX-ABDOMEN FOR TUBE PLACEMENT   Final Result      Feeding tube appears to extend through the region of the stomach with tip at proximal end of the duodenum near the pylorus.      DX-CHEST-PORTABLE (1 VIEW)   Final Result      Stable examination.      DX-CHEST-PORTABLE (1 VIEW)   Final Result      Stable bilateral lower lobe atelectasis and/or consolidation.      DX-CHEST-PORTABLE (1 VIEW)   Final Result      Parahilar and bibasilar opacities are mildly improved on the right and increased on the left compared to prior.      Interstitial prominence likely represents interstitial edema.      Trace left pleural effusion is not excluded.         [id number] VIEW   Final Result      Enteric tube tip projects over the distal gastric  body/antrum.      DX-CHEST-PORTABLE (1 VIEW)   Final Result      No significant change from prior exam.      ECHOCARDIOGRAM COMP W/O CONT   Final Result      DX-CHEST-PORTABLE (1 VIEW)   Final Result      Perihilar and bibasilar opacities likely represent atelectasis.      Endotracheal tube now projects at the level of the clavicular heads.         CT-CTA CHEST PULMONARY ARTERY W/ RECONS   Final Result      1.  No filling defects within the pulmonary arteries to indicate emboli.   2.  Prominent central pulmonary vessels suggesting pulmonary hypertension.   3.  RIGHT mainstem intubation with LEFT lower lobe atelectasis.      These findings were discussed with EUSEBIO SILVERIO on 8/8/2018 12:52 AM.         CT-CSPINE WITHOUT PLUS RECONS   Final Result      1.  Moderate to severe multilevel degenerative change of cervical spine.   2.  No acute fracture or subluxation.      CT-HEAD W/O   Final Result      1.  No acute intracranial abnormality.   2.  LEFT parietal scalp swelling.   3.  Chronic paranasal sinus disease.      DX-CHEST-PORTABLE (1 VIEW)   Final Result      1.  Supportive tubing as described above.   2.  Hypoinflation with elevated RIGHT hemidiaphragm.   3.  LEFT lung base atelectasis.          Problem and Plan:      * Severe sepsis (HCC)   Assessment & Plan    Severe sepsis secondary to pneumococcal pneumonia, which lead to AMS. Patient treated with 7days of rocephin, ID following. Patient extubated yesterday, improving.   Plan  - CorTrak with TF          Encephalopathy acute secondary to hypoxia from pneumonia   Assessment & Plan    Pneumococcal Pneumonia, completed 7 day course of Ceftriaxone. ID following. Patients mentation improving after extubation. Patient still needs reorientation at times.           Streptococcal pneumonia (HCC)   Assessment & Plan    Patient treated with 7 days of Rocephin, ID on case        Acute hypoxemic respiratory failure (HCC)   Assessment & Plan    Patient was extubated 8/12/18.  Tolerating well. Saturating at 92% on 2L, RR 22, not in visible respiratory distress.         Atrial fibrillation with RVR (HCC)   Assessment & Plan    New onset this admission in the context of sepsis, [id number]  2. Patient converted to NSR. Patient was trreated with Amiodarone, this was D/C  Plan  - On telemetry          Hyperglycemia   Assessment & Plan    Resolved. Patient has not received a dose of Insulin.   Plan  - D/C ISS and accuchecks           Atelectasis   Assessment & Plan    Please refer to Acute hypoxemic respiratory failure A&P          Abnormal urinalysis   Assessment & Plan    U/A on 8/7/2018 : nitrite positive, small amount leucocyte esterase, WBC 5-10, no bacteria. Patient received 7 days of rocephin.         Elevated troponin   Assessment & Plan    On admission trop 0.12.  EKG no st/t changes. No further trending, likely secondary to demand due to sepsis.         Rhabdomyolysis   Assessment & Plan    Trending down, last level 427.         Hypocalcemia   Assessment & Plan    Resolved. Adjusted calcium within normal range.           Hypomagnesemia   Assessment & Plan    Resolved. Maintain Mg at ~2.         Hypokalemia   Assessment & Plan    Resolved. Will maintin K ~4        Pulmonary hypertension (HCC)   Assessment & Plan    CTPA showed prominent pulmonary vessels suggesting pulmonary hypertension. TTE on 8/8/2018 : normal LV systolic function, EF 60%         Essential hypertension- (present on admission)   Assessment & Plan     this AM. Patients antihypertensive home medications, Hydralazone and Losartan held on admission because of sepsis and HAROON.   Plan  - Resumed patients home losartan and hydralazine, at reduced doses.            DISPO: Will continue to monitor mentation, consider transfer to floor later today or tomorrow.     CODE STATUS: Full Code    Quality Measures:  Raygoza Catheter: In place  DVT Prophylaxis: Enoxaparin  Ulcer Prophylaxis: None  Antibiotics: None, completed  course of Rocehpin  Lines: PIV and CVC

## 2018-08-13 NOTE — CARE PLAN
Problem: Bowel/Gastric:  Goal: Will not experience complications related to bowel motility    Intervention: Assess baseline bowel pattern  Normoactive x4  Intervention: Implement interventions to promote bowel evacuation if inadequate bowel movements in past 48 hours  Early ambulation and senna given  Intervention: Implement Bowel Protocol, if applicable  Will implement if needed. Not currently needed      Problem: Pain Management  Goal: Pain level will decrease to patient's comfort goal    Intervention: Follow pain managment plan developed in collaboration with patient and Interdisciplinary Team  No pain management has been needed. Pt reports no pain

## 2018-08-13 NOTE — DISCHARGE PLANNING
Medical SW    Sw attended AM IDT Rounds.    RN reports, pt found down w/ sepsis pneumonia, extubated yesterday, A/O x3, unsure of situation, needs continual reorientation, coming off sedation, mobilized to chair this AM, PT/OT to see    Plan: Sw to assist w/ d/c planning as needed.

## 2018-08-13 NOTE — CARE PLAN
Problem: Respiratory:  Goal: Respiratory status will improve    Intervention: Assess and monitor pulmonary status  Lungs clear-diminished in bases. Productive cough with moderate white secretions and oral suctioning utilized. Oxygen weaned to .5L NC. Family states Pt wears CPAP at night.        Problem: Mobility  Goal: Risk for activity intolerance will decrease  Ambulated to chair and tolerated sitting in chair over shift. Mod-max assistance x2

## 2018-08-13 NOTE — PROGRESS NOTES
12 hour chart check    MS: SB-SR 58-75. SB until extubated at 1600 then increased to SR  .20/.10/.48    Skin Assessment: Skin fragile with lower extremities flaky. Healing DTI on R posterior heel that appears brown/pink no drainage. Skin tear on Right upper back/shoulder, BARBIE with heel float boot on bilateral feet. Appears red/pink in color with scant clear secretions. mepilex in place. Moist pannus, intact at this time, slight pink but blanches. interdry in place. Left upper arm skin tear with excoriation around tear. mepilex lite CDI. DTI on left elbow. Pink and does not hussein. mepilex lite in place. Bilateral pillows in use under arms to protect from cords and side rails.

## 2018-08-13 NOTE — WOUND TEAM
[facility]  Inpatient Services  Initial Wound and Skin Care Evaluation    Admission Date: 8/7/2018     Consult Date: 08/12/2018 @ 1644   [facility], [facility], [facility]: Reviewed    Unit where seen by Wound Team: T630/00     WOUND CONSULT RELATED TO:  Abrasion to left elbow     SUBJECTIVE:  Pt just stares at this RN no response to questions      Self Report / Pain Level:  No S/S of discomfort or distress       OBJECTIVE:  Pt sitting up in chair. Heel float boots at bedside. Cortrak in place    WOUND TYPE, LOCATION, CHARACTERISTICS (Pressure Injuries: location, stage, POA or date identified)    Location and type of wound: Left lateral elbow partial thickness wound of unknown etiology         Periwound:        Pink, intact      Drainage:        NONE      Tissue Type and %:       Red/yellow     Wound Edges:         Attached  Odor:                   NONE  Exposed structure(s):                NONE  S&S of Infection:      NONE      Measurements:    (Obtained 08/13/2018)  Length:       0.4 cm  Width:        0.3 cm   Depth:       0.1 cm  Tracts/Undermining:     NONE    Location and type of wound: Right Posterior lower leg (achilles area) healing/scarred venous stasis ulcer, POA        Periwound:        Pink, intact      Drainage:        NONE      Tissue Type and %:       Brown, calloused   Wound Edges:         Attached  Odor:                   NONE  Exposed structure(s):                NONE  S&S of Infection:      NONE      Measurements:    (Obtained 08/08/2018)  Length:       2 cm  Width:        2 cm   Depth:       NA  Tracts/Undermining:     NONE    Vascular:    Dorsal Pedal pulses:   bilateral palpable and bounding, Dopplered and appeared to sound multiphasic  Posterior tib pulses:   Unable to palpate bilaterally. Dopplered and appear to wound multiphasic    Lab Values:    WBC:       WBC   Date/Time Value Ref Range Status   08/13/2018 04:36 AM 8.4 4.8 - 10.8 K/uL Final     AIC:      Lab Results   Component Value Date/Time     HBA1C 5.5 04/30/2017 06:37 PM      INTERVENTIONS BY WOUND TEAM:  Wound RN in to see patient. Pt sitting up in chair at bedside. Family at bedside. Pt with flat affect does not respond to questions. Pts family agreeable to assessment. Elbow foam removed to reveal a small open area. Per chart review picture obtained yesterday was of a reddened area. The area has now slightly opened up and there is a little bit of yellow to the base of the wound. Wound appears to be dry. Measurements and picture obtained. The severo-wound was prepped with no sting skin barrier. A piece of honey colloid was cut to fit over the wound bed and was secured in place with an adhesive foam dressing. Pts R achilles area wound was assessed as well. Per family pt used to have an open venous stasis ulcer in that area. The wound has now healed but is discolored the area is slightly hardened and elevated as if it is calloused or has scar tissue to the area. The wound was left open to air with heel float boots when in bed to prevent the area from reopening.     Dressing selection:  Honey colloid, adhesive foam         Interdisciplinary consultation: Patient, Bedside RN (Gisell), Pts son at bedside    EVALUATION: Pt is a critically ill obese woman that has a history of venous stasis ulcers. Wounds are now all healed. Pt has calloused/scarred areas to her achilles area. Pt left elbow has a partial thickness wound. The area was just red yesterday but has since open up slightly. The wound does hussein but is dry with a small amount of yellow over the wound bed. Honey colloid applied to cleanse and autolytically debride due to its high osmolarity. As well as help lower overall wound pH, while promoting a moisture-balanced environment conducive to wound healing.    Factors affecting wound healing: Critically ill, Hyperglycemia, A. Fib, HTN  Goals: Steady decrease in wound area and depth weekly.    NURSING PLAN OF CARE ORDERS (X):    Dressing changes: See  Dressing Maintenance orders: X  Skin care: See Skin Care orders: X  Rectal tube care: See Rectal Tube Care orders:   Other orders:    RSKIN: CURRENT (X) ORDERED (O):   Q shift Timothy:  X  Q shift pressure point assessments:  X  Pressure redistribution mattress X           ABBIE          Bariatric ABBIE         Bariatric foam           Heel float boots X at bedside         Float Heels off Bed with Pillows               Barrier wipes         Barrier Cream         Barrier paste          Sacral silicone dressing         Silicone O2 tubing         Anchorfast         Cannula fixation Device (Tender )          Gray Foam Ear protectors           Trach with Optifoam split foam                 Waffle cushion        Waffle Overlay X        Rectal tube or BMS         Antifungal tx      Interdry          Turn q 2 hours        Up to chair X       Ambulate X     PT/OT        Dietician        Diabetes Education      PO     TF X - Peptamen    TPN     NPO   # days   Other        WOUND TEAM PLAN OF CARE (X):   NPWT change 3 x week:        Dressing changes by wound team:       Follow up as needed:  X     Other (explain):     Anticipated discharge plans (X):   SNF:           Home Care:           Outpatient Wound Center:            Self Care:            Other: X, pt lives with SO of 20 years outside of [place] and has 3 sons and several other family members locally.

## 2018-08-13 NOTE — PROGRESS NOTES
12 hour chart check    MS:  65-72, .18/.10/.44    Skin assessment: R heel abrasion flaky with healed skin over top, pink/brown, no drainage. Right shoulder/back tear, dressing changed, pink/red with scant serous drainage, mepilex lite changed. R wrist skin tear, scabbed, red/brown, no secretions, JESSICA. Left upper arm tear, red/pink, dressing CDI. L elbow abrasion pink/yellow, wound consult given and dressing protocol initiated. Bilateral nares scabbed, jessica.

## 2018-08-14 ENCOUNTER — APPOINTMENT (OUTPATIENT)
Dept: RADIOLOGY | Facility: MEDICAL CENTER | Age: 58
DRG: 853 | End: 2018-08-14
Attending: INTERNAL MEDICINE

## 2018-08-14 LAB
ANION GAP SERPL CALC-SCNC: 7 MMOL/L (ref 0–11.9)
BASOPHILS # BLD AUTO: 0.6 % (ref 0–1.8)
BASOPHILS # BLD: 0.06 K/UL (ref 0–0.12)
BUN SERPL-MCNC: 23 MG/DL (ref 8–22)
CALCIUM SERPL-MCNC: 8.5 MG/DL (ref 8.5–10.5)
CHLORIDE SERPL-SCNC: 105 MMOL/L (ref 96–112)
CO2 SERPL-SCNC: 29 MMOL/L (ref 20–33)
CREAT SERPL-MCNC: 0.49 MG/DL (ref 0.5–1.4)
EOSINOPHIL # BLD AUTO: 0.28 K/UL (ref 0–0.51)
EOSINOPHIL NFR BLD: 2.9 % (ref 0–6.9)
ERYTHROCYTE [DISTWIDTH] IN BLOOD BY AUTOMATED COUNT: 52.1 FL (ref 35.9–50)
GLUCOSE SERPL-MCNC: 98 MG/DL (ref 65–99)
HCT VFR BLD AUTO: 44.8 % (ref 37–47)
HGB BLD-MCNC: 14.2 G/DL (ref 12–16)
IMM GRANULOCYTES # BLD AUTO: 0.3 K/UL (ref 0–0.11)
IMM GRANULOCYTES NFR BLD AUTO: 3.1 % (ref 0–0.9)
LYMPHOCYTES # BLD AUTO: 1.87 K/UL (ref 1–4.8)
LYMPHOCYTES NFR BLD: 19.6 % (ref 22–41)
MAGNESIUM SERPL-MCNC: 1.9 MG/DL (ref 1.5–2.5)
MCH RBC QN AUTO: 29.3 PG (ref 27–33)
MCHC RBC AUTO-ENTMCNC: 31.7 G/DL (ref 33.6–35)
MCV RBC AUTO: 92.6 FL (ref 81.4–97.8)
MONOCYTES # BLD AUTO: 1.12 K/UL (ref 0–0.85)
MONOCYTES NFR BLD AUTO: 11.7 % (ref 0–13.4)
NEUTROPHILS # BLD AUTO: 5.93 K/UL (ref 2–7.15)
NEUTROPHILS NFR BLD: 62.1 % (ref 44–72)
NRBC # BLD AUTO: 0 K/UL
NRBC BLD-RTO: 0 /100 WBC
PHOSPHATE SERPL-MCNC: 3 MG/DL (ref 2.5–4.5)
PLATELET # BLD AUTO: 273 K/UL (ref 164–446)
PMV BLD AUTO: 10 FL (ref 9–12.9)
POTASSIUM SERPL-SCNC: 4 MMOL/L (ref 3.6–5.5)
RBC # BLD AUTO: 4.84 M/UL (ref 4.2–5.4)
SODIUM SERPL-SCNC: 141 MMOL/L (ref 135–145)
WBC # BLD AUTO: 9.6 K/UL (ref 4.8–10.8)

## 2018-08-14 PROCEDURE — 700102 HCHG RX REV CODE 250 W/ 637 OVERRIDE(OP): Performed by: INTERNAL MEDICINE

## 2018-08-14 PROCEDURE — 700111 HCHG RX REV CODE 636 W/ 250 OVERRIDE (IP): Performed by: STUDENT IN AN ORGANIZED HEALTH CARE EDUCATION/TRAINING PROGRAM

## 2018-08-14 PROCEDURE — 84100 ASSAY OF PHOSPHORUS: CPT

## 2018-08-14 PROCEDURE — G8988 SELF CARE GOAL STATUS: HCPCS | Mod: CI

## 2018-08-14 PROCEDURE — G8996 SWALLOW CURRENT STATUS: HCPCS | Mod: CI

## 2018-08-14 PROCEDURE — 700102 HCHG RX REV CODE 250 W/ 637 OVERRIDE(OP): Performed by: STUDENT IN AN ORGANIZED HEALTH CARE EDUCATION/TRAINING PROGRAM

## 2018-08-14 PROCEDURE — A9270 NON-COVERED ITEM OR SERVICE: HCPCS | Performed by: INTERNAL MEDICINE

## 2018-08-14 PROCEDURE — 97165 OT EVAL LOW COMPLEX 30 MIN: CPT

## 2018-08-14 PROCEDURE — 700111 HCHG RX REV CODE 636 W/ 250 OVERRIDE (IP): Performed by: INTERNAL MEDICINE

## 2018-08-14 PROCEDURE — G8987 SELF CARE CURRENT STATUS: HCPCS | Mod: CK

## 2018-08-14 PROCEDURE — 92610 EVALUATE SWALLOWING FUNCTION: CPT

## 2018-08-14 PROCEDURE — 85025 COMPLETE CBC W/AUTO DIFF WBC: CPT

## 2018-08-14 PROCEDURE — 770020 HCHG ROOM/CARE - TELE (206)

## 2018-08-14 PROCEDURE — G8997 SWALLOW GOAL STATUS: HCPCS | Mod: CH

## 2018-08-14 PROCEDURE — 83735 ASSAY OF MAGNESIUM: CPT

## 2018-08-14 PROCEDURE — A9270 NON-COVERED ITEM OR SERVICE: HCPCS | Performed by: STUDENT IN AN ORGANIZED HEALTH CARE EDUCATION/TRAINING PROGRAM

## 2018-08-14 PROCEDURE — 80048 BASIC METABOLIC PNL TOTAL CA: CPT

## 2018-08-14 PROCEDURE — 94668 MNPJ CHEST WALL SBSQ: CPT

## 2018-08-14 PROCEDURE — 99233 SBSQ HOSP IP/OBS HIGH 50: CPT | Performed by: INTERNAL MEDICINE

## 2018-08-14 RX ORDER — MAGNESIUM SULFATE HEPTAHYDRATE 40 MG/ML
2 INJECTION, SOLUTION INTRAVENOUS ONCE
Status: COMPLETED | OUTPATIENT
Start: 2018-08-14 | End: 2018-08-14

## 2018-08-14 RX ORDER — HYDRALAZINE HYDROCHLORIDE 25 MG/1
25 TABLET, FILM COATED ORAL EVERY 8 HOURS
Status: DISCONTINUED | OUTPATIENT
Start: 2018-08-14 | End: 2018-08-14

## 2018-08-14 RX ORDER — OXYCODONE HYDROCHLORIDE 5 MG/1
5 TABLET ORAL EVERY 6 HOURS PRN
Status: DISCONTINUED | OUTPATIENT
Start: 2018-08-14 | End: 2018-08-16 | Stop reason: HOSPADM

## 2018-08-14 RX ORDER — LOSARTAN POTASSIUM 50 MG/1
50 TABLET ORAL 2 TIMES DAILY
Status: DISCONTINUED | OUTPATIENT
Start: 2018-08-14 | End: 2018-08-16 | Stop reason: HOSPADM

## 2018-08-14 RX ORDER — LOSARTAN POTASSIUM 25 MG/1
50 TABLET ORAL 2 TIMES DAILY
Status: DISCONTINUED | OUTPATIENT
Start: 2018-08-14 | End: 2018-08-14

## 2018-08-14 RX ORDER — TRAZODONE HYDROCHLORIDE 50 MG/1
50 TABLET ORAL
Status: DISCONTINUED | OUTPATIENT
Start: 2018-08-14 | End: 2018-08-16 | Stop reason: HOSPADM

## 2018-08-14 RX ORDER — OXYCODONE HYDROCHLORIDE 10 MG/1
10 TABLET ORAL EVERY 6 HOURS PRN
Status: DISCONTINUED | OUTPATIENT
Start: 2018-08-14 | End: 2018-08-16 | Stop reason: HOSPADM

## 2018-08-14 RX ORDER — CARVEDILOL 3.12 MG/1
3.12 TABLET ORAL 2 TIMES DAILY WITH MEALS
Status: DISCONTINUED | OUTPATIENT
Start: 2018-08-14 | End: 2018-08-16 | Stop reason: HOSPADM

## 2018-08-14 RX ORDER — HYDRALAZINE HYDROCHLORIDE 25 MG/1
25 TABLET, FILM COATED ORAL EVERY 8 HOURS
Status: DISCONTINUED | OUTPATIENT
Start: 2018-08-14 | End: 2018-08-16 | Stop reason: HOSPADM

## 2018-08-14 RX ADMIN — CARVEDILOL 3.12 MG: 3.12 TABLET, FILM COATED ORAL at 08:24

## 2018-08-14 RX ADMIN — HYDRALAZINE HYDROCHLORIDE 10 MG: 10 TABLET, FILM COATED ORAL at 06:40

## 2018-08-14 RX ADMIN — HYDRALAZINE HYDROCHLORIDE 10 MG: 20 INJECTION INTRAMUSCULAR; INTRAVENOUS at 09:44

## 2018-08-14 RX ADMIN — LOSARTAN POTASSIUM 50 MG: 25 TABLET, FILM COATED ORAL at 17:25

## 2018-08-14 RX ADMIN — HYDRALAZINE HYDROCHLORIDE 25 MG: 25 TABLET, FILM COATED ORAL at 14:50

## 2018-08-14 RX ADMIN — CARVEDILOL 3.12 MG: 3.12 TABLET, FILM COATED ORAL at 17:26

## 2018-08-14 RX ADMIN — TRAZODONE HYDROCHLORIDE 50 MG: 50 TABLET ORAL at 22:18

## 2018-08-14 RX ADMIN — HYDRALAZINE HYDROCHLORIDE 25 MG: 25 TABLET, FILM COATED ORAL at 22:18

## 2018-08-14 RX ADMIN — ENOXAPARIN SODIUM 60 MG: 100 INJECTION SUBCUTANEOUS at 17:25

## 2018-08-14 RX ADMIN — LOSARTAN POTASSIUM 25 MG: 25 TABLET, FILM COATED ORAL at 06:40

## 2018-08-14 RX ADMIN — POTASSIUM BICARBONATE 25 MEQ: 25 TABLET, EFFERVESCENT ORAL at 06:40

## 2018-08-14 RX ADMIN — MAGNESIUM SULFATE IN WATER 2 G: 40 INJECTION, SOLUTION INTRAVENOUS at 10:30

## 2018-08-14 RX ADMIN — ENOXAPARIN SODIUM 60 MG: 100 INJECTION SUBCUTANEOUS at 05:32

## 2018-08-14 ASSESSMENT — PAIN SCALES - GENERAL
PAINLEVEL_OUTOF10: 0

## 2018-08-14 ASSESSMENT — COGNITIVE AND FUNCTIONAL STATUS - GENERAL
DAILY ACTIVITIY SCORE: 15
DRESSING REGULAR UPPER BODY CLOTHING: A LITTLE
PERSONAL GROOMING: A LITTLE
SUGGESTED CMS G CODE MODIFIER DAILY ACTIVITY: CK
TOILETING: A LOT
HELP NEEDED FOR BATHING: A LOT
EATING MEALS: A LITTLE
DRESSING REGULAR LOWER BODY CLOTHING: A LOT

## 2018-08-14 ASSESSMENT — ENCOUNTER SYMPTOMS
NEUROLOGICAL NEGATIVE: 1
CHILLS: 0
CARDIOVASCULAR NEGATIVE: 1
COUGH: 1
FEVER: 0
NAUSEA: 0
WEAKNESS: 1
DIAPHORESIS: 0
RESPIRATORY NEGATIVE: 1
GASTROINTESTINAL NEGATIVE: 1
DEPRESSION: 0
PSYCHIATRIC NEGATIVE: 1
FOCAL WEAKNESS: 0
SORE THROAT: 0
CONSTITUTIONAL NEGATIVE: 1
EYES NEGATIVE: 1
FLANK PAIN: 0
PALPITATIONS: 0
VOMITING: 0
SPEECH CHANGE: 0
HEADACHES: 0
DIARRHEA: 0
ABDOMINAL PAIN: 0
BACK PAIN: 0
SPUTUM PRODUCTION: 0
SHORTNESS OF BREATH: 1
MUSCULOSKELETAL NEGATIVE: 1
SENSORY CHANGE: 0
NERVOUS/ANXIOUS: 0

## 2018-08-14 ASSESSMENT — PATIENT HEALTH QUESTIONNAIRE - PHQ9
SUM OF ALL RESPONSES TO PHQ9 QUESTIONS 1 AND 2: 0
1. LITTLE INTEREST OR PLEASURE IN DOING THINGS: NOT AT ALL
2. FEELING DOWN, DEPRESSED, IRRITABLE, OR HOPELESS: NOT AT ALL

## 2018-08-14 ASSESSMENT — ACTIVITIES OF DAILY LIVING (ADL): TOILETING: INDEPENDENT

## 2018-08-14 NOTE — PROGRESS NOTES
Monitor Summary:  NSR. HR 70-80s  .18/.10/.44    12hr chart check complete    2 RN Skin assessment: Right shoulder/back tear, dressing in place. Left upper arm tear, red/pink, dressing CDI. L elbow abrasion pink/yellow, wound consult given and dressing protocol in place.

## 2018-08-14 NOTE — PROGRESS NOTES
[facility] ICU Progress Note      Admit Date: 8/7/2018  ICU Day: 8    Resident(s): Dusty Muñiz  Attending: HUMPHREY VELIZ/ Dr. Paz    Date & Time:   8/14/2018   2:54 PM       Patient ID:    Name:             Salome Quinones     YOB: 1960  Age:                 58 y.o.  female   MRN:               [id number]    HPI:  Patient is a 58 year old lady with past medical history significant for hypertension was brought to the ED after being found unresponsive on the floor by her , intubated at the scene for respiratory distress. Up on arrival, febrile with temp of 38.8 F, tachycardic with -120. She had leucocytosis of 16.3 on admission with lactate of 2, and scored 4/4 on SIRS and 2 on qSOFA. CT Head did not reveal any acute intracranial abnormality. CT C-spine showed some degenerative changes. CXR revealed bibasilar atelectasis. CTPA was negative for pulmonary embolus but did show some prominent pulmonary vessels in keeping with pulmonary hypertension. A lumbar puncture was performed. CSF showed clear fluid, WBC 36, , with mildly elevated protein of 88. Gram stain and culture negative so far. Blood cultures negative so far. Bronchoscopy revealed yellow secretions bilaterally, BAL sent.  Urine analysis revealed nitrites, leucocyte esterase, WBC 5-10, nil on gram stain. She was started on ampicillin, ceftriaxone, vancomycin and acyclovir for severe sepsis with possible sources being meningitis vs aspiration pneumonia vs UTI. Admitted to ICU for further management.     HPI obtained from Dr. Reed note.     Consultants:       PMA: Dr. Paz  Infectious disease    Interval Events:    - Patient more alert and oriented this AM  - Pending swallow evaluation  - Patient stable to be transferred to the floor    Review of Systems   Constitutional: Negative.    HENT: Negative.    Eyes: Negative.    Respiratory: Negative.    Cardiovascular: Negative.    Gastrointestinal: Negative.   "  Genitourinary: Negative.    Musculoskeletal: Negative.    Skin: Negative.    Neurological: Negative.    Endo/Heme/Allergies: Negative.    Psychiatric/Behavioral: Negative.        PHYSICAL EXAM  Vitals:    08/14/18 1000 08/14/18 1100 08/14/18 1200 08/14/18 1205   BP:       Pulse: 78 77 78 78   Resp: (!) 22 (!) 21 (!) 26 17   Temp:       SpO2: 91% 95% 94% 96%   Weight:       Height:         Body mass index is 42.09 kg/m².  BP (!) 162/94   Pulse 78   Temp 37.2 °C (99 °F)   Resp 17   Ht 1.803 m (5' 11\")   Wt (!) 136.9 kg (301 lb 13 oz)   SpO2 96%   BMI 42.09 kg/m²   O2 therapy: Pulse Oximetry: 96 %, O2 (LPM): 3, O2 Delivery: Silicone Nasal Cannula    Physical Exam   Constitutional: She is well-developed, well-nourished, and in no distress. No distress.   HENT:   Head: Normocephalic and atraumatic.   Eyes: Pupils are equal, round, and reactive to light. EOM are normal.   Neck: Normal range of motion.   Cardiovascular: Normal rate and normal heart sounds.    Pulmonary/Chest: Effort normal and breath sounds normal. No respiratory distress.   Abdominal: Soft. Bowel sounds are normal.   Musculoskeletal: She exhibits edema.   Neurological: She is alert. No cranial nerve deficit.   Psychiatric: Her mood appears not anxious. She is not agitated.       Respiratory:     Respiration: 17, Pulse Oximetry: 96 %, O2 Daily Delivery Respiratory : Silicone Nasal Cannula    Chest Tube Drains:    Recent Labs      08/12/18   0448   ISTATAPH  7.476   ISTATAPCO2  37.0   ISTATAPO2  66   ISTATATCO2  28   [id number]  94   ISTATARTHCO3  27.3*   ISTATARTBE  4*   ISTATTEMP  38.9 C   ISTATFIO2  30   ISTATSPEC  Arterial   ISTATAPHTC  7.447   [id number]  75       HemoDynamics:  Pulse: 78, Heart Rate (Monitored): 78 Blood Pressure: (!) 162/94, NIBP: 144/79     Neuro:      Fluids:    Date 08/14/18 0700 - 08/15/18 0659   Shift 0807-1806 7846-1975 3437-1477 24 Hour Total   I  N  T  A  K  E   I.V. 160   160      Magnesium Sulfate Volume 100   " 100      IV Volume (LR) 60   60    Enteral 480   480      Intake (mL) (Enteral Tube Left Nare Cortrak Gastric Feeding Tube) 480   480    Shift Total 640   640   O  U  T  P  U  T   Urine 550   550      Output (mL) (Urinary Catheter Indwelling Catheter) 550   550    Drains 0   0      Residual Amount (ml) (Discarded) 0   0    Shift Total 550   550   NET 90   90        Intake/Output Summary (Last 24 hours) at 18 1111  Last data filed at 18 1000   Gross per 24 hour   Intake          1576.21 ml   Output             3940 ml   Net         -2363.79 ml         Body mass index is 42.09 kg/m².    Recent Labs      18   0535   SODIUM  141  143  141   POTASSIUM  3.5*  3.7  4.0   CHLORIDE  106  107  105   CO2  27  30  29   BUN  20  16  23*   CREATININE  0.55  0.49*  0.49*   MAGNESIUM  1.8  1.8  1.9   PHOSPHORUS  3.9  3.6  3.0   CALCIUM  8.1*  8.4*  8.5       GI/Nutrition:  Recent Labs      18   0535   ALTSGPT   --   108*   --    ASTSGOT   --   85*   --    ALKPHOSPHAT   --   65   --    TBILIRUBIN   --   0.5   --    PREALBUMIN   --   21.0   --    GLUCOSE  92  96  98       Heme:  Recent Labs      18   0535   RBC  4.21  4.73  4.84   HEMOGLOBIN  12.5  13.9  14.2   HEMATOCRIT  38.9  44.0  44.8   PLATELETCT  193  222  273       Infectious Disease:  Monitored Temp 2  Av °C (98.6 °F)  Min: 36.9 °C (98.4 °F)  Max: 37.2 °C (99 °F)  Temp  Av.2 °C (99 °F)  Min: 37.2 °C (99 °F)  Max: 37.2 °C (99 °F)  Recent Labs      18   0535   WBC  6.8  8.4  9.6   NEUTSPOLYS  49.90  64.20  62.10   LYMPHOCYTES  37.00  21.90*  19.60*   MONOCYTES  7.70  8.80  11.70   EOSINOPHILS  2.80  2.70  2.90   BASOPHILS  0.70  0.60  0.60   ASTSGOT   --   85*   --    ALTSGPT   --   108*   --    ALKPHOSPHAT   --   65   --    TBILIRUBIN   --   0.5   --        Meds:  • hydrALAZINE  25 mg     •  losartan  50 mg     • [START ON 8/15/2018] magnesium oxide  400 mg     • enoxaparin (LOVENOX) injection  60 mg     • oxyCODONE immediate-release  5 mg      Or   • oxyCODONE immediate-release  10 mg     • labetalol  10-20 mg     • hydrALAZINE  10-20 mg     • carvedilol  3.125 mg     • Pharmacy  1 Each     • LR   Stopped (08/11/18 0700)   • Respiratory Care per Protocol       • ipratropium-albuterol  3 mL          Procedures:  None today    Imaging:  DX-ABDOMEN FOR TUBE PLACEMENT   Final Result      Feeding tube placement with the tip projecting over the distal stomach or proximal duodenum      DX-CHEST-PORTABLE (1 VIEW)   Final Result      1.  Status post extubation.   2.  Tubes and lines are in stable position.   3.  Left basilar atelectasis.   4.  Elevated right dome of elevated right dome of diaphragm.      DX-ABDOMEN FOR TUBE PLACEMENT   Final Result      The tip of the Cortrak tube projects over the antrum of the stomach      DX-ABDOMEN FOR TUBE PLACEMENT   Final Result      Feeding tube appears to extend through the region of the stomach with tip at proximal end of the duodenum near the pylorus.      DX-CHEST-PORTABLE (1 VIEW)   Final Result      Stable examination.      DX-CHEST-PORTABLE (1 VIEW)   Final Result      Stable bilateral lower lobe atelectasis and/or consolidation.      DX-CHEST-PORTABLE (1 VIEW)   Final Result      Parahilar and bibasilar opacities are mildly improved on the right and increased on the left compared to prior.      Interstitial prominence likely represents interstitial edema.      Trace left pleural effusion is not excluded.         LD-TYARHWR-2 VIEW   Final Result      Enteric tube tip projects over the distal gastric body/antrum.      DX-CHEST-PORTABLE (1 VIEW)   Final Result      No significant change from prior exam.      ECHOCARDIOGRAM COMP W/O CONT   Final Result      DX-CHEST-PORTABLE (1 VIEW)   Final Result      Perihilar and bibasilar opacities likely represent atelectasis.       Endotracheal tube now projects at the level of the clavicular heads.         CT-CTA CHEST PULMONARY ARTERY W/ RECONS   Final Result      1.  No filling defects within the pulmonary arteries to indicate emboli.   2.  Prominent central pulmonary vessels suggesting pulmonary hypertension.   3.  RIGHT mainstem intubation with LEFT lower lobe atelectasis.      These findings were discussed with EUSEBIO SILVERIO on 8/8/2018 12:52 AM.         CT-CSPINE WITHOUT PLUS RECONS   Final Result      1.  Moderate to severe multilevel degenerative change of cervical spine.   2.  No acute fracture or subluxation.      CT-HEAD W/O   Final Result      1.  No acute intracranial abnormality.   2.  LEFT parietal scalp swelling.   3.  Chronic paranasal sinus disease.      DX-CHEST-PORTABLE (1 VIEW)   Final Result      1.  Supportive tubing as described above.   2.  Hypoinflation with elevated RIGHT hemidiaphragm.   3.  LEFT lung base atelectasis.          Problem and Plan:      * Severe sepsis ([facility])   Assessment & Plan    Severe sepsis secondary to pneumococcal pneumonia, which lead to AMS. Patient treated with 7days of rocephin, ID following. Patient extubated yesterday, improving.   Plan  - CorTrak with TF, pending swallow eval        Encephalopathy acute secondary to hypoxia from pneumonia   Assessment & Plan    Pneumococcal Pneumonia, completed 7 day course of Ceftriaxone. ID following. Patients mentation improving, more alert compared to yesterady        Streptococcal pneumonia ([facility])   Assessment & Plan    Patient treated with 7 days of Rocephin, ID on case        Acute hypoxemic respiratory failure ([facility])   Assessment & Plan    Patient was extubated 8/12/18. Tolerating well. Saturating at 96% on 3L, RR 17, not in visible respiratory distress.         Atrial fibrillation with RVR ([facility])   Assessment & Plan    New onset this admission in the context of sepsis, [id number]  2. Patient converted to NSR. Patient was trreated with Amiodarone,  this was D/C  Plan  - On telemetry        Hyperglycemia   Assessment & Plan    Resolved. Patient has not received a dose of Insulin while in hospital.   Plan  - D/C ISS and accuchecks           Atelectasis   Assessment & Plan    Please refer to Acute hypoxemic respiratory failure A&P        Abnormal urinalysis   Assessment & Plan    U/A on 8/7/2018 : nitrite positive, small amount leucocyte esterase, WBC 5-10, no bacteria. Patient received 7 days of rocephin.         Elevated troponin   Assessment & Plan    On admission trop 0.12.  EKG no st/t changes. No further trending, likely secondary to demand due to sepsis.         Rhabdomyolysis   Assessment & Plan    Trending down, last level 427.         Hypocalcemia   Assessment & Plan    Resolved. Adjusted calcium within normal range.         Hypomagnesemia   Assessment & Plan    Resolved. Maintain Mg at ~2.         Hypokalemia   Assessment & Plan    Resolved. Will maintin K ~4        Pulmonary hypertension ([facility])   Assessment & Plan    CTPA showed prominent pulmonary vessels suggesting pulmonary hypertension. TTE on 8/8/2018 : normal LV systolic function, EF 60%         Essential hypertension- (present on admission)   Assessment & Plan     this AM. Patients antihypertensive home medications, Hydralazone and Losartan held on admission because of sepsis and HAROON.   Plan  - Resumed patients home losartan and hydralazine, at reduced doses.            DISPO: Will continue to monitor mentation, consider transfer to floor later today or tomorrow.     CODE STATUS: Full Code    Quality Measures:  Raygoza Catheter: In place  DVT Prophylaxis: Enoxaparin  Ulcer Prophylaxis: None  Antibiotics: None, completed course of Rocehpin  Lines: PIV and CVC

## 2018-08-14 NOTE — PROGRESS NOTES
ICU TRANSFER NOTE    Date of Admission: 8/7/2018    Date of Transfer:  8/14/2018   3:14 PM      Admitting Diagnosis: Acute encephalopathy secondary to acute hypoxemic respiratory failure and sepsis    Procedures:   Intubation on 08/08/2018  Lumbar puncture on 08/08/2018  Bronchoscopy on 08/08/2018  Central venous catheter placement on 08/08/2018    Consultations:     Infectious disease and pulmonary    HPI:     Per Dr. Johnson's note dated 08/08/2018.    Mrs. Quinones is a 57 yo female with a history of hypertension and lower extremity ulcers and cellulitis that presented for the above.  Per EMS, she was last seen acting normally 2 nights prior.  Her  checked on her the day of presentation and she was found down on the ground.  He tried to arouse her, but she was unresponsive.  When the EMS arrived on scene, T 104 deg F, /100, BGl 110.  The patient had trismus and a respiration rate of 40 bbm.  She was subsequently intubated and given 2 L of IVF, ketamine, rocuronium, fentanyl, versed.     On admission, T 38.8 C, , RR 24, /117, 134 kg, sat 95% on ventilator.  WBC 16.3 (73.6% neuts), Hgb 17.2.  K 3.1, bicarb 19, AG 8.0, BUN 28, Cr 1.29, Ca 5.5 (correcte to 6.46), CPK 2390, Trop 0.12.  ABG, ph 7.25, PCO2 50.4, Po2, 82, BC 22.1.  U/A nitrite positive, LE small, WBC 5-10.  LP with 36 WBC and 88 protein.  CXR showed lenora lung atelectasis.  CT head showed no intracranial abnormality.  CT spine showed no fratures or subluxations.  CT chest showed no indication of pulmonary embolism, prominent pulmonary vessels suggesting pulmonary hypertension.  EKG showed , , sinus tachycardia, left ventricular hypertrophy.  ED gave ceftriaxone, metronidazole, normal saline bolus, fentanyl, zofran, calcium gluconate.      Hospital Course: Patient was admitted to the ICU where a lumbar puncture, central venous catheter, and bronchoscopy were done.  Bronchoscopy revealed moderate amount of yellow secretions  bilaterally and a BAL was carried out on the basilar segments of the left lower lung.  Culture from this revealed gram-positive cocci.  However, due to slightly elevated protein levels patient was started on ampicillin, Rocephin, vancomycin, and acyclovir.  Infectious disease was consulted and they recommended continuing Rocephin at CAP dosing, and continuing acyclovir until HSV PCR testing could be done.  There was concern for pneumococcal pneumonia, left lower lobe consolidation CT scan, and apparent meningitis. They recommended discontinuing vancomycin and ampicillin.  They also mention concern for West Nile virus.  Eventually HSV PCR was negative and patient was discontinued from acyclovir, but patient was continued on Rocephin for a total of 7 days. Patient was extubated on 08/12/2018. Patients altered mentation continued to improve, her BP increased and patient was restarted on home meds for HTN. PT/OT were ordered, along with a swallow evaluation. Patient was deemed medically stable enough to be transferred to the floor.       Condition: Medically stable       Follow up: Resolution of AMS and improvement of swallowing, ambulation, and able to complete ALS.

## 2018-08-14 NOTE — PROGRESS NOTES
Pulmonary Critical Care Progress Note        Admit:  8/7/2018    Chief Complaint: Altered LOC/presumed SOB, intubated prior to arrival    History of Present Illness:   The entire history is obtained from healthcare providers and the medical record as this lady cannot give me any history.  This lady has a history of hypertension and sleep apnea.  Apparently she was sick on Sunday.  Monday she was hot and diaphoretic and did not feel good.  She declined to go to the hospital at that time.  Monday night she slept on the couch and apparently fell onto the floor during the night.  This morning she was on the floor, but her family felt that she was sleeping.  This evening, EMS was activated when the family realized that she had not moved from the floor all day.  There was evidence of emesis at the scene.  EMS found the patient have a temperature of 104°F and she was breathing 40 times per minute.  She was intubated and transported to [facility].    Review of Systems   Constitutional: Positive for malaise/fatigue. Negative for chills, diaphoresis and fever.   HENT: Negative for congestion, nosebleeds and sore throat.    Respiratory: Positive for cough and shortness of breath (Improved). Negative for sputum production.    Cardiovascular: Negative for chest pain and palpitations.   Gastrointestinal: Negative for abdominal pain, diarrhea, nausea and vomiting.   Genitourinary: Negative for flank pain and hematuria.   Musculoskeletal: Negative for back pain.   Skin: Negative for rash.   Neurological: Positive for weakness. Negative for sensory change, speech change, focal weakness and headaches.   Psychiatric/Behavioral: Negative for depression. The patient is not nervous/anxious.        Interval Events:  24 hour interval history reviewed     A&O x4  No pain  Ill slow of thought process and a bit flat in terms of affect but improved versus yesterday  Pulled cortrak -> replaced  SR 70s  SBp 150s+  Overall blood pressure improved  today versus yesterday  Afeb  Tm 99.0  CVC  Mobilized  UO adequate   BMS pulled out  2->3 lpm NC  PEP, IS 4152-2250  CXR better LLL ATX    Serial follow-up, patient seen up in chair, respiratory status appears a bit better and mentation is a bit quicker       YESTERDAY  Extubated yesterday  A&O x3  No pain  Slow  SR 70s  SBp 150s+  Afeb   CVC  Mobilized  UO adequate   BMS pulled out  2 lpm NC  PEP, IS 1100  CXR better LLL ATX  Anti-10A level low    Serial follow-up  Oxygenation acceptable throughout the day and low flow nasal cannula  Patient still fairly weak and needs assistance with any mobility  Hypertension noted throughout the day    PFSH:  No change.    General: Obese, appears her stated age, appears acutely and chronically ill    Skin: Warm and dry, rash or lesion, normal capillary refill    Respiratory:     Pulse Oximetry: 95 %  Nasal cannula O2          Exam: unlabored respirations, no intercostal retractions or accessory muscle use    Diminished breath sounds at bases bilaterally   Bilateral coarse rales at the bases have improved   No wheezing    ImagingCXR  I have personally reviewed the chest x-ray my impression is  noted above and films are reviewed with Team on rounds     Recent Labs      08/12/18   0448   ISTATAPH  7.476   ISTATAPCO2  37.0   ISTATAPO2  66   ISTATATCO2  28   [id number]  94   ISTATARTHCO3  27.3*   ISTATARTBE  4*   ISTATTEMP  38.9 C   ISTATFIO2  30   ISTATSPEC  Arterial   ISTATAPHTC  7.447   PTIUYUYD2SZ  75       HemoDynamics:  Pulse: 73, Heart Rate (Monitored): 73  NIBP: 159/84  CVP (mm Hg): (!) 11 MM HG    Exam: regular rate and rhythm, no ectopy, no gallop, no murmur, intact pulses, trace edema  Imaging: Available data reviewed   ECHO 8/8  CONCLUSIONS  No prior study is available for comparison.   Normal left ventricular systolic function.  Left ventricular ejection fraction is visually estimated to be 60%.  Grade I diastolic dysfunction.  The right ventricle was normal in size and  function.  No significant valve disease or flow abnormalities.     Recent Labs      08/12/18   0524   CPKTOTAL  427*       Neuro:  GCS Total Camargo Coma Score: 15       Exam: no focal deficits noted mental status intact, slow but appropriate, unchanged  Imaging: Available data reviewed    Fluids:  Intake/Output       08/12/18 0700 - 08/13/18 0659 08/13/18 0700 - 08/14/18 0659 08/14/18 0700 - 08/15/18 0659      0700-1859 1900-0659 Total 0700-1859 1900-0659 Total 0700-1859 1900-0659 Total       Intake    P.O.  --  0 0  --  -- --  --  -- --    P.O. -- 0 0 -- -- -- -- -- --    I.V.  393.7  120 513.7  50  120 170  --  -- --    Propofol Volume 293.7 0 293.7 -- -- -- -- -- --    IV Piggyback Volume (IV Piggyback) -- -- -- 50 -- 50 -- -- --    IV Piggyback Volume (IV Piggyback) 100 -- 100 -- -- -- -- -- --    IV Volume (LR) -- 120 120 -- 120 120 -- -- --    Other  60  -- 60  90  -- 90  --  -- --    Medications (P.O./ Enteral Liquids) 60 -- 60 90 -- 90 -- -- --    Enteral  420  620 1040  1230  880 2110  --  -- --    Free Water / Tube Flush 120 -- 120 270 -- 270 -- -- --    Intake (mL) ([REMOVED] Enteral Tube Left Nare Cortrak Gastric Feeding Tube) -- 420 420  -- -- --    Intake (mL) ([REMOVED] Enteral Tube Left Nare Cortrak Gastric Feeding Tube) 100 -- 100 -- -- -- -- -- --    Intake (mL) ([REMOVED] Enteral Tube Right Nare Cortrak Gastric Feeding Tube) 200 200 400 -- -- -- -- -- --    Total Intake 873.7 740 1613.7 1370 1000 2370 -- -- --       Output    Urine  1025  2800 3825  1155  1070 2225  --  -- --    Output (mL) (Urinary Catheter Indwelling Catheter) 1025 2800 3825 1155 1070 2225 -- -- --    Drains  --  0 0  --  0 0  --  -- --    Residual Amount (ml) (Discarded) -- 0 0 -- 0 0 -- -- --    Stool  60  -- 60  --  -- --  --  -- --    Number of Times Stooled -- -- -- 1 x -- 1 x -- -- --    Measurable Stool (mL) 60 -- 60 -- -- -- -- -- --    Total Output 1085 2800 3885 1155 1070 2225 -- -- --       Net I/O      -211.3 -2060 -2271.3 215 -70 145 -- -- --          Recent Labs      18   0535   SODIUM  141  143  141   POTASSIUM  3.5*  3.7  4.0   CHLORIDE  106  107  105   CO2  27  30  29   BUN  20  16  23*   CREATININE  0.55  0.49*  0.49*   MAGNESIUM  1.8  1.8  1.9   PHOSPHORUS  3.9  3.6  3.0   CALCIUM  8.1*  8.4*  8.5       GI/Nutrition:  Exam: Obese, abdomen is soft and non-tender, normal active bowel sounds, no CVAT, no change  Imaging: Available data reviewed  taking PO  Liver Function  Recent Labs      18   0535   ALTSGPT   --   108*   --    ASTSGOT   --   85*   --    ALKPHOSPHAT   --   65   --    TBILIRUBIN   --   0.5   --    PREALBUMIN   --   21.0   --    GLUCOSE  92  96  98       Heme:  Recent Labs      18   0535   RBC  4.21  4.73  4.84   HEMOGLOBIN  12.5  13.9  14.2   HEMATOCRIT  38.9  44.0  44.8   PLATELETCT  193  222  273       Infectious Disease:  Monitored Temp 2  Av.9 °C (98.4 °F)  Min: 36.4 °C (97.5 °F)  Max: 37.2 °C (99 °F)  Temp  Av °C (98.6 °F)  Min: 36.8 °C (98.2 °F)  Max: 37.2 °C (99 °F)  Micro: antibiotics reviewed and cultures reviewed  Recent Labs      18   0535   WBC  6.8  8.4  9.6   NEUTSPOLYS  49.90  64.20  62.10   LYMPHOCYTES  37.00  21.90*  19.60*   MONOCYTES  7.70  8.80  11.70   EOSINOPHILS  2.80  2.70  2.90   BASOPHILS  0.70  0.60  0.60   ASTSGOT   --   85*   --    ALTSGPT   --   108*   --    ALKPHOSPHAT   --   65   --    TBILIRUBIN   --   0.5   --      Current Facility-Administered Medications   Medication Dose Frequency Provider Last Rate Last Dose   • hydrALAZINE (APRESOLINE) tablet 25 mg  25 mg Q8HRS Dusty Muñiz M.D.       • losartan (COZAAR) tablet 50 mg  50 mg BID Dusty Muñiz M.D.       • enoxaparin (LOVENOX) inj 60 mg  60 mg Q12HRS Ramiro Paz M.D.   60 mg at 18 0532   • oxyCODONE immediate-release  (ROXICODONE) tablet 5 mg  5 mg Q6HRS PRN Dusty Muñiz M.D.        Or   • oxyCODONE immediate release (ROXICODONE) tablet 10 mg  10 mg Q6HRS PRN Dusty Muñiz M.D.       • potassium bicarbonate (KLYTE) 25 MEQ effervescent tablet TBEF 25 mEq  25 mEq DAILY Narendra Granger M.D.   25 mEq at 08/14/18 0640   • labetalol (NORMODYNE,TRANDATE) injection 10-20 mg  10-20 mg Q HOUR PRN Henrry Ying M.D.   10 mg at 08/12/18 1928   • hydrALAZINE (APRESOLINE) injection 10-20 mg  10-20 mg Q4HRS PRN Henrry Ying M.D.   20 mg at 08/13/18 1804   • carvedilol (COREG) tablet 3.125 mg  3.125 mg BID WITH MEALS Taz Castro M.D.   3.125 mg at 08/13/18 1628   • Pharmacy Consult: Enteral tube feeding - review meds/change route/product selection  1 Each PRN Yris Mabry M.D.       • lactated ringers infusion   Continuous Remi Martinez D.O.   Stopped at 08/11/18 0700   • Respiratory Care per Protocol   Continuous RT Henrry Ying M.D.       • MD ALERT...Adult ICU Electrolyte Replacement per Pharmacy Protocol   pharmacy to dose Henrry Ying M.D.       • ipratropium-albuterol (DUONEB) nebulizer solution  3 mL Q2HRS PRN (RT) Henrry Ying M.D.         Last reviewed on 8/8/2018  5:12 PM by [facility]    Quality  Measures:   Labs reviewed, Medications reviewed and Radiology images reviewed                      Assessment / Plan  Acute hypoxemic respiratory failure, severe -oxygenation improving   Likely secondary to strep pneumonia.    Intubated 8/7   Extubated 8/12   RT protocols   IS/mobilize   D/c Fent - Oxy PO PRN   ATX    Monitor for the need for forced diuresis with Lasix, current fluid balance negative an EF 50%    Encephalopathy/Altered mental status.     Secondary to sepsis.     Improved   Limit sedating medications      Pneumonia.  As above.     On rocephin for total 7 days.     Continue chest physiotherapy/suctioning     Atrial fibrillation.  New onset.      Rate controlled on amiodarone and BB.     BB held several doses, off amiodarton 8/11,      On lovenox bid for prophylaxis, [email or web address]     Hypertension: echo diastolic dysfunction. For now controlled.   Resume home Bp meds - half dose   Reassess daily for need to resume home doses or further adjust medications     Rhabdomyolysis: downtrending, no further cpk   Monitor    Skin wounds, right shoulder and posterior legs, wound care to follow during hospitalization     Hypokalemia, continue to supplement - ERP   Monitor    Morbid obesity/hypertension   Review of systems positive for JENNIFER, will need PSG as an outpatient when clinically able   Behavioral modification and weight loss encouraged again!     Prophylaxis    Up lovenox to 60 bid for prophylaxis    Repeat Anti-10a levels 48 hrs, adjust dose at that time   IS   Mobilize     Okay to transfer to telemetry  [facility] Pulmonary service to follow after transfer out of the ICU    The ROS, Physical Exam and Plan were updated today (08/14/18).  Compared with yesterday's note, there are no new changes except as documented above.      Discussed patient condition and risk of morbidity and/or mortality with RN, RT, Pharmacy, , [employer] resident, Charge nurse / hot rounds and Patient.

## 2018-08-14 NOTE — THERAPY
"Speech Language Therapy Clinical Swallow Evaluation completed.    Functional Status: Patient sitting out of bed in a chair, sleeping, and awoke easily with verbal cue x3. She was AAOx4 and denied history of dysphagia. Oral motor examination was conducted and revealed no gross asymmetry or weakness. Vocal quality strong and clear. PO trials were given and consisted of ice chips, 4 oz NTL, 4oz puree, 4 oz soft solids in mixed consistency form, 16 oz thin liquids (single and serial sips), and dry crackers. Swallow trigger was timely and laryngeal elevation complete to palpation. No overt s/sx of aspiration appreciated across all consistencies and textures. Vocal quality and breath sounds remained clear. She was educated in regards to current status and SLP recs.     Recommendations - Diet: At this time, patient appears at the level to initiate a regular/thin liquid diet. Please do not pull cortrak for at least 1-2 observed meals to ensure diet tolerance. HOLD PO with any difficulty and page speech therapy.                             Strategies: Monitor during meals and Head of Bed at 90 Degrees                            Medication Administration: Whole with Liquids     Plan of Care: Will benefit from Speech Therapy 3 times per week    Post-Acute Therapy: Currently anticipate no further skilled therapy needs once patient is discharged from the inpatient setting.    See \"Rehab Therapy-Acute\" Patient Summary Report for complete documentation. Thank you for the consult.         "

## 2018-08-14 NOTE — PROGRESS NOTES
Cortrak Placement    Tube Team verified patient name and medical record number prior to tube placement.  Cortrak tube 109cm ,10 F) placed at 75 cm in left nare.  Per Cortrak picture, tube appears to be in the stomach.  Nursing Instructions: Awaiting KUB to confirm placement before use for medications or feeding. Once placement confirmed, flush tube with 30 ml of water, and then remove and save stylet, in patient medication drawer.

## 2018-08-14 NOTE — CARE PLAN
Problem: Communication  Goal: The ability to communicate needs accurately and effectively will improve  Outcome: PROGRESSING AS EXPECTED  Patient communicates needs effectively.    Problem: Skin Integrity  Goal: Risk for impaired skin integrity will decrease  Outcome: PROGRESSING AS EXPECTED  Turned and repositioned every 2 hours and PRN throughout shift, preventive sacral mepilex dressing ion place, waffle overlay in use.

## 2018-08-14 NOTE — CARE PLAN
Problem: Safety  Goal: Will remain free from falls  Outcome: PROGRESSING AS EXPECTED  Patient free from falls this shift.     Problem: Pain Management  Goal: Pain level will decrease to patient's comfort goal  Outcome: PROGRESSING AS EXPECTED  Reporting 0/10 Pain

## 2018-08-14 NOTE — PROGRESS NOTES
12 Hour chart check  MS: NSR 70's 0.16/0.12/0.46    Skin note: Patient with skin breakdown to right flank, left elbow, left upper arm and right ankle.

## 2018-08-14 NOTE — CARE PLAN
Problem: Nutritional:  Goal: Nutrition support tolerated and meeting greater than 85% of estimated needs  Outcome: MET Date Met: 08/14/18

## 2018-08-15 ENCOUNTER — APPOINTMENT (OUTPATIENT)
Dept: RADIOLOGY | Facility: MEDICAL CENTER | Age: 58
DRG: 853 | End: 2018-08-15
Attending: INTERNAL MEDICINE

## 2018-08-15 LAB
ANION GAP SERPL CALC-SCNC: 7 MMOL/L (ref 0–11.9)
BASOPHILS # BLD AUTO: 1 % (ref 0–1.8)
BASOPHILS # BLD: 0.1 K/UL (ref 0–0.12)
BUN SERPL-MCNC: 18 MG/DL (ref 8–22)
CALCIUM SERPL-MCNC: 8.4 MG/DL (ref 8.5–10.5)
CHLORIDE SERPL-SCNC: 106 MMOL/L (ref 96–112)
CO2 SERPL-SCNC: 26 MMOL/L (ref 20–33)
CREAT SERPL-MCNC: 0.57 MG/DL (ref 0.5–1.4)
EOSINOPHIL # BLD AUTO: 0.28 K/UL (ref 0–0.51)
EOSINOPHIL NFR BLD: 2.7 % (ref 0–6.9)
ERYTHROCYTE [DISTWIDTH] IN BLOOD BY AUTOMATED COUNT: 52.5 FL (ref 35.9–50)
GLUCOSE SERPL-MCNC: 90 MG/DL (ref 65–99)
HCT VFR BLD AUTO: 46.1 % (ref 37–47)
HGB BLD-MCNC: 14.5 G/DL (ref 12–16)
IMM GRANULOCYTES # BLD AUTO: 0.36 K/UL (ref 0–0.11)
IMM GRANULOCYTES NFR BLD AUTO: 3.5 % (ref 0–0.9)
LYMPHOCYTES # BLD AUTO: 3.27 K/UL (ref 1–4.8)
LYMPHOCYTES NFR BLD: 32 % (ref 22–41)
MAGNESIUM SERPL-MCNC: 2.1 MG/DL (ref 1.5–2.5)
MCH RBC QN AUTO: 29.2 PG (ref 27–33)
MCHC RBC AUTO-ENTMCNC: 31.5 G/DL (ref 33.6–35)
MCV RBC AUTO: 92.8 FL (ref 81.4–97.8)
MONOCYTES # BLD AUTO: 1.24 K/UL (ref 0–0.85)
MONOCYTES NFR BLD AUTO: 12.1 % (ref 0–13.4)
NEUTROPHILS # BLD AUTO: 4.97 K/UL (ref 2–7.15)
NEUTROPHILS NFR BLD: 48.7 % (ref 44–72)
NRBC # BLD AUTO: 0 K/UL
NRBC BLD-RTO: 0 /100 WBC
PHOSPHATE SERPL-MCNC: 3.9 MG/DL (ref 2.5–4.5)
PLATELET # BLD AUTO: 231 K/UL (ref 164–446)
PMV BLD AUTO: 9.5 FL (ref 9–12.9)
POTASSIUM SERPL-SCNC: 4.8 MMOL/L (ref 3.6–5.5)
RBC # BLD AUTO: 4.97 M/UL (ref 4.2–5.4)
SODIUM SERPL-SCNC: 139 MMOL/L (ref 135–145)
UFH PPP CHRO-ACNC: 0.4 U/ML
WBC # BLD AUTO: 10.2 K/UL (ref 4.8–10.8)

## 2018-08-15 PROCEDURE — 84100 ASSAY OF PHOSPHORUS: CPT

## 2018-08-15 PROCEDURE — 80048 BASIC METABOLIC PNL TOTAL CA: CPT

## 2018-08-15 PROCEDURE — 92526 ORAL FUNCTION THERAPY: CPT

## 2018-08-15 PROCEDURE — 700102 HCHG RX REV CODE 250 W/ 637 OVERRIDE(OP): Performed by: INTERNAL MEDICINE

## 2018-08-15 PROCEDURE — 94668 MNPJ CHEST WALL SBSQ: CPT

## 2018-08-15 PROCEDURE — 700111 HCHG RX REV CODE 636 W/ 250 OVERRIDE (IP): Performed by: STUDENT IN AN ORGANIZED HEALTH CARE EDUCATION/TRAINING PROGRAM

## 2018-08-15 PROCEDURE — 99407 BEHAV CHNG SMOKING > 10 MIN: CPT | Performed by: INTERNAL MEDICINE

## 2018-08-15 PROCEDURE — 99233 SBSQ HOSP IP/OBS HIGH 50: CPT | Performed by: INTERNAL MEDICINE

## 2018-08-15 PROCEDURE — 83735 ASSAY OF MAGNESIUM: CPT

## 2018-08-15 PROCEDURE — 700111 HCHG RX REV CODE 636 W/ 250 OVERRIDE (IP): Performed by: INTERNAL MEDICINE

## 2018-08-15 PROCEDURE — A9270 NON-COVERED ITEM OR SERVICE: HCPCS | Performed by: INTERNAL MEDICINE

## 2018-08-15 PROCEDURE — 770020 HCHG ROOM/CARE - TELE (206)

## 2018-08-15 PROCEDURE — 85520 HEPARIN ASSAY: CPT

## 2018-08-15 PROCEDURE — G8978 MOBILITY CURRENT STATUS: HCPCS | Mod: CL

## 2018-08-15 PROCEDURE — G8979 MOBILITY GOAL STATUS: HCPCS | Mod: CI

## 2018-08-15 PROCEDURE — 97162 PT EVAL MOD COMPLEX 30 MIN: CPT

## 2018-08-15 PROCEDURE — 85025 COMPLETE CBC W/AUTO DIFF WBC: CPT

## 2018-08-15 RX ORDER — FUROSEMIDE 10 MG/ML
10 INJECTION INTRAMUSCULAR; INTRAVENOUS ONCE
Status: COMPLETED | OUTPATIENT
Start: 2018-08-15 | End: 2018-08-15

## 2018-08-15 RX ADMIN — FUROSEMIDE 10 MG: 10 INJECTION, SOLUTION INTRAMUSCULAR; INTRAVENOUS at 08:45

## 2018-08-15 RX ADMIN — LOSARTAN POTASSIUM 50 MG: 25 TABLET, FILM COATED ORAL at 05:34

## 2018-08-15 RX ADMIN — CARVEDILOL 3.12 MG: 3.12 TABLET, FILM COATED ORAL at 08:45

## 2018-08-15 RX ADMIN — ENOXAPARIN SODIUM 60 MG: 100 INJECTION SUBCUTANEOUS at 17:14

## 2018-08-15 RX ADMIN — MAGNESIUM GLUCONATE 500 MG ORAL TABLET 400 MG: 500 TABLET ORAL at 05:34

## 2018-08-15 RX ADMIN — ENOXAPARIN SODIUM 60 MG: 100 INJECTION SUBCUTANEOUS at 05:34

## 2018-08-15 RX ADMIN — HYDRALAZINE HYDROCHLORIDE 25 MG: 25 TABLET, FILM COATED ORAL at 21:02

## 2018-08-15 RX ADMIN — CARVEDILOL 3.12 MG: 3.12 TABLET, FILM COATED ORAL at 17:13

## 2018-08-15 RX ADMIN — LOSARTAN POTASSIUM 50 MG: 25 TABLET, FILM COATED ORAL at 17:13

## 2018-08-15 RX ADMIN — HYDRALAZINE HYDROCHLORIDE 25 MG: 25 TABLET, FILM COATED ORAL at 05:34

## 2018-08-15 RX ADMIN — HYDRALAZINE HYDROCHLORIDE 25 MG: 25 TABLET, FILM COATED ORAL at 14:05

## 2018-08-15 ASSESSMENT — PAIN SCALES - GENERAL
PAINLEVEL_OUTOF10: 0

## 2018-08-15 ASSESSMENT — ENCOUNTER SYMPTOMS
GASTROINTESTINAL NEGATIVE: 1
MUSCULOSKELETAL NEGATIVE: 1
SPEECH CHANGE: 0
RESPIRATORY NEGATIVE: 1
SHORTNESS OF BREATH: 1
DIARRHEA: 0
EYES NEGATIVE: 1
SENSORY CHANGE: 0
PSYCHIATRIC NEGATIVE: 1
DEPRESSION: 0
SPUTUM PRODUCTION: 0
COUGH: 1
CHILLS: 0
FOCAL WEAKNESS: 0
FEVER: 0
NERVOUS/ANXIOUS: 0
NAUSEA: 0
HEADACHES: 0
CARDIOVASCULAR NEGATIVE: 1
PALPITATIONS: 0
ABDOMINAL PAIN: 0
INSOMNIA: 0
WEAKNESS: 1
CONSTITUTIONAL NEGATIVE: 1
FLANK PAIN: 0
NEUROLOGICAL NEGATIVE: 1
VOMITING: 0
BACK PAIN: 0
SORE THROAT: 0
DIAPHORESIS: 0

## 2018-08-15 ASSESSMENT — GAIT ASSESSMENTS
GAIT LEVEL OF ASSIST: STAND BY ASSIST
ASSISTIVE DEVICE: FRONT WHEEL WALKER
DEVIATION: SHUFFLED GAIT
DISTANCE (FEET): 150

## 2018-08-15 ASSESSMENT — COGNITIVE AND FUNCTIONAL STATUS - GENERAL
CLIMB 3 TO 5 STEPS WITH RAILING: A LITTLE
MOVING TO AND FROM BED TO CHAIR: A LITTLE
SUGGESTED CMS G CODE MODIFIER MOBILITY: CK
MOVING FROM LYING ON BACK TO SITTING ON SIDE OF FLAT BED: A LITTLE
STANDING UP FROM CHAIR USING ARMS: A LITTLE
MOBILITY SCORE: 16
WALKING IN HOSPITAL ROOM: A LITTLE
TURNING FROM BACK TO SIDE WHILE IN FLAT BAD: UNABLE

## 2018-08-15 ASSESSMENT — LIFESTYLE VARIABLES: EVER_SMOKED: YES

## 2018-08-15 NOTE — CARE PLAN
Problem: Safety  Goal: Will remain free from injury  Outcome: PROGRESSING AS EXPECTED  Pt mobility assessed at beginning of shift. Pt is a 1 assist. Fall precautions in place. Non-slip socks on. Bed in lowest locked position. Bed alarm on. Call light within reach. Pt educated to call for assistance and verbalizes understanding.    Problem: Knowledge Deficit  Goal: Knowledge of disease process/condition, treatment plan, diagnostic tests, and medications will improve  Outcome: PROGRESSING AS EXPECTED  Pt educated about disease process. Reason why medications are taken. And informed about treatment plan.

## 2018-08-15 NOTE — THERAPY
"Occupational Therapy Evaluation completed.   Functional Status:  Pt pleasant, had delayed responses & appeared mildly confused.  Pt was CGA for supine to sit EOB.  Pt stood with CGA & amb to bathroom with FWW & CGA.  Pt did have multiple LOB post while amb to bathroom.  Pt was CGA for toilet transfers.  Pt groomed standing at the sink with CGA.  Pt returned to supine in bed with SBA.  Plan of Care: Will benefit from Occupational Therapy 3 times per week  Discharge Recommendations:  Equipment: Will Continue to Assess for Equipment Needs. Post-acute therapy Discharge to a transitional care facility for continued skilled therapy services.    Pt currently would benefit from Post Acute OT services.  Depending on how pt progresses she may be able to D/C home once medically cleared?    See \"Rehab Therapy-Acute\" Patient Summary Report for complete documentation.    "

## 2018-08-15 NOTE — PROGRESS NOTES
Report called to elijah Taylor to be transferred to [facility] with transport and telemetry.

## 2018-08-15 NOTE — DIETARY
Nutrition Services: Transition to PO diet    Admit day 7.  Cortrak removed by pt. Will D/c TF orders.   Pt cleared for regular diet per SLP. Adequate PO intake demonstrated.   Nutrition Representative will see pt daily for menu options.    RD available as further indicated.

## 2018-08-15 NOTE — THERAPY
"Physical Therapy Evaluation completed.   Bed Mobility:  Supine to Sit: Contact Guard Assist  Transfers: Sit to Stand: Stand by Assist  Gait: Level Of Assist: Stand by Assist with Front-Wheel Walker       Plan of Care: Will benefit from Physical Therapy 2 times per week  Discharge Recommendations: Equipment: Will Continue to Assess for Equipment Needs.  Should progress towards a home d/c  See \"Rehab Therapy-Acute\" Patient Summary Report for complete documentation.     "

## 2018-08-15 NOTE — PROGRESS NOTES
2 RN skin check complete with EJ RN.   Bilateral ears are red and blanching.  Tear to back (left side)  2 skin tears to left upper extremity.   Perineal area is red.  Skin tears to bilateral LE. Dry scab behind right heel. Bilateral feet are dry and flaky.

## 2018-08-15 NOTE — PROGRESS NOTES
Pulmonary Critical Care Progress Note        Admit:  8/7/2018    Chief Complaint: Altered LOC/presumed SOB, intubated prior to arrival    History of Present Illness:   The entire history is obtained from healthcare providers and the medical record as this lady cannot give me any history.  This lady has a history of hypertension and sleep apnea.  Apparently she was sick on Sunday.  Monday she was hot and diaphoretic and did not feel good.  She declined to go to the hospital at that time.  Monday night she slept on the couch and apparently fell onto the floor during the night.  This morning she was on the floor, but her family felt that she was sleeping.  This evening, EMS was activated when the family realized that she had not moved from the floor all day.  There was evidence of emesis at the scene.  EMS found the patient have a temperature of 104°F and she was breathing 40 times per minute.  She was intubated and transported to [facility].    Review of Systems   Constitutional: Positive for malaise/fatigue (Resolving slowly). Negative for chills, diaphoresis and fever.   HENT: Negative for congestion, nosebleeds and sore throat.    Respiratory: Positive for cough (Improved) and shortness of breath (Improved, primarily with exertion no). Negative for sputum production.    Cardiovascular: Negative for chest pain and palpitations.   Gastrointestinal: Negative for abdominal pain, diarrhea, nausea and vomiting.   Genitourinary: Negative for flank pain and hematuria.   Musculoskeletal: Negative for back pain.   Skin: Negative for rash.   Neurological: Positive for weakness (Improved). Negative for sensory change, speech change, focal weakness and headaches.   Psychiatric/Behavioral: Negative for depression. The patient is not nervous/anxious and does not have insomnia.        Interval Events:  24 hour interval history reviewed     A&O x4  SR 70s  SBp 120s  Tm 37.2  Passed swallow eval, happy to be eating in  [employer]ort[employer]  pulled  Mobilizing in hallways with assistance  No CXR  2 lpm NC  87% sat RA  IS 1750  Lovenox - following anti-10a levels  S/p C3     Reviewed respiratory status with patient at length  Patient has smoked for 41 years!  Smoking cessation was reviewed with the patient for more than 10 minutes, unfortunately patient stated she likes smoking and wanted to go back to it as soon as she got out of the hospital.  At this 0.25 more minutes talking about smoking cessation!  We reviewed consequences of smoking and treatment options to help her quit.    We also reviewed sleep apnea and the need for her to follow-up at the same time we could assess her for COPD and perform outpatient complete PFTs and follow-up chest x-ray to make sure her lungs completely returned to normal.       YESTERDAY  A&O x4  No pain  Ill slow of thought process and a bit flat in terms of affect but improved versus yesterday  Pulled cortrak -> replaced  SR 70s  SBp 150s+  Overall blood pressure improved today versus yesterday  Afeb  Tm 99.0  CVC  Mobilized  UO adequate   BMS pulled out  2->3 lpm NC  PEP, IS [id number]  CXR better LLL ATX    Serial follow-up, patient seen up in chair, respiratory status appears a bit better and mentation is a bit quicker    PFSH:  No change.    General: Obese, appears her stated age, appears less acutely ill, more chronically ill at that this time    Skin: Warm and dry, rash or lesion, normal capillary refill    Respiratory:     Pulse Oximetry: 95 %  Nasal cannula O2          Exam: unlabored respirations, no intercostal retractions or accessory muscle use    Diminished breath sounds at bases bilaterally   Bilateral coarse rales at the bases have improved   No wheezing    ImagingCXR  I have personally reviewed the chest x-ray my impression is  noted above and films are reviewed with Team on rounds           Invalid input(s): [id number]    HemoDynamics:  Pulse: 84, Heart Rate (Monitored): 81  Blood Pressure: 156/90,  NIBP: 129/71      Exam: regular rate and rhythm, no ectopy, no gallop, no murmur, intact pulses, trace edema, normal capillary refill    Imaging: Available data reviewed   ECHO 8/8  CONCLUSIONS  No prior study is available for comparison.   Normal left ventricular systolic function.  Left ventricular ejection fraction is visually estimated to be 60%.  Grade I diastolic dysfunction.  The right ventricle was normal in size and function.  No significant valve disease or flow abnormalities.           Neuro:  GCS Total Rama Coma Score: 15       Exam: no focal deficits noted mental status intact, mood/memory improved, thought process is no longer slowed    Imaging: Available data reviewed    Fluids:  Intake/Output       08/13/18 0700 - 08/14/18 0659 08/14/18 0700 - 08/15/18 0659 08/15/18 0700 - 08/16/18 0659      2976-5902 5402-6637 Total 1681-9994 6409-5590 Total 7308-8386 6029-3697 Total       Intake    P.O.  --  -- --  471  -- 471  --  -- --    P.O. -- -- -- 471 -- 471 -- -- --    I.V.  50  120 170  200  -- 200  --  -- --    Magnesium Sulfate Volume -- -- -- 100 -- 100 -- -- --    IV Piggyback Volume (IV Piggyback) 50 -- 50 -- -- -- -- -- --    IV Volume (LR) -- 120 120 100 -- 100 -- -- --    Other  90  -- 90  --  -- --  --  -- --    Medications (P.O./ Enteral Liquids) 90 -- 90 -- -- -- -- -- --    Enteral  1230  880 2110  480  -- 480  --  -- --    Free Water / Tube Flush 270 -- 270 -- -- -- -- -- --    Intake (mL) ([REMOVED] Enteral Tube Left Nare Cortrak Gastric Feeding Tube)  -- -- -- -- -- --    Intake (mL) ([REMOVED] Enteral Tube Left Nare Cortrak Gastric Feeding Tube) -- -- -- 480 -- 480 -- -- --    Total Intake 1370 1000 2370 1151 -- 1151 -- -- --       Output    Urine  1155  1070 2225  750  900 1650  --  -- --    Number of Times Voided -- -- -- 3 x -- 3 x -- -- --    Number of Times Incontinent of Urine -- -- -- -- 1 x 1 x -- -- --    Urine Void (mL) (non-catheter) -- -- -- -- 900 900 -- -- --     Output (mL) ([REMOVED] Urinary Catheter Indwelling Catheter) 1155 1070 2225 750 -- 750 -- -- --    Drains  --  0 0  0  -- 0  --  -- --    Residual Amount (ml) (Discarded) -- 0 0 0 -- 0 -- -- --    Stool  --  -- --  --  -- --  --  -- --    Number of Times Stooled 1 x -- 1 x 3 x -- 3 x -- -- --    Total Output 1155 1070 2225  -- -- --       Net I/O     215 -70 145 401 -900 -499 -- -- --          Recent Labs      18   0535  08/15/18   041   SODIUM  143  141  139   POTASSIUM  3.7  4.0  4.8   CHLORIDE  107  105  106   CO2  30  29  26   BUN  16  23*  18   CREATININE  0.49*  0.49*  0.57   MAGNESIUM  1.8  1.9  2.1   PHOSPHORUS  3.6  3.0  3.9   CALCIUM  8.4*  8.5  8.4*       GI/Nutrition:  Exam: Obese, abdomen is soft and non-tender, normal active bowel sounds, no CVAT, no delta    Imaging: Available data reviewed     taking PO and happy to be doing so    Liver Function  Recent Labs      18   0535  08/15/18   041   ALTSGPT  108*   --    --    ASTSGOT  85*   --    --    ALKPHOSPHAT  65   --    --    TBILIRUBIN  0.5   --    --    PREALBUMIN  21.0   --    --    GLUCOSE  96  98  90       Heme:  Recent Labs      18   0535  08/15/18   041   RBC  4.73  4.84  4.97   HEMOGLOBIN  13.9  14.2  14.5   HEMATOCRIT  44.0  44.8  46.1   PLATELETCT  222  273  231       Infectious Disease:  Monitored Temp 2  Av °C (98.6 °F)  Min: 36.9 °C (98.4 °F)  Max: 37.2 °C (99 °F)  Temp  Av.9 °C (98.4 °F)  Min: 36.5 °C (97.7 °F)  Max: 37.2 °C (99 °F)  Micro: antibiotics reviewed and cultures reviewed   BAL positive for Streptococcus pneumoniae    Recent Labs      18   0436  18   0535  08/15/18   0410   WBC  8.4  9.6  10.2   NEUTSPOLYS  64.20  62.10  48.70   LYMPHOCYTES  21.90*  19.60*  32.00   MONOCYTES  8.80  11.70  12.10   EOSINOPHILS  2.70  2.90  2.70   BASOPHILS  0.60  0.60  1.00   ASTSGOT  85*   --    --    ALTSGPT  108*   --    --    ALKPHOSPHAT   65   --    --    TBILIRUBIN  0.5   --    --      Current Facility-Administered Medications   Medication Dose Frequency Provider Last Rate Last Dose   • furosemide (LASIX) injection 10 mg  10 mg Once Dusty Muñiz M.D.       • magnesium oxide (MAG-OX) tablet 400 mg  400 mg DAILY Jeremy M Gonda, M.D.   400 mg at 08/15/18 0534   • carvedilol (COREG) tablet 3.125 mg  3.125 mg BID WITH MEALS Jeremy M Gonda, M.D.   3.125 mg at 08/14/18 1726   • hydrALAZINE (APRESOLINE) tablet 25 mg  25 mg Q8HRS Jeremy M Gonda, M.D.   25 mg at 08/15/18 0534   • losartan (COZAAR) tablet 50 mg  50 mg BID Jeremy M Gonda, M.D.   50 mg at 08/15/18 0534   • oxyCODONE immediate-release (ROXICODONE) tablet 5 mg  5 mg Q6HRS PRN Jeremy M Gonda, M.D.        Or   • oxyCODONE immediate release (ROXICODONE) tablet 10 mg  10 mg Q6HRS PRN Jeremy M Gonda, M.D.       • traZODone (DESYREL) tablet 50 mg  50 mg QHS PRN Liborio Johnson M.D.   50 mg at 08/14/18 2218   • enoxaparin (LOVENOX) inj 60 mg  60 mg Q12HRS Ramiro Paz M.D.   60 mg at 08/15/18 0534   • labetalol (NORMODYNE,TRANDATE) injection 10-20 mg  10-20 mg Q HOUR PRN Henrry Ying M.D.   10 mg at 08/12/18 1928   • hydrALAZINE (APRESOLINE) injection 10-20 mg  10-20 mg Q4HRS PRN Henrry Ying M.D.   10 mg at 08/14/18 0944   • lactated ringers infusion   Continuous Remi Martinez D.O.   Stopped at 08/11/18 0700   • Respiratory Care per Protocol   Continuous RT Henrry Ying M.D.       • ipratropium-albuterol (DUONEB) nebulizer solution  3 mL Q2HRS PRN (RT) Henrry Ying M.D.         Last reviewed on 8/8/2018  5:12 PM by Azul Arcos [facility]    Quality  Measures:  Labs reviewed, Medications reviewed and Radiology images reviewed                      Assessment / Plan  Acute hypoxemic respiratory failure, severe -oxygenation improving   Likely secondary to strep pneumonia -BAL positive for Streptococcus pneumoniae on 8/8   Intubated 8/7   Extubated  8/12   RT protocols   IS/mobilize   D/c Fent - Oxy PO PRN   ATX    Monitor for the need for forced diuresis with Lasix, current fluid balance negative an EF 50%    Encephalopathy/Altered mental status -improved, back to baseline?   Secondary to sepsis.     Limit sedating medications      Pneumonia.  As above.     On rocephin for total 7 days.     Continue chest physiotherapy/suctioning     Atrial fibrillation.  New onset.     Rate controlled on amiodarone and BB.     BB held several doses, off amiodarton 8/11,      On lovenox bid for prophylaxis, chads1   TSH normal     Hypertension: echo diastolic dysfunction. For now controlled.   Resume home Bp meds - half dose   Reassess daily for need to resume home doses or further adjust medications     Rhabdomyolysis: downtrending, no further cpk   Monitor    Skin wounds, right shoulder and posterior legs, wound care to follow during hospitalization     Hypokalemia, improved   ERP   Monitor    Morbid obesity/JENNIFER -noncompliant   Review of systems positive for JENNIFER, will need f/u PSG as an outpatient when clinically able   Patient noncompliant with CPAP but willing to consider treatment at this time   Behavioral modification and weight loss encouraged again!    Tobacco abuse/dependency   41 years of smoking!   Query COPD   Smoking cessation has been reviewed, patient states she wants to go back to smoking after discharge unfortunately!     Prophylaxis    Continue Lovenox to 60 bid for prophylaxis    Repeat Anti-0a levels 48 hrs, adjust dose at that time   IS   Mobilize     Still okay to transfer to telemetry  [facility] Pulmonary service to follow after transfer out of the ICU  Needs outpatient sleep study for re-titration and reinforcement of compliance, complete PFTs and 6 minute walk  Smoking cessation is reviewed greater than 10 minutes today with the patient, see note above    The ROS, Physical Exam and Plan were updated today (08/15/18).  Compared with yesterday's note, there  are no new changes except as documented above.      Discussed patient condition and risk of morbidity and/or mortality with RN, RT, Pharmacy, , [facility] resident, Charge nurse / hot rounds and Patient.

## 2018-08-15 NOTE — PROGRESS NOTES
[facility] ICU Progress Note      Admit Date: 8/7/2018  ICU Day: 9    Resident(s): Dusty Muñiz  Attending: HUMPHREY VELIZ/ Dr. Paz    Date & Time:   8/15/2018   10:58 AM       Patient ID:    Name:             Salome Quinones     YOB: 1960  Age:                 58 y.o.  female   MRN:               [id number]    HPI:  Patient is a 58 year old lady with past medical history significant for hypertension was brought to the ED after being found unresponsive on the floor by her , intubated at the scene for respiratory distress. Up on arrival, febrile with temp of 38.8 F, tachycardic with -120. She had leucocytosis of 16.3 on admission with lactate of 2, and scored 4/4 on SIRS and 2 on qSOFA. CT Head did not reveal any acute intracranial abnormality. CT C-spine showed some degenerative changes. CXR revealed bibasilar atelectasis. CTPA was negative for pulmonary embolus but did show some prominent pulmonary vessels in keeping with pulmonary hypertension. A lumbar puncture was performed. CSF showed clear fluid, WBC 36, , with mildly elevated protein of 88. Gram stain and culture negative so far. Blood cultures negative so far. Bronchoscopy revealed yellow secretions bilaterally, BAL sent.  Urine analysis revealed nitrites, leucocyte esterase, WBC 5-10, nil on gram stain. She was started on ampicillin, ceftriaxone, vancomycin and acyclovir for severe sepsis with possible sources being meningitis vs aspiration pneumonia vs UTI. Admitted to ICU for further management.     HPI obtained from Dr. Reed note.     Consultants:       PMA: Dr. Paz  Infectious disease    Interval Events:    - Patient more alert and oriented this AM  - Patient passed swallow eval  - Pending transfer to floor  - Monitoring Potassium     Review of Systems   Constitutional: Negative.    HENT: Negative.    Eyes: Negative.    Respiratory: Negative.    Cardiovascular: Negative.    Gastrointestinal: Negative.   "  Genitourinary: Negative.    Musculoskeletal: Negative.    Skin: Negative.    Neurological: Negative.    Endo/Heme/Allergies: Negative.    Psychiatric/Behavioral: Negative.        PHYSICAL EXAM  Vitals:    08/15/18 0800 08/15/18 0821 08/15/18 0845 08/15/18 0906   BP: 123/62  123/62    Pulse:   77    Resp:  (!) 23  17   Temp:       SpO2: 92%   93%   Weight:       Height:         Body mass index is 42.09 kg/m².  /62   Pulse 77   Temp 36.5 °C (97.7 °F)   Resp 17   Ht 1.803 m (5' 11\")   Wt (!) 136.9 kg (301 lb 13 oz)   SpO2 93%   BMI 42.09 kg/m²   O2 therapy: Pulse Oximetry: 93 %, O2 (LPM): 2, O2 Delivery: Silicone Nasal Cannula    Physical Exam   Constitutional: She is well-developed, well-nourished, and in no distress. No distress.   HENT:   Head: Normocephalic and atraumatic.   Eyes: Pupils are equal, round, and reactive to light. EOM are normal.   Neck: Normal range of motion.   Cardiovascular: Normal rate and normal heart sounds.    Pulmonary/Chest: Effort normal and breath sounds normal. No respiratory distress.   Abdominal: Soft. Bowel sounds are normal.   Musculoskeletal: She exhibits edema.   Neurological: She is alert. No cranial nerve deficit.   Psychiatric: Her mood appears not anxious. She is not agitated.       Respiratory:     Respiration: 17, Pulse Oximetry: 93 %, O2 Daily Delivery Respiratory : Silicone Nasal Cannula    Chest Tube Drains:          Invalid input(s): [id number]    HemoDynamics:  Pulse: 77, Heart Rate (Monitored): 78 Blood Pressure: 123/62, NIBP: 129/71     Neuro:      Fluids:    Date 08/15/18 0700 - 08/16/18 0659   Shift 5601-9456 6046-2610 0582-4340 24 Hour Total   I  N  T  A  K  E   P.O. 116   116      P.O. 116   116    I.V. 20   20      IV Volume (LR) 20   20    Shift Total 136   136   O  U  T  P  U  T   Urine 400   400      Urine Void (mL) (non-catheter) 400   400    Shift Total 400   400   NET -264   -264        Intake/Output Summary (Last 24 hours) at 08/13/18 " 1111  Last data filed at 18 1000   Gross per 24 hour   Intake          1576.21 ml   Output             3940 ml   Net         -2363.79 ml         Body mass index is 42.09 kg/m².    Recent Labs      08/13/18   0436  08/14/18   0535  08/15/18   0410   SODIUM  143  141  139   POTASSIUM  3.7  4.0  4.8   CHLORIDE  107  105  106   CO2  30  29  26   BUN  16  23*  18   CREATININE  0.49*  0.49*  0.57   MAGNESIUM  1.8  1.9  2.1   PHOSPHORUS  3.6  3.0  3.9   CALCIUM  8.4*  8.5  8.4*       GI/Nutrition:  Recent Labs      1835  08/15/18   0410   ALTSGPT  108*   --    --    ASTSGOT  85*   --    --    ALKPHOSPHAT  65   --    --    TBILIRUBIN  0.5   --    --    PREALBUMIN  21.0   --    --    GLUCOSE  96  98  90       Heme:  Recent Labs      08/13/18   0436  08/14/18   0535  08/15/18   0410   RBC  4.73  4.84  4.97   HEMOGLOBIN  13.9  14.2  14.5   HEMATOCRIT  44.0  44.8  46.1   PLATELETCT  222  273  231       Infectious Disease:  Monitored Temp 2  Av.1 °C (98.7 °F)  Min: 36.9 °C (98.4 °F)  Max: 37.2 °C (99 °F)  Temp  Av.9 °C (98.4 °F)  Min: 36.5 °C (97.7 °F)  Max: 37.2 °C (99 °F)  Recent Labs      08/13/18   0436  08/14/18   0535  08/15/18   0410   WBC  8.4  9.6  10.2   NEUTSPOLYS  64.20  62.10  48.70   LYMPHOCYTES  21.90*  19.60*  32.00   MONOCYTES  8.80  11.70  12.10   EOSINOPHILS  2.70  2.90  2.70   BASOPHILS  0.60  0.60  1.00   ASTSGOT  85*   --    --    ALTSGPT  108*   --    --    ALKPHOSPHAT  65   --    --    TBILIRUBIN  0.5   --    --        Meds:  • magnesium oxide  400 mg     • carvedilol  3.125 mg     • hydrALAZINE  25 mg     • losartan  50 mg     • oxyCODONE immediate-release  5 mg      Or   • oxyCODONE immediate-release  10 mg     • traZODone  50 mg     • enoxaparin (LOVENOX) injection  60 mg     • labetalol  10-20 mg     • hydrALAZINE  10-20 mg     • LR   Stopped (18 0700)   • Respiratory Care per Protocol       • ipratropium-albuterol  3 mL          Procedures:  None  today    Imaging:  DX-ABDOMEN FOR TUBE PLACEMENT   Final Result      Feeding tube placement with the tip projecting over the distal stomach or proximal duodenum      DX-CHEST-PORTABLE (1 VIEW)   Final Result      1.  Status post extubation.   2.  Tubes and lines are in stable position.   3.  Left basilar atelectasis.   4.  Elevated right dome of elevated right dome of diaphragm.      DX-ABDOMEN FOR TUBE PLACEMENT   Final Result      The tip of the Cortrak tube projects over the antrum of the stomach      DX-ABDOMEN FOR TUBE PLACEMENT   Final Result      Feeding tube appears to extend through the region of the stomach with tip at proximal end of the duodenum near the pylorus.      DX-CHEST-PORTABLE (1 VIEW)   Final Result      Stable examination.      DX-CHEST-PORTABLE (1 VIEW)   Final Result      Stable bilateral lower lobe atelectasis and/or consolidation.      DX-CHEST-PORTABLE (1 VIEW)   Final Result      Parahilar and bibasilar opacities are mildly improved on the right and increased on the left compared to prior.      Interstitial prominence likely represents interstitial edema.      Trace left pleural effusion is not excluded.         [id number] VIEW   Final Result      Enteric tube tip projects over the distal gastric body/antrum.      DX-CHEST-PORTABLE (1 VIEW)   Final Result      No significant change from prior exam.      ECHOCARDIOGRAM COMP W/O CONT   Final Result      DX-CHEST-PORTABLE (1 VIEW)   Final Result      Perihilar and bibasilar opacities likely represent atelectasis.      Endotracheal tube now projects at the level of the clavicular heads.         CT-CTA CHEST PULMONARY ARTERY W/ RECONS   Final Result      1.  No filling defects within the pulmonary arteries to indicate emboli.   2.  Prominent central pulmonary vessels suggesting pulmonary hypertension.   3.  RIGHT mainstem intubation with LEFT lower lobe atelectasis.      These findings were discussed with EUSEBIO SILVERIO on 8/8/2018 12:52 AM.          CT-CSPINE WITHOUT PLUS RECONS   Final Result      1.  Moderate to severe multilevel degenerative change of cervical spine.   2.  No acute fracture or subluxation.      CT-HEAD W/O   Final Result      1.  No acute intracranial abnormality.   2.  LEFT parietal scalp swelling.   3.  Chronic paranasal sinus disease.      DX-CHEST-PORTABLE (1 VIEW)   Final Result      1.  Supportive tubing as described above.   2.  Hypoinflation with elevated RIGHT hemidiaphragm.   3.  LEFT lung base atelectasis.          Problem and Plan:      * Severe sepsis (HCC)   Assessment & Plan    Severe sepsis secondary to pneumococcal pneumonia, which lead to AMS. Patient treated with 7days of rocephin, ID following. Patient extubated yesterday, improving.         Encephalopathy acute secondary to hypoxia from pneumonia   Assessment & Plan    Pneumococcal Pneumonia, completed 7 day course of Ceftriaxone. ID following. Patients mentation improving, more alert compared to yesterady        Streptococcal pneumonia ([facility])   Assessment & Plan    Patient treated with 7 days of Rocephin, ID on case        Acute hypoxemic respiratory failure ([facility])   Assessment & Plan    Patient was extubated 8/12/18. Tolerating well. Saturating at 96% on 2L, RR 17, not in visible respiratory distress.         Atrial fibrillation with RVR ([facility])   Assessment & Plan    New onset this admission in the context of sepsis, [id number]  2. Patient converted to NSR. Patient was trreated with Amiodarone, this was D/C  Plan  - On telemetry        Hyperglycemia   Assessment & Plan    Resolved. Patient has not received a dose of Insulin while in hospital.   Plan  - D/C ISS and accuchecks           Atelectasis   Assessment & Plan    Please refer to Acute hypoxemic respiratory failure A&P        Abnormal urinalysis   Assessment & Plan    U/A on 8/7/2018 : nitrite positive, small amount leucocyte esterase, WBC 5-10, no bacteria. Patient received 7 days of rocephin.         Elevated  troponin   Assessment & Plan    On admission trop 0.12.  EKG no st/t changes. No further trending, likely secondary to demand due to sepsis.         Rhabdomyolysis   Assessment & Plan    Trending down, last level 427.         Hypocalcemia   Assessment & Plan    Resolved. Adjusted calcium within normal range.         Hypomagnesemia   Assessment & Plan    Resolved. Maintain Mg at ~2.         Hypokalemia   Assessment & Plan    Resolved. Will maintin K ~4        Pulmonary hypertension (HCC)   Assessment & Plan    CTPA showed prominent pulmonary vessels suggesting pulmonary hypertension. TTE on 8/8/2018 : normal LV systolic function, EF 60%         Essential hypertension- (present on admission)   Assessment & Plan     this AM. Patients antihypertensive home medications, Hydralazone and Losartan held on admission because of sepsis and HAROON.   Plan  - Resumed patients home losartan and hydralazine, at reduced doses.            DISPO: Will continue to monitor mentation, consider transfer to floor later today or tomorrow.     CODE STATUS: Full Code    Quality Measures:  Raygoza Catheter: In place  DVT Prophylaxis: Enoxaparin  Ulcer Prophylaxis: None  Antibiotics: None, completed course of Rocehpin  Lines: PIV and CVC

## 2018-08-15 NOTE — THERAPY
"Speech Language Therapy dysphagia treatment completed.     Functional Status:  Patient was seen on this date for dysphagia treatment during breakfast with a regular/thin liquid meal tray. Patient consumed >90% of meal tray with no overt s/sx of aspiration. Vocal quality and breath sounds remained clear. She required moderate verbal cueing to reduce bite size and reduce feeding rate initially but had better use of strategies as session progressed. She was educated in regards to current status, risk of aspiration, and SLP recs. At this time, patient appears to be tolerating regular/thin liquid diet. Patient would benefit from speech therapy 1-2x likely to ensure diet tolerance.     Recommendations: continue regular/thin liquids     Plan of Care: Will benefit from Speech Therapy 3 times per week    Post-Acute Therapy: Currently anticipate no further skilled therapy needs once patient is discharged from the inpatient setting.    See \"Rehab Therapy-Acute\" Patient Summary Report for complete documentation.     "

## 2018-08-15 NOTE — PROGRESS NOTES
PT arrived to unit via wheelchair escorted by ICU RN VSS Pt is in sinus rhythm. PT A&O x 4. Monitor leads in place. Monitor room notified. PT is orientated to the unit, call light, phone system, fall precautions in place, appropriate signs in place, bed in low and locked positions, bed alarm on. Pt educated regarded fall precautions and importance of calling staff for assistance. Pt denies further needs at the time. Will continue to monitor.

## 2018-08-15 NOTE — PROGRESS NOTES
Monitor Summary:  NSR. HR 70-80s  .18/.08/.38     12hr chart check complete     2 RN Skin assessment: Right shoulder/back tear, dressing in place. Left upper arm skin tear, biotin dressing CDI. Large scab noted on R ankle L elbow abrasion, biotin dressing CDI

## 2018-08-16 ENCOUNTER — APPOINTMENT (OUTPATIENT)
Dept: RADIOLOGY | Facility: MEDICAL CENTER | Age: 58
DRG: 853 | End: 2018-08-16
Attending: INTERNAL MEDICINE

## 2018-08-16 ENCOUNTER — PATIENT OUTREACH (OUTPATIENT)
Dept: HEALTH INFORMATION MANAGEMENT | Facility: OTHER | Age: 58
End: 2018-08-16

## 2018-08-16 VITALS
WEIGHT: 293 LBS | SYSTOLIC BLOOD PRESSURE: 143 MMHG | BODY MASS INDEX: 41.02 KG/M2 | HEART RATE: 78 BPM | RESPIRATION RATE: 18 BRPM | OXYGEN SATURATION: 96 % | HEIGHT: 71 IN | DIASTOLIC BLOOD PRESSURE: 82 MMHG | TEMPERATURE: 98 F

## 2018-08-16 PROBLEM — J15.4 STREPTOCOCCAL PNEUMONIA (HCC): Status: RESOLVED | Noted: 2018-08-08 | Resolved: 2018-08-16

## 2018-08-16 PROBLEM — A41.9 SEVERE SEPSIS (HCC): Status: RESOLVED | Noted: 2018-08-08 | Resolved: 2018-08-16

## 2018-08-16 PROBLEM — E83.42 HYPOMAGNESEMIA: Status: RESOLVED | Noted: 2018-08-08 | Resolved: 2018-08-16

## 2018-08-16 PROBLEM — R82.90 ABNORMAL URINALYSIS: Status: RESOLVED | Noted: 2018-08-08 | Resolved: 2018-08-16

## 2018-08-16 PROBLEM — M62.82 RHABDOMYOLYSIS: Status: RESOLVED | Noted: 2018-08-08 | Resolved: 2018-08-16

## 2018-08-16 PROBLEM — E83.51 HYPOCALCEMIA: Status: RESOLVED | Noted: 2018-08-08 | Resolved: 2018-08-16

## 2018-08-16 PROBLEM — I48.91 ATRIAL FIBRILLATION WITH RVR (HCC): Status: RESOLVED | Noted: 2018-08-08 | Resolved: 2018-08-16

## 2018-08-16 PROBLEM — R73.9 HYPERGLYCEMIA: Status: RESOLVED | Noted: 2018-08-08 | Resolved: 2018-08-16

## 2018-08-16 PROBLEM — J98.11 ATELECTASIS: Status: RESOLVED | Noted: 2018-08-08 | Resolved: 2018-08-16

## 2018-08-16 PROBLEM — G93.40 ENCEPHALOPATHY ACUTE: Status: RESOLVED | Noted: 2018-08-08 | Resolved: 2018-08-16

## 2018-08-16 PROBLEM — R79.89 ELEVATED TROPONIN: Status: RESOLVED | Noted: 2018-08-08 | Resolved: 2018-08-16

## 2018-08-16 PROBLEM — R65.20 SEVERE SEPSIS (HCC): Status: RESOLVED | Noted: 2018-08-08 | Resolved: 2018-08-16

## 2018-08-16 PROBLEM — E87.6 HYPOKALEMIA: Status: RESOLVED | Noted: 2018-08-08 | Resolved: 2018-08-16

## 2018-08-16 PROBLEM — J96.01 ACUTE HYPOXEMIC RESPIRATORY FAILURE (HCC): Status: RESOLVED | Noted: 2018-08-08 | Resolved: 2018-08-16

## 2018-08-16 LAB
ANION GAP SERPL CALC-SCNC: 8 MMOL/L (ref 0–11.9)
BASOPHILS # BLD AUTO: 1.3 % (ref 0–1.8)
BASOPHILS # BLD: 0.11 K/UL (ref 0–0.12)
BUN SERPL-MCNC: 17 MG/DL (ref 8–22)
CALCIUM SERPL-MCNC: 8.8 MG/DL (ref 8.5–10.5)
CHLORIDE SERPL-SCNC: 104 MMOL/L (ref 96–112)
CO2 SERPL-SCNC: 29 MMOL/L (ref 20–33)
CREAT SERPL-MCNC: 0.64 MG/DL (ref 0.5–1.4)
EOSINOPHIL # BLD AUTO: 0.27 K/UL (ref 0–0.51)
EOSINOPHIL NFR BLD: 3.2 % (ref 0–6.9)
ERYTHROCYTE [DISTWIDTH] IN BLOOD BY AUTOMATED COUNT: 52.6 FL (ref 35.9–50)
GLUCOSE SERPL-MCNC: 89 MG/DL (ref 65–99)
HCT VFR BLD AUTO: 48.5 % (ref 37–47)
HGB BLD-MCNC: 15.3 G/DL (ref 12–16)
IMM GRANULOCYTES # BLD AUTO: 0.22 K/UL (ref 0–0.11)
IMM GRANULOCYTES NFR BLD AUTO: 2.6 % (ref 0–0.9)
LYMPHOCYTES # BLD AUTO: 2.78 K/UL (ref 1–4.8)
LYMPHOCYTES NFR BLD: 32.4 % (ref 22–41)
MAGNESIUM SERPL-MCNC: 2.1 MG/DL (ref 1.5–2.5)
MCH RBC QN AUTO: 29.7 PG (ref 27–33)
MCHC RBC AUTO-ENTMCNC: 31.5 G/DL (ref 33.6–35)
MCV RBC AUTO: 94 FL (ref 81.4–97.8)
MONOCYTES # BLD AUTO: 1.39 K/UL (ref 0–0.85)
MONOCYTES NFR BLD AUTO: 16.2 % (ref 0–13.4)
NEUTROPHILS # BLD AUTO: 3.8 K/UL (ref 2–7.15)
NEUTROPHILS NFR BLD: 44.3 % (ref 44–72)
NRBC # BLD AUTO: 0 K/UL
NRBC BLD-RTO: 0 /100 WBC
PHOSPHATE SERPL-MCNC: 3.9 MG/DL (ref 2.5–4.5)
PLATELET # BLD AUTO: 301 K/UL (ref 164–446)
PMV BLD AUTO: 9.8 FL (ref 9–12.9)
POTASSIUM SERPL-SCNC: 4.3 MMOL/L (ref 3.6–5.5)
RBC # BLD AUTO: 5.16 M/UL (ref 4.2–5.4)
SODIUM SERPL-SCNC: 141 MMOL/L (ref 135–145)
WBC # BLD AUTO: 8.6 K/UL (ref 4.8–10.8)

## 2018-08-16 PROCEDURE — 90471 IMMUNIZATION ADMIN: CPT

## 2018-08-16 PROCEDURE — 700111 HCHG RX REV CODE 636 W/ 250 OVERRIDE (IP): Performed by: HOSPITALIST

## 2018-08-16 PROCEDURE — 90732 PPSV23 VACC 2 YRS+ SUBQ/IM: CPT | Performed by: HOSPITALIST

## 2018-08-16 PROCEDURE — 700102 HCHG RX REV CODE 250 W/ 637 OVERRIDE(OP): Performed by: INTERNAL MEDICINE

## 2018-08-16 PROCEDURE — 3E0234Z INTRODUCTION OF SERUM, TOXOID AND VACCINE INTO MUSCLE, PERCUTANEOUS APPROACH: ICD-10-PCS | Performed by: HOSPITALIST

## 2018-08-16 PROCEDURE — 83735 ASSAY OF MAGNESIUM: CPT

## 2018-08-16 PROCEDURE — 700111 HCHG RX REV CODE 636 W/ 250 OVERRIDE (IP): Performed by: INTERNAL MEDICINE

## 2018-08-16 PROCEDURE — 84100 ASSAY OF PHOSPHORUS: CPT

## 2018-08-16 PROCEDURE — A9270 NON-COVERED ITEM OR SERVICE: HCPCS | Performed by: INTERNAL MEDICINE

## 2018-08-16 PROCEDURE — 85025 COMPLETE CBC W/AUTO DIFF WBC: CPT

## 2018-08-16 PROCEDURE — 80048 BASIC METABOLIC PNL TOTAL CA: CPT

## 2018-08-16 PROCEDURE — 99239 HOSP IP/OBS DSCHRG MGMT >30: CPT | Mod: GC | Performed by: INTERNAL MEDICINE

## 2018-08-16 PROCEDURE — 36415 COLL VENOUS BLD VENIPUNCTURE: CPT

## 2018-08-16 RX ADMIN — LOSARTAN POTASSIUM 50 MG: 25 TABLET, FILM COATED ORAL at 05:11

## 2018-08-16 RX ADMIN — HYDRALAZINE HYDROCHLORIDE 25 MG: 25 TABLET, FILM COATED ORAL at 05:11

## 2018-08-16 RX ADMIN — PNEUMOCOCCAL VACCINE POLYVALENT 25 MCG
25; 25; 25; 25; 25; 25; 25; 25; 25; 25; 25; 25; 25; 25; 25; 25; 25; 25; 25; 25; 25; 25; 25 INJECTION, SOLUTION INTRAMUSCULAR; SUBCUTANEOUS at 11:05

## 2018-08-16 RX ADMIN — MAGNESIUM GLUCONATE 500 MG ORAL TABLET 400 MG: 500 TABLET ORAL at 05:11

## 2018-08-16 RX ADMIN — CARVEDILOL 3.12 MG: 3.12 TABLET, FILM COATED ORAL at 11:04

## 2018-08-16 RX ADMIN — ENOXAPARIN SODIUM 60 MG: 100 INJECTION SUBCUTANEOUS at 05:11

## 2018-08-16 ASSESSMENT — LIFESTYLE VARIABLES
TOTAL SCORE: 0
HAVE YOU EVER FELT YOU SHOULD CUT DOWN ON YOUR DRINKING: NO
TOTAL SCORE: 0
HAVE PEOPLE ANNOYED YOU BY CRITICIZING YOUR DRINKING: NO
EVER FELT BAD OR GUILTY ABOUT YOUR DRINKING: NO
ALCOHOL_USE: YES
HOW MANY TIMES IN THE PAST YEAR HAVE YOU HAD 5 OR MORE DRINKS IN A DAY: 0
TOTAL SCORE: 0
CONSUMPTION TOTAL: NEGATIVE
ON A TYPICAL DAY WHEN YOU DRINK ALCOHOL HOW MANY DRINKS DO YOU HAVE: 0
EVER HAD A DRINK FIRST THING IN THE MORNING TO STEADY YOUR NERVES TO GET RID OF A HANGOVER: NO
AVERAGE NUMBER OF DAYS PER WEEK YOU HAVE A DRINK CONTAINING ALCOHOL: 0

## 2018-08-16 ASSESSMENT — ENCOUNTER SYMPTOMS
CHILLS: 0
NAUSEA: 0
BLURRED VISION: 0
DOUBLE VISION: 0
SHORTNESS OF BREATH: 0
VOMITING: 0
FEVER: 0
COUGH: 0

## 2018-08-16 ASSESSMENT — PAIN SCALES - GENERAL
PAINLEVEL_OUTOF10: 0
PAINLEVEL_OUTOF10: 0

## 2018-08-16 NOTE — PROGRESS NOTES
Assumed care of PT A&O x 4. Pt resting in bed with no signs of labored breathing. On 2L n.c  Tele monitor in place, cardiac rhythm being monitored. Call light within reach, bed in lowest position, upper bed rails up, bed alarm on. Pt was updated on plan of care for the day . Will continue to monitor.

## 2018-08-16 NOTE — PROGRESS NOTES
Team skin Assessment   Grey Foam applied to oxygen tubing. Rn notified.   New picture uploaded of L elbow

## 2018-08-16 NOTE — PROGRESS NOTES
Bedside shift report received. Assumed care of patient at this time. Patient sitting up in bed resting comfortably. Family at bedside.  Personal items and call light within reach. No further request per patient at this time.

## 2018-08-16 NOTE — CARE PLAN
Problem: Safety  Goal: Will remain free from injury    Intervention: Provide assistance with mobility  Fall risk assessed, fall precautions in place, bed alarm set, pt verbalizes understanding of fall risk.      Problem: Skin Integrity  Goal: Risk for impaired skin integrity will decrease  Skin assessment complete, skin intact, pt encouraged to turn self and ambulate as tolerated.

## 2018-08-16 NOTE — DISCHARGE INSTRUCTIONS
Discharge Instructions    Discharged to home by car with relative. Discharged via wheelchair, hospital escort: Yes.  Special equipment needed: Not Applicable    Be sure to schedule a follow-up appointment with your primary care doctor or any specialists as instructed.     Discharge Plan:   Diet Plan: Discussed  Activity Level: Discussed  Confirmed Follow up Appointment: Appointment Scheduled  Confirmed Symptoms Management: Discussed  Medication Reconciliation Updated: Yes  Pneumococcal Vaccine Administered/Refused: Given (See MAR)  Influenza Vaccine Indication: Indicated: Not available from distributor/    I understand that a diet low in cholesterol, fat, and sodium is recommended for good health. Unless I have been given specific instructions below for another diet, I accept this instruction as my diet prescription.       Other diet: Cardiac, Heart Healthy    Special Instructions: None    · Is patient discharged on Warfarin / Coumadin?   No     Depression / Suicide Risk    As you are discharged from this [facility] facility, it is important to learn how to keep safe from harming yourself.    Recognize the warning signs:  · Abrupt changes in personality, positive or negative- including increase in energy   · Giving away possessions  · Change in eating patterns- significant weight changes-  positive or negative  · Change in sleeping patterns- unable to sleep or sleeping all the time   · Unwillingness or inability to communicate  · Depression  · Unusual sadness, discouragement and loneliness  · Talk of wanting to die  · Neglect of personal appearance   · Rebelliousness- reckless behavior  · Withdrawal from people/activities they love  · Confusion- inability to concentrate     If you or a loved one observes any of these behaviors or has concerns about self-harm, here's what you can do:  · Talk about it- your feelings and reasons for harming yourself  · Remove any means that you might use to hurt yourself  (examples: pills, rope, extension cords, firearm)  · Get professional help from the community (Mental Health, Substance Abuse, psychological counseling)  · Do not be alone:Call your Safe Contact- someone whom you trust who will be there for you.  · Call your local CRISIS HOTLINE 313-2571 or 844-365-1254  · Call your local Children's Mobile Crisis Response Team [place] (721) 586-1829 or www.[employer]  · Call the toll free National Suicide Prevention Hotlines   · National Suicide Prevention Lifeline 478-534-VIRN (9242)  · National Hope Line Network 800-SUICIDE (435-7742)          Sepsis, Adult  Sepsis is a serious infection of your blood or tissues that affects your whole body. The infection that causes sepsis may be bacterial, viral, fungal, or parasitic. Sepsis may be life threatening. Sepsis can cause your blood pressure to drop. This may result in shock. Shock causes your central nervous system and your organs to stop working correctly.  What increases the risk?  Sepsis can happen in anyone, but it is more likely to happen in people who have weakened immune systems.  What are the signs or symptoms?  Symptoms of sepsis can include:  · Fever or low body temperature (hypothermia).  · Rapid breathing (hyperventilation).  · Chills.  · Rapid heartbeat (tachycardia).  · Confusion or light-headedness.  · Trouble breathing.  · Urinating much less than usual.  · Cool, clammy skin or red, flushed skin.  · Other problems with the heart, kidneys, or brain.  How is this diagnosed?  Your health care provider will likely do tests to look for an infection, to see if the infection has spread to your blood, and to see how serious your condition is. Tests can include:  · Blood tests, including cultures of your blood.  · Cultures of other fluids from your body, such as:  ¨ Urine.  ¨ Pus from wounds.  ¨ Mucus coughed up from your lungs.  · Urine tests other than cultures.  · X-ray exams or other imaging tests.  How is this  treated?  Treatment will begin with elimination of the source of infection. If your sepsis is likely caused by a bacterial or fungal infection, you will be given antibiotic or antifungal medicines.  You may also receive:  · Oxygen.  · Fluids through an IV tube.  · Medicines to increase your blood pressure.  · A machine to clean your blood (dialysis) if your kidneys fail.  · A machine to help you breathe if your lungs fail.  Get help right away if:  You get an infection or develop any of the signs and symptoms of sepsis after surgery or a hospitalization.  This information is not intended to replace advice given to you by your health care provider. Make sure you discuss any questions you have with your health care provider.  Document Released: 09/15/2004 Document Revised: 05/25/2017 Document Reviewed: 08/25/2014  [employer] Interactive Patient Education © 2017 [employer]          Rhabdomyolysis  Rhabdomyolysis is a condition that happens when muscle cells break down and release substances into the blood that can damage the kidneys. This condition happens because of damage to the muscles that move bones (skeletal muscle). When the skeletal muscles are damaged, substances inside the muscle cells go into the blood. One of these substances is a protein called myoglobin.  Large amounts of myoglobin can cause kidney damage or kidney failure. Other substances that are released by muscle cells may upset the balance of the minerals (electrolytes) in your blood. This imbalance causes your blood to have too much acid (acidosis).  What are the causes?  This condition is caused by muscle damage. Muscle damage often happens because of:  · Using your muscles too much.  · An injury that crushes or squeezes a muscle too tightly.  · Using illegal drugs, mainly cocaine.  · Alcohol abuse.  Other possible causes include:  · Prescription medicines, such as those that:  ¨ Lower cholesterol (statins).  ¨ Treat ADHD (attention deficit  hyperactivity disorder) or help with weight loss (amphetamines).  ¨ Treat pain (opiates).  · Infections.  · Muscle diseases that are passed down from parent to child (inherited).  · High fever.  · Heatstroke.  · Not having enough fluids in your body (dehydration).  · Seizures.  · Surgery.  What increases the risk?  This condition is more likely to develop in people who:  · Have a family history of muscle disease.  · Take part in extreme sports, such as running in marathons.  · Have diabetes.  · Are older.  · Abuse drugs or alcohol.  What are the signs or symptoms?  Symptoms of this condition vary. Some people have very few symptoms, and other people have many symptoms. The most common symptoms include:  · Muscle pain and swelling.  · Weak muscles.  · Dark urine.  · Feeling weak and tired.  Other symptoms include:  · Nausea and vomiting.  · Fever.  · Pain in the abdomen.  · Pain in the joints.  Symptoms of complications from this condition include:  · Heart rhythm that is not normal (arrhythmia).  · Seizures.  · Not urinating enough because of kidney failure.  · Very low blood pressure (shock). Signs of shock include dizziness, blurry vision, and clammy skin.  · Bleeding that is hard to stop or control.  How is this diagnosed?  This condition is diagnosed based on your medical history, your symptoms, and a physical exam. Tests may also be done, including:  · Blood tests.  · Urine tests to check for myoglobin.  You may also have other tests to check for causes of muscle damage and to check for complications.  How is this treated?  Treatment for this condition helps to:  · Make sure you have enough fluids in your body.  · Lower the acid levels in your blood to reverse acidosis.  · Protect your kidneys.  Treatment may include:  · Fluids and medicines given through an IV tube that is inserted into one of your veins.  · Medicines to lower acidosis or to bring back the balance of the minerals in your body.  · Hemodialysis.  This treatment uses an artificial kidney machine to filter your blood while you recover. You may have this if other treatments are not helping.  Follow these instructions at home:  · Take over-the-counter and prescription medicines only as told by your health care provider.  · Rest at home until your health care provider says that you can return to your normal activities.  · Drink enough fluid to keep your urine clear or pale yellow.  · Do not do activities that take a lot of effort (are strenuous). Ask your health care provider what level of exercise is safe for you.  · Do not abuse drugs or alcohol. If you are having problems with drug or alcohol use, ask your health care provider for help.  · Keep all follow-up visits as told by your health care provider. This is important.  Contact a health care provider if:  · You start having symptoms of this condition after treatment.  Get help right away if:  · You have a seizure.  · You bleed easily or cannot control bleeding.  · You cannot urinate.  · You have chest pain.  · You have trouble breathing.  This information is not intended to replace advice given to you by your health care provider. Make sure you discuss any questions you have with your health care provider.  Document Released: 11/30/2005 Document Revised: 09/29/2017 Document Reviewed: 09/29/2017  [employer] Interactive Patient Education © 2017 [employer]

## 2018-08-16 NOTE — PROGRESS NOTES
Discharge instructions give to patient at bedside. Pt verbalizes understanding and states plans for follow up. New and home medications reviewed, post discharge activity level and worsening of symptoms needing follow up care discussed. Telemetry monitor and IV cathlon removed. All belongings accounted for, all questions answered at this time. Pt walked with daughter to car.

## 2018-08-17 ENCOUNTER — PATIENT OUTREACH (OUTPATIENT)
Dept: HEALTH INFORMATION MANAGEMENT | Facility: OTHER | Age: 58
End: 2018-08-17

## 2018-08-17 NOTE — DISCHARGE SUMMARY
Internal Medicine Discharge Summary  Note Author: Aga Singh M.D.       Name Salome Quinones 1960   Age/Sex 58 y.o. female   MRN [id number]         Admit Date:  2018       Discharge Date:   2018    Service:   [facility] Internal Medicine Green Team  Attending Physician(s):  Dr. Fernandez  Senior Resident(s):   Dr. Singh  Ozzy Resident(s):   Dr. Correia  PCP: Colby Goodman P.A.-C.      Primary Diagnosis:   Severe sepsis    Secondary Diagnoses:                Principal Problem (Resolved):    Severe sepsis (HCC) POA: Unknown  Active Problems:    Hyperglycemia POA: Unknown    Essential hypertension POA: Yes    Pulmonary hypertension (HCC) POA: Unknown  Resolved Problems:    Acute hypoxemic respiratory failure (HCC) POA: Unknown    Streptococcal pneumonia (HCC) POA: Unknown    Encephalopathy acute secondary to hypoxia from pneumonia POA: Unknown    Hypocalcemia POA: Unknown    Rhabdomyolysis POA: Unknown    Elevated troponin POA: Unknown    Abnormal urinalysis POA: Unknown    Atelectasis POA: Unknown    Atrial fibrillation with RVR (HCC) POA: Unknown    Hypokalemia POA: Unknown    Hypomagnesemia POA: Unknown      Hospital Summary (Brief Narrative):       58-year-old female with past medical history of hypertension who had a 2 day history of lethargy and was foundto be unresponsive by her  with a temperature of 104 on 2018.  Subsequently EMS was called and she was intubated on the scene, transferred to the hospital and admitted to the ICU.     As per ICU transfer summary by Dr. Muñiz (8/15/2018)  Hospital Course: Patient was admitted to the ICU where a lumbar puncture, central venous catheter, and bronchoscopy were done.  Bronchoscopy revealed moderate amount of yellow secretions bilaterally and a BAL was carried out on the basilar segments of the left lower lung.  Culture from this revealed gram-positive cocci.  However, due to slightly elevated protein levels patient was  started on ampicillin, Rocephin, vancomycin, and acyclovir for empiric meningitis coverage.  Infectious disease was consulted and they recommended continuing Rocephin at CAP dosing, and continuing acyclovir until HSV PCR testing could be done.  There was concern for pneumococcal pneumonia, left lower lobe consolidation CT scan, and apparent meningitis. They recommended discontinuing vancomycin and ampicillin.  They also mention concern for West Nile virus.  Eventually HSV PCR was negative and patient was discontinued from acyclovir, but patient was continued on Rocephin for a total of 7 days. Patient was extubated on 08/12/2018. Patients altered mentation continued to improve, her BP increased and patient was restarted on home meds for HTN. PT/OT were ordered, along with a swallow evaluation. Patient was deemed medically stable enough to be transferred to the floor and was transferred on 8/15/2018.    Patient did well on the floor overnight, she was able to ambulate without assistance and her oxygenation was within normal limits.  Of note, is that patient on admission did have atrial fibrillation with RVR in the setting of sepsis, CHADVASC 2.  We discussed anticoagulation with her and wanted to send her home on Xarelto however patient did not have insurance.  We discussed the possibility of using Coumadin and attempted to set up an appointment for anticoagulation clinic however given patient's hardship financially it would cost around $600 which was not amenable to the patient.  We do recommend following up with PCP for consideration of anticoagulation initiation for the next 3 month.     Summary of follow up issues:   -Anticoagulation discussion as per above for atrial fibrillation in the setting of sepsis      Consultants:     Infectious disease  Pulmonary    Procedures:        Intubation on 8/8/2018  Lumbar puncture on 8/8/2028  Bronchoscopy on 8/8/2018  Central venous catheter placement on 8/8/2028    Imaging/  Testing:      DX-ABDOMEN FOR TUBE PLACEMENT   Final Result      Feeding tube placement with the tip projecting over the distal stomach or proximal duodenum      DX-CHEST-PORTABLE (1 VIEW)   Final Result      1.  Status post extubation.   2.  Tubes and lines are in stable position.   3.  Left basilar atelectasis.   4.  Elevated right dome of elevated right dome of diaphragm.      DX-ABDOMEN FOR TUBE PLACEMENT   Final Result      The tip of the Cortrak tube projects over the antrum of the stomach      DX-ABDOMEN FOR TUBE PLACEMENT   Final Result      Feeding tube appears to extend through the region of the stomach with tip at proximal end of the duodenum near the pylorus.      DX-CHEST-PORTABLE (1 VIEW)   Final Result      Stable examination.      DX-CHEST-PORTABLE (1 VIEW)   Final Result      Stable bilateral lower lobe atelectasis and/or consolidation.      DX-CHEST-PORTABLE (1 VIEW)   Final Result      Parahilar and bibasilar opacities are mildly improved on the right and increased on the left compared to prior.      Interstitial prominence likely represents interstitial edema.      Trace left pleural effusion is not excluded.         [id number] VIEW   Final Result      Enteric tube tip projects over the distal gastric body/antrum.      DX-CHEST-PORTABLE (1 VIEW)   Final Result      No significant change from prior exam.      ECHOCARDIOGRAM COMP W/O CONT   Final Result      DX-CHEST-PORTABLE (1 VIEW)   Final Result      Perihilar and bibasilar opacities likely represent atelectasis.      Endotracheal tube now projects at the level of the clavicular heads.         CT-CTA CHEST PULMONARY ARTERY W/ RECONS   Final Result      1.  No filling defects within the pulmonary arteries to indicate emboli.   2.  Prominent central pulmonary vessels suggesting pulmonary hypertension.   3.  RIGHT mainstem intubation with LEFT lower lobe atelectasis.      These findings were discussed with ESUEBIO SILVERIO on 8/8/2018 12:52 AM.          CT-CSPINE WITHOUT PLUS RECONS   Final Result      1.  Moderate to severe multilevel degenerative change of cervical spine.   2.  No acute fracture or subluxation.      CT-HEAD W/O   Final Result      1.  No acute intracranial abnormality.   2.  LEFT parietal scalp swelling.   3.  Chronic paranasal sinus disease.      DX-CHEST-PORTABLE (1 VIEW)   Final Result      1.  Supportive tubing as described above.   2.  Hypoinflation with elevated RIGHT hemidiaphragm.   3.  LEFT lung base atelectasis.            Discharge Medications:         Medication Reconciliation: Completed       Medication List      CONTINUE taking these medications      Instructions   carvedilol 6.25 MG Tabs  Commonly known as:  COREG   Take 1 Tab by mouth 2 times a day, with meals.  Dose:  6.25 mg     hydrALAZINE 25 MG Tabs  Commonly known as:  APRESOLINE   Take 1 tablet 3 times daily     losartan 50 MG Tabs  Commonly known as:  COZAAR   Take 1 Tab by mouth 2 Times a Day.  Dose:  50 mg                Disposition:   Home    Diet:   Regular    Activity:   As tolerated    Instructions:        The patient was instructed to return to the ER in the event of worsening symptoms. I have counseled the patient on the importance of compliance and the patient has agreed to proceed with all medical recommendations and follow up plan indicated above.   The patient understands that all medications come with benefits and risks. Risks may include permanent injury or death and these risks can be minimized with close reassessment and monitoring.        Primary Care Provider:    Colby Goodman P.A.-C.    Discharge summary faxed to primary care provider:  Completed  Copy of discharge summary given to the patient: Deferred      Follow up appointment details :      -Please follow-up with primary care provider      Pending Studies:        None    Time spent on discharge day patient visit, preparing discharge paperwork and arranging for patient follow  up.        Discharge Time (Minutes) :    35 minutes  Hospital Course Type:  Inpatient Stay >2 midnights      Condition on Discharge    Stable  ______________________________________________________________________    Interval history/exam for day of discharge:    Patient did extremely well overnight, was anxious to leave.  Was walking around the hallways without desaturating and without assist.      Most Recent Labs:    Lab Results   Component Value Date/Time    WBC 8.6 08/16/2018 03:12 AM    RBC 5.16 08/16/2018 03:12 AM    HEMOGLOBIN 15.3 08/16/2018 03:12 AM    HEMATOCRIT 48.5 (H) 08/16/2018 03:12 AM    MCV 94.0 08/16/2018 03:12 AM    MCH 29.7 08/16/2018 03:12 AM    MCHC 31.5 (L) 08/16/2018 03:12 AM    MPV 9.8 08/16/2018 03:12 AM    NEUTSPOLYS 44.30 08/16/2018 03:12 AM    LYMPHOCYTES 32.40 08/16/2018 03:12 AM    MONOCYTES 16.20 (H) 08/16/2018 03:12 AM    EOSINOPHILS 3.20 08/16/2018 03:12 AM    BASOPHILS 1.30 08/16/2018 03:12 AM      Lab Results   Component Value Date/Time    SODIUM 141 08/16/2018 03:12 AM    POTASSIUM 4.3 08/16/2018 03:12 AM    CHLORIDE 104 08/16/2018 03:12 AM    CO2 29 08/16/2018 03:12 AM    GLUCOSE 89 08/16/2018 03:12 AM    BUN 17 08/16/2018 03:12 AM    CREATININE 0.64 08/16/2018 03:12 AM      Lab Results   Component Value Date/Time    ALTSGPT 108 (H) 08/13/2018 04:36 AM    ASTSGOT 85 (H) 08/13/2018 04:36 AM    ALKPHOSPHAT 65 08/13/2018 04:36 AM    TBILIRUBIN 0.5 08/13/2018 04:36 AM    ALBUMIN 3.3 08/13/2018 04:36 AM    GLOBULIN 2.7 08/13/2018 04:36 AM    PREALBUMIN 21.0 08/13/2018 04:36 AM     No results found for: PROTHROMBTM, INR     [facility] ATTENDING NOTE:      Date & Time note created:   8/16/2018   9:07 PM     Visit Time:   Attending/resident bedside rounds 9-11:30 AM    The patient was personally evaluated with the resident staff on the day of discharge. I reviewed the resident's discharge summary and agree with the resident's findings and plan and overall spent-time as  documented in the resident's note, except or in addition, as documented below.    The chart was reviewed and summarized.  Available labs, imaging, O2 sats ,  EKGs were reviewed. Available nursing, consultant, and resident notes were reviewed. I am actively involved in the patient's care.      Probably at high risk for A fib due to documented episode during this hospitalization and noncompliance with CPAP. Tried to start her on anticoagulation, which pt agreed to but could not set it up due to cost issues and pt unwilling to stay in the hospital to sort things out. Currently remains in NSR. Would recommend PCP to reconsider anticoagulation, at least for 3 mnths if remains in NSR.    Discharge time: 35 mins.    Rosalio Fernandez MD  [employer]

## 2018-08-17 NOTE — PROGRESS NOTES
Internal Medicine Interval Note  Note Author: Rashi Correia M.D.     Name Salome Quinones 1960   Age/Sex 58 y.o. female   MRN [id number]   Code Status Full     After 5PM or if no immediate response to page, please call for cross-coverage  Attending/Team: Dr. Fernandez See Patient List for primary contact information  Call [phone] to page    1st Call - Day Intern (R1):   Dr. Correia 2nd Call - Day Sr. Resident (R2/R3):   Dr. Singh         Reason for interval visit  (Principal Problem)   Atrial fibrillation, f/u PNA      Interval Problem Daily Status Update  (24 hours, problem oriented, brief subjective history, new lab/imaging data pertinent to that problem)   24H: Patient feeling very well overnight, ambulating, stooling normally, very eager to go home. Patient ambulated with pulse oximetry without any desatting and weaned fully off oxygen.    Pneumonia/AMS - Stable, resolved patient no longer having symptoms. WBC now normal, vital signs normal, patient alert and oriented x4.    Atrial fibrillation - Patient in sinus rhythm, denies any palpitations, chest pain, SOB.     HTN - stable  Review of Systems   Constitutional: Negative for chills and fever.   Eyes: Negative for blurred vision and double vision.   Respiratory: Negative for cough and shortness of breath.    Cardiovascular: Negative for chest pain and leg swelling.   Gastrointestinal: Negative for nausea and vomiting.       Disposition/Barriers to discharge:   None, will discharge home today    PCP: Colby Goodman P.A.-C.      Quality Measures  Quality-Core Measures   Reviewed items::  Labs reviewed, Medications reviewed and EKG reviewed  Raygoza catheter::  No Raygoza  DVT prophylaxis pharmacological::  Enoxaparin (Lovenox)          Physical Exam       Vitals:    18 0000 18 0400 18 0809 18 1104   BP: (!) 162/77 119/75 143/82    Pulse: 80 83 77 78   Resp:     Temp: 36.5 °C (97.7 °F) 36.5 °C (97.7 °F)  36.7 °C (98 °F)    SpO2: 94% 91% 96%    Weight:       Height:         Body mass index is 42.09 kg/m².   Oxygen Therapy:  Pulse Oximetry: 96 %, O2 (LPM): 0, O2 Delivery: None (Room Air)    Physical Exam   Constitutional: She is oriented to person, place, and time and well-developed, well-nourished, and in no distress.   Cardiovascular: Normal rate and regular rhythm.  Exam reveals no gallop and no friction rub.    No murmur heard.  Pulmonary/Chest: Effort normal and breath sounds normal. She has no wheezes. She has no rales.   Abdominal: Soft. Bowel sounds are normal. She exhibits no distension. There is no tenderness.   Musculoskeletal: She exhibits no edema.   Neurological: She is alert and oriented to person, place, and time. Gait normal.             Assessment/Plan     * Severe sepsis (HCC)   Assessment & Plan    Severe sepsis secondary to pneumococcal pneumonia, which lead to AMS. Patient treated with 7days of rocephin. Oxygen weaned off patient successfully and sepsis resolved.        Atrial fibrillation with RVR (HCC)   Assessment & Plan    New onset this admission in the context of sepsis, [id number]  2. Patient converted to NSR. Patient was trreated with Amiodarone, this was D/C  Given her [id number] score, Xarelto anticoagulation was discussed with patient however she has no insurance and was unable to afford this. Coumadin with anticoagulation clinic follow up was also discussed, but this was too expensive.  Plan  Discharged patient with instructions to follow up with PCP regarding 3 months of anticoagulation for her atrial fibrillation        Pulmonary hypertension (HCC)   Assessment & Plan    CTPA showed prominent pulmonary vessels suggesting pulmonary hypertension. TTE on 8/8/2018 : normal LV systolic function, EF 60%         Essential hypertension- (present on admission)   Assessment & Plan    Assessment: Patient stable on home medications    Plan:  Discharge on home coreg, losartan, hydralazine

## 2018-08-21 LAB
ACTION RANGE TRIGGERED IACRT: NO
BASE EXCESS BLDA CALC-SCNC: 3 MMOL/L (ref -4–3)
BODY TEMPERATURE: ABNORMAL DEGREES
CO2 BLDA-SCNC: 29 MMOL/L (ref 20–33)
HCO3 BLDA-SCNC: 27.7 MMOL/L (ref 17–25)
INST. QUALIFIED PATIENT IIQPT: YES
O2/TOTAL GAS SETTING VFR VENT: 40 %
PCO2 BLDA: 40.1 MMHG (ref 26–37)
PCO2 TEMP ADJ BLDA: 40.8 MMHG (ref 26–37)
PH BLDA: 7.45 [PH] (ref 7.4–7.5)
PH TEMP ADJ BLDA: 7.44 [PH] (ref 7.4–7.5)
PO2 BLDA: 89 MMHG (ref 64–87)
PO2 TEMP ADJ BLDA: 92 MMHG (ref 64–87)
SAO2 % BLDA: 97 % (ref 93–99)
SPECIMEN DRAWN FROM PATIENT: ABNORMAL

## 2018-08-23 PROBLEM — I48.91 ATRIAL FIBRILLATION WITH RVR (HCC): Status: RESOLVED | Noted: 2018-08-08 | Resolved: 2018-08-23

## 2018-08-23 PROBLEM — R65.20 SEVERE SEPSIS (HCC): Status: RESOLVED | Noted: 2018-08-08 | Resolved: 2018-08-23

## 2018-08-23 PROBLEM — A41.9 SEVERE SEPSIS (HCC): Status: RESOLVED | Noted: 2018-08-08 | Resolved: 2018-08-23

## 2018-09-05 LAB
FUNGUS SPEC CULT: NORMAL
SIGNIFICANT IND 70042: NORMAL
SITE SITE: NORMAL
SOURCE SOURCE: NORMAL

## 2018-10-03 LAB
MYCOBACTERIUM SPEC CULT: NORMAL
RHODAMINE-AURAMINE STN SPEC: NORMAL
SIGNIFICANT IND 70042: NORMAL
SITE SITE: NORMAL
SOURCE SOURCE: NORMAL

## 2022-07-18 PROBLEM — Z76.89 ENCOUNTER TO ESTABLISH CARE WITH NEW DOCTOR: Status: ACTIVE | Noted: 2022-07-18

## 2022-09-29 PROBLEM — I69.30 HISTORY OF CVA WITH RESIDUAL DEFICIT: Status: ACTIVE | Noted: 2022-09-29

## 2022-10-07 PROBLEM — Z01.810 PREOPERATIVE CARDIOVASCULAR EXAMINATION: Status: ACTIVE | Noted: 2022-07-18

## 2023-02-09 PROBLEM — M79.2 NEUROGENIC PAIN DUE TO CENTRAL NERVOUS SYSTEM ABNORMALITY FOLLOWING STROKE: Status: ACTIVE | Noted: 2023-02-09

## 2023-02-09 PROBLEM — I69.398 NEUROGENIC PAIN DUE TO CENTRAL NERVOUS SYSTEM ABNORMALITY FOLLOWING STROKE: Status: ACTIVE | Noted: 2023-02-09
